# Patient Record
Sex: FEMALE | Race: WHITE | NOT HISPANIC OR LATINO | Employment: FULL TIME | URBAN - METROPOLITAN AREA
[De-identification: names, ages, dates, MRNs, and addresses within clinical notes are randomized per-mention and may not be internally consistent; named-entity substitution may affect disease eponyms.]

---

## 2018-04-18 ENCOUNTER — OFFICE VISIT CONVERTED (OUTPATIENT)
Dept: SURGERY | Facility: CLINIC | Age: 58
End: 2018-04-18
Attending: NURSE PRACTITIONER

## 2018-05-21 ENCOUNTER — CONVERSION ENCOUNTER (OUTPATIENT)
Dept: MAMMOGRAPHY | Facility: HOSPITAL | Age: 58
End: 2018-05-21

## 2019-05-23 ENCOUNTER — HOSPITAL ENCOUNTER (OUTPATIENT)
Dept: MAMMOGRAPHY | Facility: HOSPITAL | Age: 59
Discharge: HOME | End: 2019-05-23
Attending: NURSE PRACTITIONER

## 2020-05-20 ENCOUNTER — HOSPITAL ENCOUNTER (OUTPATIENT)
Dept: MRI IMAGING | Facility: HOSPITAL | Age: 60
Discharge: HOME | End: 2020-05-20
Attending: PHYSICIAN ASSISTANT

## 2021-05-16 VITALS — BODY MASS INDEX: 35.33 KG/M2 | HEIGHT: 62 IN | RESPIRATION RATE: 16 BRPM | WEIGHT: 192 LBS

## 2021-05-22 ENCOUNTER — TRANSCRIBE ORDERS (OUTPATIENT)
Dept: ADMINISTRATIVE | Facility: HOSPITAL | Age: 61
End: 2021-05-22

## 2021-05-22 DIAGNOSIS — Z12.31 VISIT FOR SCREENING MAMMOGRAM: Primary | ICD-10-CM

## 2021-06-16 ENCOUNTER — OFFICE VISIT (OUTPATIENT)
Dept: GASTROENTEROLOGY | Facility: CLINIC | Age: 61
End: 2021-06-16

## 2021-06-16 ENCOUNTER — PREP FOR SURGERY (OUTPATIENT)
Dept: OTHER | Facility: HOSPITAL | Age: 61
End: 2021-06-16

## 2021-06-16 VITALS
WEIGHT: 207 LBS | HEART RATE: 66 BPM | DIASTOLIC BLOOD PRESSURE: 104 MMHG | HEIGHT: 63 IN | SYSTOLIC BLOOD PRESSURE: 122 MMHG | BODY MASS INDEX: 36.68 KG/M2 | TEMPERATURE: 98.8 F

## 2021-06-16 DIAGNOSIS — K21.9 GASTROESOPHAGEAL REFLUX DISEASE, UNSPECIFIED WHETHER ESOPHAGITIS PRESENT: Primary | ICD-10-CM

## 2021-06-16 DIAGNOSIS — R10.32 LEFT LOWER QUADRANT ABDOMINAL PAIN: Primary | ICD-10-CM

## 2021-06-16 DIAGNOSIS — R12 HEARTBURN: ICD-10-CM

## 2021-06-16 PROBLEM — F39 MOOD DISORDER (HCC): Status: ACTIVE | Noted: 2021-06-16

## 2021-06-16 PROBLEM — M25.511 PAIN AND SWELLING OF RIGHT SHOULDER: Status: ACTIVE | Noted: 2020-05-08

## 2021-06-16 PROBLEM — R53.83 FATIGUE: Status: ACTIVE | Noted: 2020-06-15

## 2021-06-16 PROBLEM — E78.5 HYPERLIPIDEMIA: Status: ACTIVE | Noted: 2021-06-16

## 2021-06-16 PROBLEM — Z98.890 S/P ROTATOR CUFF REPAIR: Status: ACTIVE | Noted: 2020-09-11

## 2021-06-16 PROBLEM — M25.411 PAIN AND SWELLING OF RIGHT SHOULDER: Status: ACTIVE | Noted: 2020-05-08

## 2021-06-16 PROBLEM — M25.811 SHOULDER IMPINGEMENT, RIGHT: Status: ACTIVE | Noted: 2020-06-29

## 2021-06-16 PROBLEM — M75.41 SHOULDER IMPINGEMENT, RIGHT: Status: ACTIVE | Noted: 2020-06-29

## 2021-06-16 PROBLEM — I10 HIGH BLOOD PRESSURE: Status: ACTIVE | Noted: 2021-06-16

## 2021-06-16 PROBLEM — I10 HYPERTENSION: Status: ACTIVE | Noted: 2021-06-16

## 2021-06-16 PROBLEM — F32.A DEPRESSION: Status: ACTIVE | Noted: 2021-06-16

## 2021-06-16 PROBLEM — M75.111 NONTRAUMATIC INCOMPLETE TEAR OF RIGHT ROTATOR CUFF: Status: ACTIVE | Noted: 2020-06-29

## 2021-06-16 PROBLEM — M75.21 BICEPS TENDINITIS, RIGHT: Status: ACTIVE | Noted: 2020-06-29

## 2021-06-16 PROBLEM — G43.909 MIGRAINE: Status: ACTIVE | Noted: 2021-06-16

## 2021-06-16 PROBLEM — F41.9 ANXIETY: Status: ACTIVE | Noted: 2021-06-16

## 2021-06-16 PROCEDURE — 99214 OFFICE O/P EST MOD 30 MIN: CPT | Performed by: NURSE PRACTITIONER

## 2021-06-16 RX ORDER — LISINOPRIL AND HYDROCHLOROTHIAZIDE 20; 12.5 MG/1; MG/1
1 TABLET ORAL NIGHTLY
COMMUNITY
Start: 2021-04-17

## 2021-06-16 RX ORDER — ESCITALOPRAM OXALATE 20 MG/1
20 TABLET ORAL NIGHTLY
COMMUNITY
Start: 2021-04-17

## 2021-06-16 RX ORDER — METHOCARBAMOL 500 MG/1
TABLET, FILM COATED ORAL
COMMUNITY
Start: 2021-04-02 | End: 2021-08-04

## 2021-06-16 RX ORDER — DICLOFENAC SODIUM 75 MG/1
75 TABLET, DELAYED RELEASE ORAL 2 TIMES DAILY WITH MEALS
COMMUNITY
Start: 2021-04-16 | End: 2021-08-27

## 2021-06-16 RX ORDER — TAMSULOSIN HYDROCHLORIDE 0.4 MG/1
1 CAPSULE ORAL NIGHTLY
COMMUNITY
Start: 2021-04-17 | End: 2022-12-14 | Stop reason: SDUPTHER

## 2021-06-16 RX ORDER — MIRTAZAPINE 15 MG/1
15 TABLET, FILM COATED ORAL
COMMUNITY
Start: 2021-04-17

## 2021-06-16 RX ORDER — PANTOPRAZOLE SODIUM 40 MG/1
40 TABLET, DELAYED RELEASE ORAL DAILY
Qty: 90 TABLET | Refills: 3 | Status: SHIPPED | OUTPATIENT
Start: 2021-06-16 | End: 2021-06-16

## 2021-06-16 RX ORDER — ESOMEPRAZOLE MAGNESIUM 40 MG/1
CAPSULE, DELAYED RELEASE ORAL
COMMUNITY
End: 2021-06-16 | Stop reason: SDUPTHER

## 2021-06-16 RX ORDER — AMLODIPINE BESYLATE 2.5 MG/1
2.5 TABLET ORAL NIGHTLY
COMMUNITY

## 2021-06-16 RX ORDER — PANTOPRAZOLE SODIUM 40 MG/1
40 TABLET, DELAYED RELEASE ORAL DAILY
Qty: 90 TABLET | Refills: 3 | Status: SHIPPED | OUTPATIENT
Start: 2021-06-16 | End: 2021-08-31 | Stop reason: HOSPADM

## 2021-06-16 RX ORDER — OMEPRAZOLE 40 MG/1
40 CAPSULE, DELAYED RELEASE ORAL DAILY
COMMUNITY
End: 2021-06-16 | Stop reason: ALTCHOICE

## 2021-06-16 NOTE — PROGRESS NOTES
Patient Name: Margy Jimenez   Visit Date: 06/16/2021   Patient ID: [id number]  Provider: CECI Rubi    Sex: female  Location:  Location Address:  Location Phone: [address] [address] [phone]    YOB: 1960      Primary Care Provider Provider, No Known      Referring Provider: No ref. provider found        Chief Complaint  Heartburn    History of Present Illness   New pt states she's had HB x yrs, worse in the last 3 yrs. Dexilant worked great but no longer can afford. Taking Omeprazole at HS and Pepcid in afternoon, and still having HB. Has also tried Nexium and Prevacid and failed. Last EGD at least 10 yrs ago in [place].   No abd pain usually, Bm's normal, no blood in stool, or black stool. No unint wt loss, has trouble losing wt.   Pt saw PCP earlier today and noted she had LLQ tenderness, pt was not aware until pressure applied. Denies constipation. Hgb normal today 13.1, WBC 3.8     Last colonoscopy 6/20/2018 by Dr. Marsh: Adequate prep, mucosa segmental continuous melanosis in the ascending colon and cecum, otherwise negative.  No specimens were taken.    Has had COVID vaccine.       Past Medical History:   Diagnosis Date   • GERD (gastroesophageal reflux disease)    • High blood pressure    • Mood disorder (CMS/HCC)        Past Surgical History:   Procedure Laterality Date   • ABDOMINAL HYSTERECTOMY     • COLONOSCOPY           Current Outpatient Medications:   •  amLODIPine (NORVASC) 2.5 MG tablet, , Disp: , Rfl:   •  diclofenac (VOLTAREN) 75 MG EC tablet, Take 75 mg by mouth 2 (Two) Times a Day With Meals., Disp: , Rfl:   •  escitalopram (LEXAPRO) 20 MG tablet, Take 20 mg by mouth Daily., Disp: , Rfl:   •  lisinopril-hydrochlorothiazide (PRINZIDE,ZESTORETIC) 20-12.5 MG per tablet, Take 1 tablet by mouth Daily., Disp: , Rfl:   •  methocarbamol (ROBAXIN) 500 MG tablet, , Disp: , Rfl:   •  mirtazapine (REMERON) 15 MG tablet, Take 15 mg by mouth every  "night at bedtime., Disp: , Rfl:   •  tamsulosin (FLOMAX) 0.4 MG capsule 24 hr capsule, Take 1 capsule by mouth Daily., Disp: , Rfl:   •  pantoprazole (PROTONIX) 40 MG EC tablet, Take 1 tablet by mouth Daily., Disp: 90 tablet, Rfl: 3     Allergies   Allergen Reactions   • Hydromorphone Anaphylaxis   • Cephalexin Rash   • Hydromorphone Hcl Palpitations       Family History   Problem Relation Age of Onset   • Diabetes Mother    • Diabetes Other    • Colon cancer Neg Hx         Social History     Tobacco Use   • Smoking status: Never Smoker   • Smokeless tobacco: Never Used   Vaping Use   • Vaping Use: Never used   Substance Use Topics   • Alcohol use: Yes     Comment: current every day, drinks daily; wine   • Drug use: Not on file       Objective     Vital Signs:   BP (!) 122/104 (BP Location: Left arm, Patient Position: Sitting, Cuff Size: Large Adult)   Pulse 66   Temp 98.8 °F (37.1 °C) (Oral)   Ht 160 cm (63\")   Wt 93.9 kg (207 lb)   BMI 36.67 kg/m²       Physical Exam  Constitutional:       General: He is not in acute distress.     Appearance: Normal appearance.   HENT:      Head: Normocephalic and atraumatic.      Nose: Nose normal.   Pulmonary:      Effort: Pulmonary effort is normal. No respiratory distress.   Abdominal:      General: Abdomen is flat.      Palpations: Abdomen is soft. There is no mass. Pt is quite tender to LUQ/LLQ.     Tenderness: There is no guarding.   Musculoskeletal:      Cervical back: Neck supple.      Right lower leg: No edema.      Left lower leg: No edema.   Skin:     General: Skin is warm and dry.   Neurological:      General: No focal deficit present.      Mental Status: He is alert and oriented to person, place, and time.      Gait: Gait normal.   Psychiatric:         Mood and Affect: Mood normal.         Speech: Speech normal.         Behavior: Behavior normal.         Thought Content: Thought content normal.     Result Review :                 Assessment and Plan    Diagnoses " and all orders for this visit:    1. Left lower quadrant abdominal pain (Primary)  -     CT Abdomen Pelvis With Contrast; Future    2. Heartburn    Other orders  -     Discontinue: pantoprazole (PROTONIX) 40 MG EC tablet; Take 1 tablet by mouth Daily.  Dispense: 90 tablet; Refill: 3  -     pantoprazole (PROTONIX) 40 MG EC tablet; Take 1 tablet by mouth Daily.  Dispense: 90 tablet; Refill: 3          Follow Up   -Recommended small frequent meals, NPO 4 hrs before HS, elevated HOB by placing brick under bed frame by 4-6 inches.  -Changing med from Omeprazole to Protonix.   -EGD Surgical Risk and Benefits: Possible risks/complications, benefits, and alternatives to surgical or invasive procedure have been explained to patient and/or legal guardian; Patient has been evaluated and can tolerate anesthesia and/or sedation. Risks, benefits, and alternatives to anesthesia and sedation have been explained to patient and/or legal guardian.   -CT abd to ensure no diverticulitis  -Patient was given instructions and counseling regarding her condition or for health maintenance advice. Please see specific information pulled into the AVS if appropriate.

## 2021-06-29 ENCOUNTER — HOSPITAL ENCOUNTER (OUTPATIENT)
Dept: CT IMAGING | Facility: HOSPITAL | Age: 61
Discharge: HOME | End: 2021-06-29
Admitting: NURSE PRACTITIONER

## 2021-06-29 DIAGNOSIS — R10.32 LEFT LOWER QUADRANT ABDOMINAL PAIN: ICD-10-CM

## 2021-06-29 LAB — CREAT BLDA-MCNC: 1 MG/DL

## 2021-06-29 PROCEDURE — 82565 ASSAY OF CREATININE: CPT

## 2021-06-29 PROCEDURE — 74177 CT ABD & PELVIS W/CONTRAST: CPT | Performed by: RADIOLOGY

## 2021-06-29 PROCEDURE — 0 IOPAMIDOL PER 1 ML: Performed by: NURSE PRACTITIONER

## 2021-06-29 PROCEDURE — 74177 CT ABD & PELVIS W/CONTRAST: CPT

## 2021-06-29 RX ADMIN — IOPAMIDOL 100 ML: 755 INJECTION, SOLUTION INTRAVENOUS at 14:58

## 2021-07-01 ENCOUNTER — TELEPHONE (OUTPATIENT)
Dept: GASTROENTEROLOGY | Facility: CLINIC | Age: 61
End: 2021-07-01

## 2021-07-01 NOTE — TELEPHONE ENCOUNTER
----- Message from CECI Rubi sent at 6/29/2021  6:06 PM EDT -----  Please notify patient no diverticulitis seen or explanation for abdominal pain on the left side; however, patient does have fatty liver which we can further discuss at her next visit-- we do recommend a low-carb diet for this and weight loss, there was noted some thickening in the end of the colon on the right side and colonoscopy is recommended for this.  Please notify patient and arrange, patient is scheduled for an EGD, colon can be added to this.

## 2021-07-01 NOTE — TELEPHONE ENCOUNTER
I spoke with patient and she is fine with adding a Colonoscopy to her EGD 8.2.21. Please send prep to patient's pharmacy.The Colonoscopy has been added. I mailed out the information on a low carb diet.

## 2021-07-02 RX ORDER — POLYETHYLENE GLYCOL 3350, SODIUM SULFATE ANHYDROUS, SODIUM BICARBONATE, SODIUM CHLORIDE, POTASSIUM CHLORIDE 227.1; 21.5; 6.36; 5.53; .754 G/L; G/L; G/L; G/L; G/L
4 POWDER, FOR SOLUTION ORAL DAILY
Qty: 1 EACH | Refills: 0 | Status: SHIPPED | OUTPATIENT
Start: 2021-07-02 | End: 2021-07-03

## 2021-07-09 ENCOUNTER — TRANSCRIBE ORDERS (OUTPATIENT)
Dept: ADMINISTRATIVE | Facility: HOSPITAL | Age: 61
End: 2021-07-09

## 2021-07-09 DIAGNOSIS — Z12.31 SCREENING MAMMOGRAM, ENCOUNTER FOR: Primary | ICD-10-CM

## 2021-08-04 ENCOUNTER — HOSPITAL ENCOUNTER (EMERGENCY)
Facility: HOSPITAL | Age: 61
Discharge: HOME | End: 2021-08-04
Admitting: EMERGENCY MEDICINE

## 2021-08-04 ENCOUNTER — APPOINTMENT (OUTPATIENT)
Dept: GENERAL RADIOLOGY | Facility: HOSPITAL | Age: 61
End: 2021-08-04

## 2021-08-04 VITALS
DIASTOLIC BLOOD PRESSURE: 98 MMHG | BODY MASS INDEX: 37.62 KG/M2 | OXYGEN SATURATION: 97 % | TEMPERATURE: 98 F | HEART RATE: 75 BPM | WEIGHT: 212.3 LBS | SYSTOLIC BLOOD PRESSURE: 135 MMHG | RESPIRATION RATE: 20 BRPM | HEIGHT: 63 IN

## 2021-08-04 DIAGNOSIS — M54.41 ACUTE RIGHT-SIDED LOW BACK PAIN WITH RIGHT-SIDED SCIATICA: Primary | ICD-10-CM

## 2021-08-04 PROCEDURE — 25010000002 KETOROLAC TROMETHAMINE PER 15 MG: Performed by: NURSE PRACTITIONER

## 2021-08-04 PROCEDURE — 99282 EMERGENCY DEPT VISIT SF MDM: CPT

## 2021-08-04 PROCEDURE — 25010000002 DEXAMETHASONE PER 1 MG: Performed by: NURSE PRACTITIONER

## 2021-08-04 PROCEDURE — 96372 THER/PROPH/DIAG INJ SC/IM: CPT

## 2021-08-04 PROCEDURE — 72100 X-RAY EXAM L-S SPINE 2/3 VWS: CPT

## 2021-08-04 PROCEDURE — 25010000002 ORPHENADRINE CITRATE PER 60 MG: Performed by: NURSE PRACTITIONER

## 2021-08-04 RX ORDER — METHOCARBAMOL 750 MG/1
750 TABLET, FILM COATED ORAL 3 TIMES DAILY PRN
Qty: 20 TABLET | Refills: 0 | Status: SHIPPED | OUTPATIENT
Start: 2021-08-04 | End: 2021-11-18

## 2021-08-04 RX ORDER — DEXAMETHASONE SODIUM PHOSPHATE 10 MG/ML
10 INJECTION INTRAMUSCULAR; INTRAVENOUS ONCE
Status: COMPLETED | OUTPATIENT
Start: 2021-08-04 | End: 2021-08-04

## 2021-08-04 RX ORDER — METHOCARBAMOL 750 MG/1
750 TABLET, FILM COATED ORAL 3 TIMES DAILY PRN
Qty: 20 TABLET | Refills: 0 | Status: SHIPPED | OUTPATIENT
Start: 2021-08-04 | End: 2021-08-04 | Stop reason: SDUPTHER

## 2021-08-04 RX ORDER — ORPHENADRINE CITRATE 30 MG/ML
60 INJECTION INTRAMUSCULAR; INTRAVENOUS ONCE
Status: COMPLETED | OUTPATIENT
Start: 2021-08-04 | End: 2021-08-04

## 2021-08-04 RX ORDER — KETOROLAC TROMETHAMINE 30 MG/ML
60 INJECTION, SOLUTION INTRAMUSCULAR; INTRAVENOUS ONCE
Status: COMPLETED | OUTPATIENT
Start: 2021-08-04 | End: 2021-08-04

## 2021-08-04 RX ADMIN — KETOROLAC TROMETHAMINE 60 MG: 60 INJECTION, SOLUTION INTRAMUSCULAR at 11:15

## 2021-08-04 RX ADMIN — ORPHENADRINE CITRATE 60 MG: 60 INJECTION INTRAMUSCULAR; INTRAVENOUS at 11:13

## 2021-08-04 RX ADMIN — DEXAMETHASONE SODIUM PHOSPHATE 10 MG: 10 INJECTION INTRAMUSCULAR; INTRAVENOUS at 11:17

## 2021-08-06 NOTE — ED PROVIDER NOTES
Subjective     History provided by:  Patient   used: No    Back Pain  Location:  Lumbar spine  Quality:  Aching  Radiates to:  R posterior upper leg and R thigh  Pain severity:  Moderate  Pain is:  Worse during the day  Onset quality:  Sudden  Duration:  1 day  Timing:  Constant  Chronicity:  New  Context: not emotional stress, not jumping from heights, not lifting heavy objects, not MCA, not occupational injury, not pedestrian accident, not physical stress and not recent injury    Relieved by:  Nothing  Worsened by:  Nothing  Ineffective treatments:  OTC medications  Associated symptoms: no abdominal pain, no abdominal swelling, no chest pain, no fever, no headaches, no paresthesias, no tingling and no weight loss    Risk factors: not obese and not pregnant        Review of Systems   Constitutional: Negative for chills, fever and weight loss.   HENT: Negative for congestion, ear pain and sore throat.    Eyes: Negative for pain.   Respiratory: Negative for cough, chest tightness and shortness of breath.    Cardiovascular: Negative for chest pain.   Gastrointestinal: Negative for abdominal pain, diarrhea, nausea and vomiting.   Genitourinary: Negative for flank pain and hematuria.   Musculoskeletal: Positive for back pain. Negative for joint swelling.   Skin: Negative for pallor.   Neurological: Negative for tingling, seizures, headaches and paresthesias.   All other systems reviewed and are negative.      Past Medical History:   Diagnosis Date   • GERD (gastroesophageal reflux disease)    • High blood pressure    • Mood disorder (CMS/HCC)        Allergies   Allergen Reactions   • Hydromorphone Anaphylaxis   • Cephalexin Rash   • Hydromorphone Hcl Palpitations       Past Surgical History:   Procedure Laterality Date   • ABDOMINAL HYSTERECTOMY     • COLONOSCOPY         Family History   Problem Relation Age of Onset   • Diabetes Mother    • Diabetes Other    • Colon cancer Neg Hx        Social  History     Socioeconomic History   • Marital status:      Spouse name: Not on file   • Number of children: Not on file   • Years of education: Not on file   • Highest education level: Not on file   Tobacco Use   • Smoking status: Never Smoker   • Smokeless tobacco: Never Used   Vaping Use   • Vaping Use: Never used   Substance and Sexual Activity   • Alcohol use: Yes     Comment: current every day, drinks daily; wine           Objective   Physical Exam  Vitals and nursing note reviewed.   Constitutional:       General: She is not in acute distress.     Appearance: Normal appearance. She is not toxic-appearing.   HENT:      Head: Normocephalic and atraumatic.      Mouth/Throat:      Mouth: Mucous membranes are moist.   Eyes:      Extraocular Movements: Extraocular movements intact.      Pupils: Pupils are equal, round, and reactive to light.   Cardiovascular:      Rate and Rhythm: Normal rate and regular rhythm.      Pulses: Normal pulses.      Heart sounds: Normal heart sounds.   Pulmonary:      Effort: Pulmonary effort is normal. No respiratory distress.      Breath sounds: Normal breath sounds.   Abdominal:      General: Abdomen is flat.      Palpations: Abdomen is soft.      Tenderness: There is no abdominal tenderness.   Musculoskeletal:         General: Tenderness present. No swelling or signs of injury. Normal range of motion.      Cervical back: Normal range of motion and neck supple.      Comments: Lower back pain on right   Skin:     General: Skin is warm and dry.   Neurological:      Mental Status: She is alert and oriented to person, place, and time. Mental status is at baseline.         Procedures           ED Course                                           MDM  Number of Diagnoses or Management Options  Acute right-sided low back pain with right-sided sciatica: minor  Risk of Complications, Morbidity, and/or Mortality  Presenting problems: low  Diagnostic procedures: low  Management options:  low        Final diagnoses:   Acute right-sided low back pain with right-sided sciatica       ED Disposition  ED Disposition     ED Disposition Condition Comment    Discharge Stable           Mariangel Anne, APRN  [address]  [address]  [facility] [address]  [phone]               Medication List      Changed    methocarbamol 750 MG tablet  Commonly known as: ROBAXIN  Take 1 tablet by mouth 3 (Three) Times a Day As Needed for Muscle Spasms.  What changed:   · medication strength  · how much to take  · how to take this  · when to take this  · reasons to take this           Where to Get Your Medications      These medications were sent to [facility]/pharmacy #[id number] - [place], [place] - [address] - [phone]  - [phone] FX  [address] [place] [address]    Hours: 24-hours Phone: [phone]   · methocarbamol 750 MG tablet          Rhys Paredes APRN  08/06/21 [id number]

## 2021-08-19 ENCOUNTER — HOSPITAL ENCOUNTER (OUTPATIENT)
Dept: MAMMOGRAPHY | Facility: HOSPITAL | Age: 61
Discharge: HOME | End: 2021-08-19
Admitting: NURSE PRACTITIONER

## 2021-08-19 DIAGNOSIS — Z12.31 VISIT FOR SCREENING MAMMOGRAM: ICD-10-CM

## 2021-08-19 PROCEDURE — 77067 SCR MAMMO BI INCL CAD: CPT

## 2021-08-19 PROCEDURE — 77063 BREAST TOMOSYNTHESIS BI: CPT

## 2021-08-27 ENCOUNTER — TRANSCRIBE ORDERS (OUTPATIENT)
Dept: ADMINISTRATIVE | Facility: HOSPITAL | Age: 61
End: 2021-08-27

## 2021-08-27 DIAGNOSIS — R92.8 ABNORMAL MAMMOGRAM: Primary | ICD-10-CM

## 2021-08-31 ENCOUNTER — ANESTHESIA (OUTPATIENT)
Dept: GASTROENTEROLOGY | Facility: HOSPITAL | Age: 61
End: 2021-08-31

## 2021-08-31 ENCOUNTER — HOSPITAL ENCOUNTER (OUTPATIENT)
Facility: HOSPITAL | Age: 61
Setting detail: HOSPITAL OUTPATIENT SURGERY
Discharge: HOME | End: 2021-08-31
Attending: INTERNAL MEDICINE | Admitting: INTERNAL MEDICINE

## 2021-08-31 ENCOUNTER — ANESTHESIA EVENT (OUTPATIENT)
Dept: GASTROENTEROLOGY | Facility: HOSPITAL | Age: 61
End: 2021-08-31

## 2021-08-31 VITALS
SYSTOLIC BLOOD PRESSURE: 116 MMHG | OXYGEN SATURATION: 99 % | TEMPERATURE: 97.3 F | HEART RATE: 62 BPM | RESPIRATION RATE: 17 BRPM | WEIGHT: 210.32 LBS | BODY MASS INDEX: 37.26 KG/M2 | DIASTOLIC BLOOD PRESSURE: 58 MMHG

## 2021-08-31 DIAGNOSIS — K21.9 GASTROESOPHAGEAL REFLUX DISEASE, UNSPECIFIED WHETHER ESOPHAGITIS PRESENT: ICD-10-CM

## 2021-08-31 DIAGNOSIS — R12 HEARTBURN: ICD-10-CM

## 2021-08-31 PROCEDURE — 45390 COLONOSCOPY W/RESECTION: CPT | Performed by: INTERNAL MEDICINE

## 2021-08-31 PROCEDURE — C1889 IMPLANT/INSERT DEVICE, NOC: HCPCS | Performed by: INTERNAL MEDICINE

## 2021-08-31 PROCEDURE — 43239 EGD BIOPSY SINGLE/MULTIPLE: CPT | Performed by: INTERNAL MEDICINE

## 2021-08-31 PROCEDURE — 25010000002 PROPOFOL 10 MG/ML EMULSION: Performed by: NURSE ANESTHETIST, CERTIFIED REGISTERED

## 2021-08-31 PROCEDURE — 88305 TISSUE EXAM BY PATHOLOGIST: CPT | Performed by: INTERNAL MEDICINE

## 2021-08-31 DEVICE — CLIPPING DEVICE
Type: IMPLANTABLE DEVICE | Site: COLON | Status: FUNCTIONAL
Brand: RESOLUTION CLIP

## 2021-08-31 RX ORDER — ESOMEPRAZOLE MAGNESIUM 40 MG/1
40 CAPSULE, DELAYED RELEASE ORAL DAILY
COMMUNITY
End: 2021-10-07 | Stop reason: SDUPTHER

## 2021-08-31 RX ORDER — PROPOFOL 10 MG/ML
VIAL (ML) INTRAVENOUS AS NEEDED
Status: DISCONTINUED | OUTPATIENT
Start: 2021-08-31 | End: 2021-08-31 | Stop reason: SURG

## 2021-08-31 RX ORDER — LIDOCAINE HYDROCHLORIDE 20 MG/ML
INJECTION, SOLUTION INFILTRATION; PERINEURAL AS NEEDED
Status: DISCONTINUED | OUTPATIENT
Start: 2021-08-31 | End: 2021-08-31 | Stop reason: SURG

## 2021-08-31 RX ORDER — PANTOPRAZOLE SODIUM 40 MG/1
40 TABLET, DELAYED RELEASE ORAL DAILY
Qty: 30 TABLET | Refills: 5 | Status: SHIPPED | OUTPATIENT
Start: 2021-08-31 | End: 2022-04-20

## 2021-08-31 RX ORDER — SODIUM CHLORIDE, SODIUM LACTATE, POTASSIUM CHLORIDE, CALCIUM CHLORIDE 600; 310; 30; 20 MG/100ML; MG/100ML; MG/100ML; MG/100ML
30 INJECTION, SOLUTION INTRAVENOUS CONTINUOUS
Status: DISCONTINUED | OUTPATIENT
Start: 2021-08-31 | End: 2021-08-31 | Stop reason: HOSPADM

## 2021-08-31 RX ADMIN — LIDOCAINE HYDROCHLORIDE 100 MG: 20 INJECTION, SOLUTION INFILTRATION; PERINEURAL at 12:48

## 2021-08-31 RX ADMIN — SODIUM CHLORIDE, POTASSIUM CHLORIDE, SODIUM LACTATE AND CALCIUM CHLORIDE: 600; 310; 30; 20 INJECTION, SOLUTION INTRAVENOUS at 12:23

## 2021-08-31 RX ADMIN — PROPOFOL 200 MCG/KG/MIN: 10 INJECTION, EMULSION INTRAVENOUS at 12:26

## 2021-08-31 RX ADMIN — PROPOFOL 100 MG: 10 INJECTION, EMULSION INTRAVENOUS at 12:25

## 2021-08-31 RX ADMIN — PROPOFOL 50 MG: 10 INJECTION, EMULSION INTRAVENOUS at 12:27

## 2021-08-31 NOTE — ANESTHESIA POSTPROCEDURE EVALUATION
Patient: Margy Jimenez    Procedure Summary     Date: 08/31/21 Room / Location: [facility] ENDOSCOPY 4 / [facility] END[facility]COPY    Anesthesia Start: 1223 Anesthesia Stop: 1302    Procedures:       ESOPHAGOGASTRODUODENOSCOPY WITH BIOPSY (N/A )      COLONOSCOPY WITH INJECTION ORISE/POLYPECTOMY HOT SNARE/ENDO CLIP X3 (N/A ) Diagnosis:       Gastroesophageal reflux disease, unspecified whether esophagitis present      Heartburn      (Gastroesophageal reflux disease, unspecified whether esophagitis present [K21.9])      (Heartburn [R12])    Surgeons: David Hernandez MD Provider: China Raygoza DO    Anesthesia Type: general, MAC ASA Status: 2          Anesthesia Type: general, MAC    Vitals  Vitals Value Taken Time   /76 08/31/21 1312   Temp     Pulse 71 08/31/21 1315   Resp     SpO2 95 % 08/31/21 1315   Vitals shown include unvalidated device data.        Post Anesthesia Care and Evaluation    Patient location during evaluation: bedside  Patient participation: complete - patient participated  Level of consciousness: awake  Pain score: 0  Pain management: adequate  Airway patency: patent  Anesthetic complications: No anesthetic complications  PONV Status: none  Cardiovascular status: acceptable and stable  Respiratory status: acceptable and room air  Hydration status: acceptable    Comments: An Anesthesiologist personally participated in the most demanding procedures (including induction and emergence if applicable) in the anesthesia plan, monitored the course of anesthesia administration at frequent intervals and remained physically present and available for immediate diagnosis and treatment of emergencies.

## 2021-08-31 NOTE — ANESTHESIA PREPROCEDURE EVALUATION
Anesthesia Evaluation     Patient summary reviewed and Nursing notes reviewed   no history of anesthetic complications:  NPO Solid Status: > 8 hours  NPO Liquid Status: > 2 hours           Airway   Mallampati: III  TM distance: >3 FB  Neck ROM: full  Possible difficult intubation  Dental - normal exam     Pulmonary - normal exam   (-) COPD, asthma, sleep apnea, not a smoker  Cardiovascular - normal exam  Exercise tolerance: good (4-7 METS)    (+) hypertension, hyperlipidemia,       Neuro/Psych  (+) headaches, psychiatric history (mood disorder) Depression and Anxiety,     GI/Hepatic/Renal/Endo    (+)  GERD,      Musculoskeletal     (+) joint swelling,       ROS comment: Right rotator cuff injury  Abdominal   (+) obese,    Substance History - negative use     OB/GYN negative ob/gyn ROS         Other - negative ROS                     Anesthesia Plan    ASA 2     general and MAC   (Total IV Anesthesia    Patient understands anesthesia not responsible for dental damage.  )  intravenous induction     Anesthetic plan, all risks, benefits, and alternatives have been provided, discussed and informed consent has been obtained with: patient.

## 2021-09-01 LAB
CYTO UR: NORMAL
LAB AP CASE REPORT: NORMAL
LAB AP CLINICAL INFORMATION: NORMAL
PATH REPORT.FINAL DX SPEC: NORMAL
PATH REPORT.GROSS SPEC: NORMAL

## 2021-09-08 ENCOUNTER — APPOINTMENT (OUTPATIENT)
Dept: MAMMOGRAPHY | Facility: HOSPITAL | Age: 61
End: 2021-09-08

## 2021-09-22 ENCOUNTER — TRANSCRIBE ORDERS (OUTPATIENT)
Dept: ADMINISTRATIVE | Facility: HOSPITAL | Age: 61
End: 2021-09-22

## 2021-09-22 ENCOUNTER — HOSPITAL ENCOUNTER (OUTPATIENT)
Dept: ULTRASOUND IMAGING | Facility: HOSPITAL | Age: 61
Discharge: HOME | End: 2021-09-22

## 2021-09-22 ENCOUNTER — HOSPITAL ENCOUNTER (OUTPATIENT)
Dept: MAMMOGRAPHY | Facility: HOSPITAL | Age: 61
Discharge: HOME | End: 2021-09-22

## 2021-09-22 DIAGNOSIS — R92.8 ABNORMAL MAMMOGRAM: ICD-10-CM

## 2021-09-22 DIAGNOSIS — N63.20 LEFT BREAST MASS: Primary | ICD-10-CM

## 2021-09-22 PROCEDURE — 77066 DX MAMMO INCL CAD BI: CPT

## 2021-09-22 PROCEDURE — G0279 TOMOSYNTHESIS, MAMMO: HCPCS

## 2021-09-22 PROCEDURE — 76642 ULTRASOUND BREAST LIMITED: CPT

## 2021-09-29 ENCOUNTER — HOSPITAL ENCOUNTER (OUTPATIENT)
Dept: MAMMOGRAPHY | Facility: HOSPITAL | Age: 61
Discharge: HOME | End: 2021-09-29

## 2021-09-29 ENCOUNTER — DOCUMENTATION (OUTPATIENT)
Dept: MAMMOGRAPHY | Facility: HOSPITAL | Age: 61
End: 2021-09-29

## 2021-09-29 DIAGNOSIS — N63.20 LEFT BREAST MASS: ICD-10-CM

## 2021-09-29 PROCEDURE — 88360 TUMOR IMMUNOHISTOCHEM/MANUAL: CPT | Performed by: NURSE PRACTITIONER

## 2021-09-29 PROCEDURE — A4648 IMPLANTABLE TISSUE MARKER: HCPCS

## 2021-09-29 PROCEDURE — 88342 IMHCHEM/IMCYTCHM 1ST ANTB: CPT | Performed by: NURSE PRACTITIONER

## 2021-09-29 PROCEDURE — 88305 TISSUE EXAM BY PATHOLOGIST: CPT | Performed by: NURSE PRACTITIONER

## 2021-09-29 PROCEDURE — 25010000003 LIDOCAINE 1 % SOLUTION: Performed by: NURSE PRACTITIONER

## 2021-09-29 RX ORDER — LIDOCAINE HYDROCHLORIDE 10 MG/ML
10 INJECTION, SOLUTION INFILTRATION; PERINEURAL ONCE
Status: COMPLETED | OUTPATIENT
Start: 2021-09-29 | End: 2021-09-29

## 2021-09-29 RX ORDER — LIDOCAINE HYDROCHLORIDE AND EPINEPHRINE 10; 10 MG/ML; UG/ML
20 INJECTION, SOLUTION INFILTRATION; PERINEURAL ONCE
Status: COMPLETED | OUTPATIENT
Start: 2021-09-29 | End: 2021-09-29

## 2021-09-29 RX ADMIN — LIDOCAINE HYDROCHLORIDE AND EPINEPHRINE 20 ML: 10; 10 INJECTION, SOLUTION INFILTRATION; PERINEURAL at 10:22

## 2021-09-29 RX ADMIN — LIDOCAINE HYDROCHLORIDE 10 ML: 10 INJECTION, SOLUTION INFILTRATION; PERINEURAL at 10:22

## 2021-09-30 NOTE — PROGRESS NOTES
S/W PATIENT AFTER HER BIOPSY AND DISCUSSED DISCHARGE INSTRUCTIONS WITH [employer] FOR PAIN CONTROL AND PT V/U. PT REPORTED THAT SHE DOES HAVE HER MAMMOGRAM YEARLY, BUT MISSED LAST YEAR. THIS WAS CAUGHT ON SCREENING AND SHE DENIES HAVING ANY OTHER COMPLAINTS RELATED TO HER BREAST. PT REPORTED THAT SHE WAS 17 WITH HER FIRST CHILD AND 11 WITH FIRST PERIOD AND SHE HAD A PARTIAL HYSTERECTOMY AT AGE 30 WITH NO HORMONE REPLACEMENT THERAPY. PT DENIES ANY FAMILY HISTORY OF ANY CANCERS. I GAVE PT A CARD WITH MY CONTACT INFORMATION AND ENCOURAGED HER TO CALL WITH ANY QUESTIONS OR CONCERNS AND SHE V/U

## 2021-10-01 LAB
CYTO UR: NORMAL
LAB AP CASE REPORT: NORMAL
LAB AP CLINICAL INFORMATION: NORMAL
LAB AP SPECIAL STAINS: NORMAL
PATH REPORT.FINAL DX SPEC: NORMAL
PATH REPORT.GROSS SPEC: NORMAL

## 2021-10-04 ENCOUNTER — DOCUMENTATION (OUTPATIENT)
Dept: MAMMOGRAPHY | Facility: HOSPITAL | Age: 61
End: 2021-10-04

## 2021-10-04 NOTE — PROGRESS NOTES
S/W MENDEZ AT [facility]'S REFERRAL DEPT AND HE STATED THAT PT WILL BE STAYING LOCAL HERE AT [facility] [place]. HE STATED THAT HE WILL FAX THE REFERRAL TO [facility]. I CALLED AND GOT AN APPT WITH DR. ALVARADO ON 10/11/21 AT 11:00 AND DR. RUIZ ON 10/7/21 AT 3:00. I CALLED PATIENT AND AT FIRST PT SAID SHE DECIDED TO STAY AT [facility] IN [place] AND I WAS HAPPY TO FACILITATE THAT IN ANY WAY I COULD, BUT WE GOT TO TALKING AND I EXPLAINED THAT WE ARE A CERTIFIED CANCER CENTER AND I TOLD HER ABOUT THE [facility] GUIDELINES AND SHE DECIDED TO STAY HERE WITH DANYEL AZUL. I CALLED MENDEZ BACK AND HE STILL WAS SENDING REFERRAL TO [facility] [place] AND NOT [facility]. I GAVE PATIENT HER DATES, TIMES AND PLACES OF HER APPTS. AND SHE V/U

## 2021-10-05 PROBLEM — C50.912 MALIGNANT NEOPLASM OF LEFT BREAST IN FEMALE, ESTROGEN RECEPTOR POSITIVE: Status: ACTIVE | Noted: 2021-10-05

## 2021-10-05 PROBLEM — Z17.0 MALIGNANT NEOPLASM OF LEFT BREAST IN FEMALE, ESTROGEN RECEPTOR POSITIVE: Status: ACTIVE | Noted: 2021-10-05

## 2021-10-05 NOTE — PROGRESS NOTES
Chief Complaint: Breast Cancer    Subjective         History of Present Illness  Margy Jimenez is a 61 y.o. female presents to [facility] GENERAL SURGERY to be seen for a 1 cm left breast mass at 1200 about 1 cm FTN that was biopsied and returned as:      Clinical Information    Left breast mass   Comment:         Final Diagnosis   Left breast,  12 o'clock position, 1 cm from nipple, ultrasound-guided core biopsy:  -  Invasive ductal carcinoma with tubular features  - Histologic grade (Myles Histologic Score):    - Glandular (Acinar)/Tubular Differentiation:  Score 1  - Nuclear Pleomorphism:  Score 1  - Mitotic Rate:  Score 1  - Overall Grade:  Grade 1               - Size of largest focus of invasion: 7 mm               - Breast biomarker testing:                            - Estrogen Receptor (ER): Positive (100%, strong)                            - Progesterone Receptor (PgR): Positive (90%, strong)                            - HER2 (by immunohistochemistry):  Negative (Score 1+)  - Ki-67: 5%               - Other findings:                            - Atypical ductal hyperplasia (ADH)       PROCEDURE:  MAMMO DIAGNOSTIC DIGITAL TOMOSYNTHESIS BILATERAL W CAD, 9/22/2021, 13:58  US BREAST LEFT LIMITED, 9/22/2021, 14:22     COMPARISON:  [facility], , DIG SCREENING BILAT HEATHER W 3D MANOHAR, 5/23/2019, 7:27.    [facility], MAMMO SCREENING DIGITAL TOMOSYNTHESIS BILATERAL W CAD,   8/19/2021, 8:33.  INDICATIONS:  BILATERAL BREAST ASYMMETRIES     FINDINGS:          The asymmetry noted within the right breast on screening mammogram does not persist on diagnostic   imaging.  No suspicious masses, areas of architectural distortion, or suspicious calcifications are   seen within the right breast.     There is a persistent ovoid density at the anterior aspect of the left breast.  This finding is   seen in the retroareolar region slightly towards the 12-2  o'clock position and is seen   approximately 2 cm from the nipple.  There is suggestion of slightly angulated or spiculated   margins.  This finding measures approximately 0.7 cm.  No suspicious calcifications are observed.     Diagnostic ultrasound of the left breast demonstrates evidence for an ill-defined hypoechoic focus   at approximately the 12 o'clock position in the subareolar region of the left breast.  This finding   measures 0.8 x 1.0 x 0.6 cm and correlates to the density on the mammogram.  The finding   demonstrates heterogeneous echogenicity.  There are also ill-defined irregular margins.  The   finding is taller than it is wide as well.  This is a suspicious abnormality.      IMPRESSION:  1. Negative diagnostic ultrasound of the right breast.  2. Heterogeneous ill-defined hypoechoic focus noted at the 12 o'clock position of the subareolar   region of the left breast on diagnostic ultrasound.  This finding correlates to the density on the   mammogram in this region.  This is a suspicious abnormality.  Ultrasound-directed core biopsy is   recommended for further pathologic characterization.     RECOMMENDATION(S):               ULTRASOUND-GUIDED CORE BIOPSY: LEFT BREAST        Objective     Past Medical History:   Diagnosis Date   • GERD (gastroesophageal reflux disease)    • High blood pressure    • Mood disorder (HCC)        Past Surgical History:   Procedure Laterality Date   • ABDOMINAL HYSTERECTOMY     • BUNIONECTOMY     • COLONOSCOPY      aprrox 2018    • COLONOSCOPY N/A 8/31/2021    Procedure: COLONOSCOPY WITH INJECTION ORISE/POLYPECTOMY HOT SNARE/ENDO CLIP X3;  Surgeon: David Hernandez MD;  Location: [facility] ENDOSCOPY;  Service: Gastroenterology;  Laterality: N/A;  COLON POLYP   • CYSTOSCOPY     • ENDOSCOPY     • ENDOSCOPY N/A 8/31/2021    Procedure: ESOPHAGOGASTRODUODENOSCOPY WITH BIOPSY;  Surgeon: David Hernandez MD;  Location: [facility] ENDOSCOPY;  Service: Gastroenterology;  Laterality:  "N/A;  GASTRIC POLYPS/REFLUX ESOPHAGITIS   • LAPAROSCOPIC CHOLECYSTECTOMY     • ROTATOR CUFF REPAIR Right          Current Outpatient Medications:   •  amLODIPine (NORVASC) 2.5 MG tablet, Take 2.5 mg by mouth Every Night., Disp: , Rfl:   •  atorvastatin (LIPITOR) 10 MG tablet, Take 10 mg by mouth every night at bedtime., Disp: , Rfl:   •  escitalopram (LEXAPRO) 20 MG tablet, Take 20 mg by mouth Every Night., Disp: , Rfl:   •  lisinopril-hydrochlorothiazide (PRINZIDE,ZESTORETIC) 20-12.5 MG per tablet, Take 1 tablet by mouth Every Night., Disp: , Rfl:   •  methocarbamol (ROBAXIN) 750 MG tablet, Take 1 tablet by mouth 3 (Three) Times a Day As Needed for Muscle Spasms. (Patient taking differently: Take 750 mg by mouth Daily As Needed for Muscle Spasms.), Disp: 20 tablet, Rfl: 0  •  mirtazapine (REMERON) 15 MG tablet, Take 15 mg by mouth every night at bedtime., Disp: , Rfl:   •  pantoprazole (PROTONIX) 40 MG EC tablet, Take 1 tablet by mouth Daily., Disp: 30 tablet, Rfl: 5  •  tamsulosin (FLOMAX) 0.4 MG capsule 24 hr capsule, Take 1 capsule by mouth Every Night., Disp: , Rfl:     Allergies   Allergen Reactions   • Hydromorphone Anaphylaxis   • Cephalexin Rash   • Hydromorphone Hcl Palpitations        Family History   Problem Relation Age of Onset   • Diabetes Mother    • Diabetes Other    • Colon cancer Neg Hx        Social History     Socioeconomic History   • Marital status:    Tobacco Use   • Smoking status: Never Smoker   • Smokeless tobacco: Never Used   Vaping Use   • Vaping Use: Never used   Substance and Sexual Activity   • Alcohol use: Yes     Alcohol/week: 1.0 standard drink     Types: 1 Glasses of wine per week   • Drug use: Never   • Sexual activity: Defer       Vital Signs:   Resp 14   Ht 160 cm (63\")   Wt 95.8 kg (211 lb 3.2 oz)   BMI 37.41 kg/m²    Review of Systems   Constitutional: Negative for fatigue and fever.   HENT: Negative for ear pain and sore throat.    Eyes: Negative for blurred " vision.   Respiratory: Negative for cough and shortness of breath.    Cardiovascular: Negative for chest pain and leg swelling.   Gastrointestinal: Negative for abdominal pain, constipation, diarrhea, nausea and vomiting.   Musculoskeletal: Negative for arthralgias and myalgias.   Skin: Negative for rash and skin lesions.   Neurological: Negative for weakness and headache.   Hematological: Negative for adenopathy. Does not bruise/bleed easily.   Psychiatric/Behavioral: Negative for sleep disturbance and depressed mood.       Physical Exam  Vitals and nursing note reviewed.   Constitutional:       Appearance: Normal appearance.   HENT:      Head: Normocephalic and atraumatic.   Eyes:      Extraocular Movements: Extraocular movements intact.      Pupils: Pupils are equal, round, and reactive to light.   Cardiovascular:      Pulses: Normal pulses.   Pulmonary:      Effort: Pulmonary effort is normal. No accessory muscle usage or respiratory distress.   Chest:   Breasts:      Right: Normal. No inverted nipple, nipple discharge, skin change, axillary adenopathy or supraclavicular adenopathy.      Left: Normal. No inverted nipple, nipple discharge, skin change, axillary adenopathy or supraclavicular adenopathy.        Comments: Well healed breast biopsy site in left breast  Abdominal:      General: Abdomen is flat.      Palpations: Abdomen is soft.      Tenderness: There is no abdominal tenderness. There is no guarding.   Musculoskeletal:         General: No swelling, tenderness or deformity.      Cervical back: Neck supple.   Lymphadenopathy:      Upper Body:      Right upper body: No supraclavicular or axillary adenopathy.      Left upper body: No supraclavicular or axillary adenopathy.   Skin:     General: Skin is warm and dry.   Neurological:      General: No focal deficit present.      Mental Status: She is alert and oriented to person, place, and time.   Psychiatric:         Mood and Affect: Mood normal.          Thought Content: Thought content normal.          Result Review :          Assessment and Plan    Diagnoses and all orders for this visit:    1. Malignant neoplasm of left breast in female, estrogen receptor positive, unspecified site of breast (HCC) (Primary)  -     NM [place] Node Injection Only; Future  -     Case Request; Standing  -     Mammo Breast Placement Device Initial Without Biopsy Left; Future  -     Case Request      Left needle localized lumpectomy with SLN bx  Follow Up   Return for Next scheduled follow up after surgery.  Patient was given instructions and counseling regarding her condition or for health maintenance advice. Please see specific information pulled into the AVS if appropriate.         This document has been electronically signed by Ayah Chaparro MD  October 11, 2021 11:27 EDT

## 2021-10-07 ENCOUNTER — CONSULT (OUTPATIENT)
Dept: ONCOLOGY | Facility: HOSPITAL | Age: 61
End: 2021-10-07

## 2021-10-07 VITALS
RESPIRATION RATE: 16 BRPM | BODY MASS INDEX: 39.75 KG/M2 | HEIGHT: 61 IN | DIASTOLIC BLOOD PRESSURE: 96 MMHG | OXYGEN SATURATION: 100 % | TEMPERATURE: 97.8 F | HEART RATE: 77 BPM | SYSTOLIC BLOOD PRESSURE: 144 MMHG | WEIGHT: 210.54 LBS

## 2021-10-07 DIAGNOSIS — Z91.89 AT RISK FOR LYMPHEDEMA: ICD-10-CM

## 2021-10-07 DIAGNOSIS — C50.912 MALIGNANT NEOPLASM OF LEFT BREAST IN FEMALE, ESTROGEN RECEPTOR POSITIVE, UNSPECIFIED SITE OF BREAST (HCC): Primary | ICD-10-CM

## 2021-10-07 DIAGNOSIS — Z01.818 ENCOUNTER FOR PREOPERATIVE ASSESSMENT: ICD-10-CM

## 2021-10-07 DIAGNOSIS — Z17.0 MALIGNANT NEOPLASM OF LEFT BREAST IN FEMALE, ESTROGEN RECEPTOR POSITIVE, UNSPECIFIED SITE OF BREAST (HCC): Primary | ICD-10-CM

## 2021-10-07 PROCEDURE — 99205 OFFICE O/P NEW HI 60 MIN: CPT | Performed by: INTERNAL MEDICINE

## 2021-10-07 PROCEDURE — G0463 HOSPITAL OUTPT CLINIC VISIT: HCPCS | Performed by: INTERNAL MEDICINE

## 2021-10-07 RX ORDER — ATORVASTATIN CALCIUM 10 MG/1
TABLET, FILM COATED ORAL
COMMUNITY
Start: 2020-09-07 | End: 2021-10-07 | Stop reason: SDUPTHER

## 2021-10-07 RX ORDER — ATORVASTATIN CALCIUM 10 MG/1
10 TABLET, FILM COATED ORAL
COMMUNITY
Start: 2021-09-11

## 2021-10-07 NOTE — PROGRESS NOTES
Patient   Margy OTTO [facility] HEMATOLOGY & ONCOLOGY    Chief Complaint  Breast Cancer (-NEW)    Referring Provider: Provider, No Known  PCP: Mariangel Anne APRN    Subjective          Oncology/Hematology History Overview Note     ER+ Left Breast Cancer:    Diagnostic mammogram/US 9/22/21: Heterogeneous ill-defined hypoechoic focus at the 12 o'clock position of the subareolar region of the left breast    US guided biopsy: Invasive ductal carcinoma with tubular features, grade 1, largest focus 7 mm, ER positive (100%, strong), AK positive (90%, strong), HER-2 negative (score 1+), Ki-67 5%, associated with ADH     Malignant neoplasm of left breast in female, estrogen receptor positive (HCC)   10/5/2021 Initial Diagnosis    Malignant neoplasm of left breast in female, estrogen receptor positive (HCC)         History of Present Illness  Patient comes in today to discuss the plan of care for her newly diagnosed left breast cancer.  She has not yet seen the surgeon but has an appointment with her this coming Monday.  We discussed the projected treatment plan.  The patient will have surgery first.  She does not have any significant family history of cancers so she will not need genetic testing.  We did discuss briefly the risks and benefits to adjuvant endocrine therapy with an aromatase inhibitor.  I explained the rationale behind not getting further staging scans and the patient was understanding.      Review of Systems   Constitutional: Positive for fatigue (5/10). Negative for appetite change, diaphoresis, fever, unexpected weight gain and unexpected weight loss.   HENT: Negative for hearing loss, mouth sores, sore throat, swollen glands, trouble swallowing and voice change.    Eyes: Negative for blurred vision.   Respiratory: Negative for cough, shortness of breath and wheezing.    Cardiovascular: Negative for chest pain and palpitations.   Gastrointestinal: Negative for  abdominal pain, blood in stool, constipation, diarrhea, nausea and vomiting.   Endocrine: Negative for cold intolerance and heat intolerance.   Genitourinary: Negative for difficulty urinating, dysuria, frequency, hematuria and urinary incontinence.   Musculoskeletal: Negative for arthralgias, back pain and myalgias.   Skin: Negative for rash, skin lesions and bruise.   Neurological: Negative for dizziness, seizures, weakness, numbness and headache.   Hematological: Does not bruise/bleed easily.   Psychiatric/Behavioral: Negative for depressed mood. The patient is not nervous/anxious.    All other systems reviewed and are negative.      Past Medical History:   Diagnosis Date   • GERD (gastroesophageal reflux disease)    • High blood pressure    • Mood disorder (HCC)      Past Surgical History:   Procedure Laterality Date   • ABDOMINAL HYSTERECTOMY     • BUNIONECTOMY     • COLONOSCOPY      aprrox 2018    • COLONOSCOPY N/A 8/31/2021    Procedure: COLONOSCOPY WITH INJECTION ORISE/POLYPECTOMY HOT SNARE/ENDO CLIP X3;  Surgeon: David Hernandez MD;  Location: [facility] ENDOSCOPY;  Service: Gastroenterology;  Laterality: N/A;  COLON POLYP   • CYSTOSCOPY     • ENDOSCOPY     • ENDOSCOPY N/A 8/31/2021    Procedure: ESOPHAGOGASTRODUODENOSCOPY WITH BIOPSY;  Surgeon: David Hernandez MD;  Location: [facility] ENDOSCOPY;  Service: Gastroenterology;  Laterality: N/A;  GASTRIC POLYPS/REFLUX ESOPHAGITIS   • LAPAROSCOPIC CHOLECYSTECTOMY     • ROTATOR CUFF REPAIR Right      Social History     Socioeconomic History   • Marital status:      Spouse name: Not on file   • Number of children: Not on file   • Years of education: Not on file   • Highest education level: Not on file   Tobacco Use   • Smoking status: Never Smoker   • Smokeless tobacco: Never Used   Vaping Use   • Vaping Use: Never used   Substance and Sexual Activity   • Alcohol use: Yes     Alcohol/week: 1.0 standard drinks     Types: 1 Glasses of wine per week   •  "Drug use: Never   • Sexual activity: Defer     Family History   Problem Relation Age of Onset   • Diabetes Mother    • Diabetes Other    • Colon cancer Neg Hx        Objective   Physical Exam  General: Alert, cooperative, no acute distress  Eyes: Anicteric sclera, PERRLA  Breast: Left breast with significant bruising, no axillary adenopathy  Respiratory: normal respiratory effort  Cardiovascular: no lower extremity edema  Skin: Normal tone, no rash, no lesions  Psychiatric: Appropriate affect, intact judgment  Neurologic: No focal sensory or motor deficits, normal cognition   Musculoskeletal: Normal muscle strength and tone  Extremities: No clubbing, cyanosis, or deformities    Vitals:    10/07/21 1457   BP: 144/96   Pulse: 77   Resp: 16   Temp: 97.8 °F (36.6 °C)   SpO2: 100%   Weight: 95.5 kg (210 lb 8.6 oz)   Height: 155 cm (61.02\")   PainSc: 0-No pain     ECOG score: 0         PHQ-9 Total Score: 0         Result Review :   The following data was reviewed by: Mariela Watson MD PhD on 10/07/2021:  Lab Results   Component Value Date    HGB 13.1 06/16/2021    HCT 40.2 06/16/2021    MCV 91.0 06/16/2021     06/16/2021    WBC 3.80 (L) 06/16/2021    NEUTROABS 2.00 06/16/2021    LYMPHSABS 1.28 06/16/2021    MONOSABS 0.31 06/16/2021    EOSABS 0.15 06/16/2021    BASOSABS 0.02 06/16/2021     Lab Results   Component Value Date    BUN 15 06/17/2020    CREATININE 1.00 06/29/2021     06/17/2020    K 4.0 06/17/2020     06/17/2020    CO2 26 06/17/2020    CALCIUM 9.5 06/17/2020    PROTEINTOT 7.8 06/17/2020    ALBUMIN 4.4 06/17/2020    BILITOT 0.3 06/17/2020    ALKPHOS 103 06/17/2020    AST 38 06/17/2020    ALT 30 06/17/2020          Assessment and Plan    Diagnoses and all orders for this visit:    1. Malignant neoplasm of left breast in female, estrogen receptor positive, unspecified site of breast (HCC) (Primary)  -     Ambulatory Referral to Lymphedema Clinic    2. At risk for lymphedema  -     Ambulatory " Referral to Lymphedema Clinic    3. Encounter for preoperative assessment  -     Ambulatory Referral to Lymphedema Clinic    I reviewed the patient's mammogram images and discussed her case in our breast cancer tumor board with our surgeon and other providers.  She will see Dr. Chaparro on Monday and plan for surgery first.  The patient is interested in lumpectomy if possible.  She will follow up with me in 4 weeks to discuss the results of Oncotype testing.  It is likely she will not need chemotherapy.  However, we can discuss this again at the appointment.  We also briefly discussed adjuvant endocrine therapy and radiation which I will address again at the next appointment.    Patient was given instructions and counseling regarding her condition or for health maintenance advice. Please see specific information pulled into the AVS if appropriate.     Mariela Watson MD PhD    10/7/2021

## 2021-10-11 ENCOUNTER — OFFICE VISIT (OUTPATIENT)
Dept: SURGERY | Facility: CLINIC | Age: 61
End: 2021-10-11

## 2021-10-11 VITALS — HEIGHT: 63 IN | BODY MASS INDEX: 37.42 KG/M2 | WEIGHT: 211.2 LBS | RESPIRATION RATE: 14 BRPM

## 2021-10-11 DIAGNOSIS — Z17.0 MALIGNANT NEOPLASM OF LEFT BREAST IN FEMALE, ESTROGEN RECEPTOR POSITIVE, UNSPECIFIED SITE OF BREAST (HCC): Primary | ICD-10-CM

## 2021-10-11 DIAGNOSIS — C50.912 MALIGNANT NEOPLASM OF LEFT BREAST IN FEMALE, ESTROGEN RECEPTOR POSITIVE, UNSPECIFIED SITE OF BREAST (HCC): Primary | ICD-10-CM

## 2021-10-11 PROCEDURE — 99204 OFFICE O/P NEW MOD 45 MIN: CPT | Performed by: SURGERY

## 2021-10-18 DIAGNOSIS — Z17.0 MALIGNANT NEOPLASM OF LEFT BREAST IN FEMALE, ESTROGEN RECEPTOR POSITIVE, UNSPECIFIED SITE OF BREAST (HCC): Primary | ICD-10-CM

## 2021-10-18 DIAGNOSIS — C50.912 MALIGNANT NEOPLASM OF LEFT BREAST IN FEMALE, ESTROGEN RECEPTOR POSITIVE, UNSPECIFIED SITE OF BREAST (HCC): Primary | ICD-10-CM

## 2021-10-18 DIAGNOSIS — Z01.818 ENCOUNTER FOR PREOPERATIVE ASSESSMENT: ICD-10-CM

## 2021-10-21 ENCOUNTER — HOSPITAL ENCOUNTER (OUTPATIENT)
Dept: OCCUPATIONAL THERAPY | Facility: HOSPITAL | Age: 61
Setting detail: THERAPIES SERIES
Discharge: HOME | End: 2021-10-21

## 2021-10-21 DIAGNOSIS — Z17.0 MALIGNANT NEOPLASM OF LEFT BREAST IN FEMALE, ESTROGEN RECEPTOR POSITIVE, UNSPECIFIED SITE OF BREAST (HCC): Primary | ICD-10-CM

## 2021-10-21 DIAGNOSIS — Z91.89 AT RISK FOR LYMPHEDEMA: ICD-10-CM

## 2021-10-21 DIAGNOSIS — C50.912 MALIGNANT NEOPLASM OF LEFT BREAST IN FEMALE, ESTROGEN RECEPTOR POSITIVE, UNSPECIFIED SITE OF BREAST (HCC): Primary | ICD-10-CM

## 2021-10-21 DIAGNOSIS — Z01.818 ENCOUNTER FOR PREOPERATIVE ASSESSMENT: ICD-10-CM

## 2021-10-21 PROCEDURE — 97535 SELF CARE MNGMENT TRAINING: CPT

## 2021-10-21 PROCEDURE — 97165 OT EVAL LOW COMPLEX 30 MIN: CPT

## 2021-10-21 NOTE — THERAPY EVALUATION
Outpatient Occupational Therapy Lymphedema Initial Evaluation  [facility]     Patient Name: Margy Jimenez  : 1960  MRN: [id number]  Today's Date: 10/21/2021      Visit Date: 10/21/2021    Patient Active Problem List   Diagnosis   • High blood pressure   • Mood disorder (HCC)   • Anxiety   • Biceps tendinitis, right   • Fatigue   • GERD (gastroesophageal reflux disease)   • Hyperlipidemia   • Migraine   • Nontraumatic incomplete tear of right rotator cuff   • Pain and swelling of right shoulder   • S/P rotator cuff repair   • Shoulder impingement, right   • Hypertension   • Depression   • Gastroesophageal reflux disease   • Heartburn   • Malignant neoplasm of left breast in female, estrogen receptor positive (HCC)        Past Medical History:   Diagnosis Date   • GERD (gastroesophageal reflux disease)    • High blood pressure    • Hyperlipidemia    • Mood disorder (HCC)         Past Surgical History:   Procedure Laterality Date   • ABDOMINAL HYSTERECTOMY     • BUNIONECTOMY     • COLONOSCOPY      2018    • COLONOSCOPY N/A 2021    Procedure: COLONOSCOPY WITH INJECTION ORISE/POLYPECTOMY HOT SNARE/ENDO CLIP X3;  Surgeon: David Hernandez MD;  Location: [facility] ENDOSCOPY;  Service: Gastroenterology;  Laterality: N/A;  COLON POLYP   • CYSTOSCOPY     • ENDOSCOPY     • ENDOSCOPY N/A 2021    Procedure: ESOPHAGOGASTRODUODENOSCOPY WITH BIOPSY;  Surgeon: David Hernandez MD;  Location: [facility] ENDOSCOPY;  Service: Gastroenterology;  Laterality: N/A;  GASTRIC POLYPS/REFLUX ESOPHAGITIS   • LAPAROSCOPIC CHOLECYSTECTOMY     • ROTATOR CUFF REPAIR Right          Visit Dx:     ICD-10-CM ICD-9-CM   1. Malignant neoplasm of left breast in female, estrogen receptor positive, unspecified site of breast (HCC)  C50.912 174.9    Z17.0 V86.0   2. Encounter for preoperative assessment  Z01.818 V72.84   3. At risk for lymphedema  Z91.89 V49.89        Patient History     Row Name 10/21/21 0900              History    Chief Complaint Other 1 (comment)  At risk for lymphedema, presurgical education  -CH      Brief Description of Current Complaint Pt. is a pleasant 62 y/o female with a recent dx of IDC er/pr+ HER 2- who is scheduled for a lumpectomy w/SNLB  on 10/26/2021  -CH      Hand Dominance right-handed  -CH              Fall Risk Assessment    Any falls in the past year: No  -CH      Does patient have a fear of falling No  -CH              Services    Prior Rehab/Home Health Experiences No  -CH      Are you currently receiving Home Health services No  -CH              Daily Activities    Primary Language English  -CH      Are you able to read Yes  -CH      Are you able to write Yes  -CH      How does patient learn best? Demonstration  -CH      Barriers to learning None  -CH              Safety    Are you being hurt, hit, or frightened by anyone at home or in your life? No  -CH      Are you being neglected by a caregiver No  -CH      Have you had any of the following issues with Depression  under control  -CH            User Key  (r) = Recorded By, (t) = Taken By, (c) = Cosigned By    Initials Name Provider Type    Lenore Mendieta OT Occupational Therapist                 Lymphedema     Row Name 10/21/21 1300             Subjective Pain    Able to rate subjective pain? yes  -CH      Pre-Treatment Pain Level 0  -CH      Post-Treatment Pain Level 0  -CH              Subjective Comments    Subjective Comments Pt. states she did have some soreness in her biopsy site that has gotten much better.  -CH              Lymphedema Assessment    Lymphedema Classification LUE:; at risk/stage 0  -CH              Physical Concerns    The amount of pain associated with my lymphedema is: 0  -CH      The amount of limb heaviness associated with my lymphedema is: 0  -CH      The amount of skin tightness associated with my lymphedema is: 0  -CH      The size of my swollen limb(s) seems: 0  -CH      Lymphedema affects the movement of  "my swollen limb(s): 0  -CH      The strength in my swollen limb(s) is: 0  -CH              Psychosocial Concerns    Lymphedema affects my body image (i.e., \"how I think I look\"). 0  -CH      Lymphedema affects my socializing with others. 0  -CH      Lymphedema affects my intimate relations with spouse or partner (rate 0 if not applicable 0  -CH      Lymphedema \"gets me down\" (i.e., depression, frustration, or anger) 0  -CH      I must rely on others for help due to my lymphedema. 0  -CH      I know what to do to manage my lymphedema 4  -CH              Functional Concerns    Lymphedema affects my ability to perform self-care activities (i.e. eating, dressing, hygiene) 0  -CH      Lymphedema affects my ability to perform routine home or work-related activities. 0  -CH      Lymphedema affects my performance of preferred leisure activities. 0  -CH      Lymphedema affects proper fit of clothing/shoes 0  -CH      Lymphedema affects my sleep 0  -CH              Posture/Observations    Posture- WNL Posture is WNL  -CH              General ROM    GENERAL ROM COMMENTS B UE WFL  -CH              Skin Changes/Observations    Location/Assessment Upper Quadrant  -CH      Upper Quadrant Conditions left:; normal  -CH      Upper Quadrant Color/Pigment --  echimosis present with palpable hematoma  -CH      Skin Observations Comment Per patient report, her hematoma has improved significantly since her biopsy but she does still have a small palpable mass in this area that OT believes is residual hematoma.  -CH              Lymphedema Measurements    Measurement Type(s) Circumferential  -CH              Lymphedema Life Impact Scale Totals    A.  Total Q1 - Q17 (Do not include Q18) 4  -CH      B.  Total number of questions answered (Q1-Q17) 17  -CH      C. Divide A by B 0.24  -CH      D. Multiple C by 25 6  -CH            User Key  (r) = Recorded By, (t) = Taken By, (c) = Cosigned By    Initials Name Provider Type    Lenore Mendieta, " OT Occupational Therapist                         OT Ortho     Row Name 10/21/21 1300             Orthotics & Prosthetics Management    Orthosis Location --  trunk compression garment  -      Additional Documentation Orthosis Location (Row)  -            User Key  (r) = Recorded By, (t) = Taken By, (c) = Cosigned By    Initials Name Provider Type    Lenore Mendieta OT Occupational Therapist              Prefitting for post surgical garment: size 40/42 [facility] Education  Education Details: Education on lymphatic physiology and development of lymphedema, education for post surgical care, stretching protocol/HEP and wear schedule of post surgical compression garment.  Given: Symptoms/condition management  Program: New  How Provided: Verbal  Provided to: Patient  Level of Understanding: Verbalized, Demonstrated  31184 - OT Self Care/Mgmt Minutes: 50     Pt. provided education on orthopedic and lymph fluid dynamic changes from lumpectomy and sentinel node removal surgery, post-surgical compression education and guidance, activity guidelines and weight bearing restrictions and education on a stretching HEP.  Pt. educated on the benefits of the use of post-surgical compression following her surgery.  Patient was educated to wear compression 24/7 until released by her surgeon or OT.  Size: 40/42 Patient educated on the benefit to a minimum of 2 post- surgical garments to have one to wash and one to wear to support hygiene and prevent infection.      OT Goals     Row Name 10/21/21 1340          Time Calculation    OT Goal Re-Cert Due Date 11/20/21  -           User Key  (r) = Recorded By, (t) = Taken By, (c) = Cosigned By    Initials Name Provider Type    Lenore Mendieta OT Occupational Therapist              GOALS:  1. Post Breast Surgery Care/at risk for Lymphedema  LTG 1: 90 days:  As an indicator of no exacerbation of lymphedema staging, the patient will present with a total  lymphatic volume less than 5% from  baseline.   STATUS: New  STG 1a:   30 days: To prevent exacerbation of mixed edema to lymphedema, patient will utilize the 2 postsurgical compression garments daily.      STATUS: New  STG 1b: 30 days: Patient will be independent with self-manual lymphatic massage.    STATUS: New  STG 1c: 30 days:  Patient will be independent with identification of signs and symptoms of lymphedema exasperation per stoplight to recovery education handout.   STATUS: New  STG 1 d: 30 days: Patient will be independent with HEP to prevent advancement in lymphedema staging.   STATUS: New  TREATMENT:  Self Care/ADL retraining, Therapeutic Activity, Neuromuscular Re-education, Therapeutic Exercise, Bioimpedence Fluid Analysis, Post-Surgical compression garement [id number] [facility], Orthotic Management and training,  and Manual Therapy.     OT Assessment/Plan     Row Name 10/21/21 6667          OT Assessment    Functional Limitations Performance in self-care ADL; Other (comment)  at risk for lymphedema  -     Assessment Comments Pt. will benefit from continued skilled OT intervention to evaluate  ROM, scar tissue and lymphatic dynamics to prevent ADL decline and advancement of lymphedema staging.  -     OT Rehab Potential Excellent  -     Patient/caregiver participated in establishment of treatment plan and goals Yes  -            OT Plan    OT Frequency Other (comment)  0-1x/wk  -     Predicted Duration of Therapy Intervention (OT) Pt. to re-evaluated 3 weeks post-surgery, 3 weeks post XRT, every 3 months from baseline for years 1-3 and every 6 months years 4 and 5 .  -           User Key  (r) = Recorded By, (t) = Taken By, (c) = Cosigned By    Initials Name Provider Type    Lenore Mendieta OT Occupational Therapist               Examination:   Performance Deficits include: Bathing and showering, dressing, community mobility, sleep, home management, health management   # deficits  affecting performance: 5 or more  Decision Making:   Treatment Options Considered : Health promotion, remediation/restoration, adaptation, prevention  # Treatment options considered : Multiple (4+)  Modification Made for: None  Level of Task Modification: None  Comorbidity Affect Performance: None  How is performance affected: N/A  Evaluation Level Determined: Low            Time Calculation:   Timed Charges  23328 - OT Self Care/Mgmt Minutes: 50  Untimed Charges  OT Eval/Re-eval Minutes: 10  Total Minutes  Timed Charges Total Minutes: 50  Untimed Charges Total Minutes: 10   Total Minutes: 60     Therapy Charges for Today     Code Description Service Date Service Provider Modifiers Qty    [id number]  OT SELF CARE/MGMT/TRAIN EA 15 MIN 10/21/2021 Lenore Rider OT GO 3    [id number]  OT EVAL LOW COMPLEXITY 1 10/21/2021 Lenore Rider OT GO 1                    Lenore Rider OT  10/21/2021

## 2021-10-26 ENCOUNTER — HOSPITAL ENCOUNTER (OUTPATIENT)
Facility: HOSPITAL | Age: 61
Setting detail: HOSPITAL OUTPATIENT SURGERY
Discharge: HOME | End: 2021-10-26
Attending: SURGERY | Admitting: SURGERY

## 2021-10-26 ENCOUNTER — ANESTHESIA (OUTPATIENT)
Dept: PERIOP | Facility: HOSPITAL | Age: 61
End: 2021-10-26

## 2021-10-26 ENCOUNTER — HOSPITAL ENCOUNTER (OUTPATIENT)
Dept: NUCLEAR MEDICINE | Facility: HOSPITAL | Age: 61
Discharge: HOME | End: 2021-10-26

## 2021-10-26 ENCOUNTER — HOSPITAL ENCOUNTER (OUTPATIENT)
Dept: MAMMOGRAPHY | Facility: HOSPITAL | Age: 61
Setting detail: HOSPITAL OUTPATIENT SURGERY
Discharge: HOME | End: 2021-10-26

## 2021-10-26 ENCOUNTER — ANESTHESIA EVENT (OUTPATIENT)
Dept: PERIOP | Facility: HOSPITAL | Age: 61
End: 2021-10-26

## 2021-10-26 ENCOUNTER — HOSPITAL ENCOUNTER (OUTPATIENT)
Dept: MAMMOGRAPHY | Facility: HOSPITAL | Age: 61
Discharge: HOME | End: 2021-10-26

## 2021-10-26 VITALS
WEIGHT: 210.32 LBS | DIASTOLIC BLOOD PRESSURE: 72 MMHG | SYSTOLIC BLOOD PRESSURE: 116 MMHG | RESPIRATION RATE: 15 BRPM | HEART RATE: 77 BPM | OXYGEN SATURATION: 93 % | HEIGHT: 63 IN | TEMPERATURE: 98.2 F | BODY MASS INDEX: 37.27 KG/M2

## 2021-10-26 DIAGNOSIS — C50.912 MALIGNANT NEOPLASM OF LEFT BREAST IN FEMALE, ESTROGEN RECEPTOR POSITIVE, UNSPECIFIED SITE OF BREAST (HCC): ICD-10-CM

## 2021-10-26 DIAGNOSIS — Z17.0 MALIGNANT NEOPLASM OF LEFT BREAST IN FEMALE, ESTROGEN RECEPTOR POSITIVE, UNSPECIFIED SITE OF BREAST (HCC): ICD-10-CM

## 2021-10-26 PROCEDURE — 25010000002 FENTANYL CITRATE (PF) 50 MCG/ML SOLUTION: Performed by: NURSE ANESTHETIST, CERTIFIED REGISTERED

## 2021-10-26 PROCEDURE — 0 TECHETIUM TC99M TILMANOCEPT: Performed by: SURGERY

## 2021-10-26 PROCEDURE — C1889 IMPLANT/INSERT DEVICE, NOC: HCPCS | Performed by: SURGERY

## 2021-10-26 PROCEDURE — C1819 TISSUE LOCALIZATION-EXCISION: HCPCS

## 2021-10-26 PROCEDURE — 25010000002 DEXAMETHASONE PER 1 MG: Performed by: NURSE ANESTHETIST, CERTIFIED REGISTERED

## 2021-10-26 PROCEDURE — 76098 X-RAY EXAM SURGICAL SPECIMEN: CPT

## 2021-10-26 PROCEDURE — 38900 IO MAP OF SENT LYMPH NODE: CPT | Performed by: SURGERY

## 2021-10-26 PROCEDURE — 25010000002 MORPHINE SULFATE (PF) 2 MG/ML SOLUTION: Performed by: NURSE ANESTHETIST, CERTIFIED REGISTERED

## 2021-10-26 PROCEDURE — 88307 TISSUE EXAM BY PATHOLOGIST: CPT | Performed by: SURGERY

## 2021-10-26 PROCEDURE — 88360 TUMOR IMMUNOHISTOCHEM/MANUAL: CPT | Performed by: SURGERY

## 2021-10-26 PROCEDURE — 88341 IMHCHEM/IMCYTCHM EA ADD ANTB: CPT | Performed by: SURGERY

## 2021-10-26 PROCEDURE — 88342 IMHCHEM/IMCYTCHM 1ST ANTB: CPT | Performed by: SURGERY

## 2021-10-26 PROCEDURE — 88305 TISSUE EXAM BY PATHOLOGIST: CPT | Performed by: SURGERY

## 2021-10-26 PROCEDURE — 25010000002 MIDAZOLAM PER 1MG: Performed by: ANESTHESIOLOGY

## 2021-10-26 PROCEDURE — 19301 PARTIAL MASTECTOMY: CPT | Performed by: SURGERY

## 2021-10-26 PROCEDURE — 0 MEPERIDINE PER 100 MG: Performed by: NURSE ANESTHETIST, CERTIFIED REGISTERED

## 2021-10-26 PROCEDURE — 25010000002 PROPOFOL 10 MG/ML EMULSION: Performed by: NURSE ANESTHETIST, CERTIFIED REGISTERED

## 2021-10-26 PROCEDURE — 38792 RA TRACER ID OF SENTINL NODE: CPT

## 2021-10-26 PROCEDURE — 25010000002 ONDANSETRON PER 1 MG: Performed by: NURSE ANESTHETIST, CERTIFIED REGISTERED

## 2021-10-26 PROCEDURE — 25010000002 GENTAMICIN PER 80 MG: Performed by: SURGERY

## 2021-10-26 PROCEDURE — 0 LIDOCAINE 1 % SOLUTION: Performed by: SURGERY

## 2021-10-26 PROCEDURE — A9520 TC99 TILMANOCEPT DIAG 0.5MCI: HCPCS | Performed by: SURGERY

## 2021-10-26 PROCEDURE — 38525 BIOPSY/REMOVAL LYMPH NODES: CPT | Performed by: SURGERY

## 2021-10-26 DEVICE — LIGACLIP MCA MULTIPLE CLIP APPLIERS, 20 MEDIUM CLIPS
Type: IMPLANTABLE DEVICE | Site: BREAST | Status: FUNCTIONAL
Brand: LIGACLIP

## 2021-10-26 RX ORDER — BUPIVACAINE HYDROCHLORIDE 2.5 MG/ML
INJECTION, SOLUTION EPIDURAL; INFILTRATION; INTRACAUDAL AS NEEDED
Status: DISCONTINUED | OUTPATIENT
Start: 2021-10-26 | End: 2021-10-26 | Stop reason: HOSPADM

## 2021-10-26 RX ORDER — OXYCODONE HYDROCHLORIDE 5 MG/1
5 TABLET ORAL
Status: DISCONTINUED | OUTPATIENT
Start: 2021-10-26 | End: 2021-10-26 | Stop reason: HOSPADM

## 2021-10-26 RX ORDER — PROMETHAZINE HYDROCHLORIDE 12.5 MG/1
25 TABLET ORAL ONCE AS NEEDED
Status: DISCONTINUED | OUTPATIENT
Start: 2021-10-26 | End: 2021-10-26 | Stop reason: HOSPADM

## 2021-10-26 RX ORDER — LIDOCAINE HYDROCHLORIDE 20 MG/ML
INJECTION, SOLUTION INFILTRATION; PERINEURAL AS NEEDED
Status: DISCONTINUED | OUTPATIENT
Start: 2021-10-26 | End: 2021-10-26 | Stop reason: SURG

## 2021-10-26 RX ORDER — GLYCOPYRROLATE 0.2 MG/ML
0.2 INJECTION INTRAMUSCULAR; INTRAVENOUS
Status: COMPLETED | OUTPATIENT
Start: 2021-10-26 | End: 2021-10-26

## 2021-10-26 RX ORDER — ONDANSETRON 4 MG/1
4 TABLET, FILM COATED ORAL ONCE AS NEEDED
Status: DISCONTINUED | OUTPATIENT
Start: 2021-10-26 | End: 2021-10-26 | Stop reason: HOSPADM

## 2021-10-26 RX ORDER — HYDROCODONE BITARTRATE AND ACETAMINOPHEN 5; 325 MG/1; MG/1
1-2 TABLET ORAL EVERY 4 HOURS PRN
Qty: 25 TABLET | Refills: 0 | Status: SHIPPED | OUTPATIENT
Start: 2021-10-26 | End: 2021-11-18

## 2021-10-26 RX ORDER — ONDANSETRON 2 MG/ML
4 INJECTION INTRAMUSCULAR; INTRAVENOUS ONCE AS NEEDED
Status: DISCONTINUED | OUTPATIENT
Start: 2021-10-26 | End: 2021-10-26 | Stop reason: HOSPADM

## 2021-10-26 RX ORDER — MEPERIDINE HYDROCHLORIDE 25 MG/ML
12.5 INJECTION INTRAMUSCULAR; INTRAVENOUS; SUBCUTANEOUS
Status: COMPLETED | OUTPATIENT
Start: 2021-10-26 | End: 2021-10-26

## 2021-10-26 RX ORDER — DEXAMETHASONE SODIUM PHOSPHATE 4 MG/ML
INJECTION, SOLUTION INTRA-ARTICULAR; INTRALESIONAL; INTRAMUSCULAR; INTRAVENOUS; SOFT TISSUE AS NEEDED
Status: DISCONTINUED | OUTPATIENT
Start: 2021-10-26 | End: 2021-10-26 | Stop reason: SURG

## 2021-10-26 RX ORDER — ACETAMINOPHEN 500 MG
1000 TABLET ORAL ONCE
Status: COMPLETED | OUTPATIENT
Start: 2021-10-26 | End: 2021-10-26

## 2021-10-26 RX ORDER — PROPOFOL 10 MG/ML
VIAL (ML) INTRAVENOUS AS NEEDED
Status: DISCONTINUED | OUTPATIENT
Start: 2021-10-26 | End: 2021-10-26 | Stop reason: SURG

## 2021-10-26 RX ORDER — MIDAZOLAM HYDROCHLORIDE 2 MG/2ML
2 INJECTION, SOLUTION INTRAMUSCULAR; INTRAVENOUS ONCE
Status: COMPLETED | OUTPATIENT
Start: 2021-10-26 | End: 2021-10-26

## 2021-10-26 RX ORDER — CLINDAMYCIN PHOSPHATE 900 MG/50ML
900 INJECTION INTRAVENOUS ONCE
Status: COMPLETED | OUTPATIENT
Start: 2021-10-26 | End: 2021-10-26

## 2021-10-26 RX ORDER — SODIUM CHLORIDE, SODIUM LACTATE, POTASSIUM CHLORIDE, CALCIUM CHLORIDE 600; 310; 30; 20 MG/100ML; MG/100ML; MG/100ML; MG/100ML
9 INJECTION, SOLUTION INTRAVENOUS CONTINUOUS PRN
Status: DISCONTINUED | OUTPATIENT
Start: 2021-10-26 | End: 2021-10-26 | Stop reason: HOSPADM

## 2021-10-26 RX ORDER — MORPHINE SULFATE 2 MG/ML
4 INJECTION, SOLUTION INTRAMUSCULAR; INTRAVENOUS ONCE
Status: COMPLETED | OUTPATIENT
Start: 2021-10-26 | End: 2021-10-26

## 2021-10-26 RX ORDER — ONDANSETRON 2 MG/ML
INJECTION INTRAMUSCULAR; INTRAVENOUS AS NEEDED
Status: DISCONTINUED | OUTPATIENT
Start: 2021-10-26 | End: 2021-10-26 | Stop reason: SURG

## 2021-10-26 RX ORDER — PROMETHAZINE HYDROCHLORIDE 25 MG/1
25 SUPPOSITORY RECTAL ONCE AS NEEDED
Status: DISCONTINUED | OUTPATIENT
Start: 2021-10-26 | End: 2021-10-26 | Stop reason: HOSPADM

## 2021-10-26 RX ORDER — FENTANYL CITRATE 50 UG/ML
INJECTION, SOLUTION INTRAMUSCULAR; INTRAVENOUS AS NEEDED
Status: DISCONTINUED | OUTPATIENT
Start: 2021-10-26 | End: 2021-10-26 | Stop reason: SURG

## 2021-10-26 RX ORDER — BACLOFEN 10 MG/1
10 TABLET ORAL NIGHTLY
COMMUNITY

## 2021-10-26 RX ORDER — LIDOCAINE HYDROCHLORIDE 10 MG/ML
20 INJECTION, SOLUTION INFILTRATION; PERINEURAL ONCE
Status: COMPLETED | OUTPATIENT
Start: 2021-10-26 | End: 2021-10-26

## 2021-10-26 RX ADMIN — ONDANSETRON 4 MG: 2 INJECTION INTRAMUSCULAR; INTRAVENOUS at 13:07

## 2021-10-26 RX ADMIN — PROPOFOL 150 MG: 10 INJECTION, EMULSION INTRAVENOUS at 13:07

## 2021-10-26 RX ADMIN — FENTANYL CITRATE 100 MCG: 50 INJECTION, SOLUTION INTRAMUSCULAR; INTRAVENOUS at 13:07

## 2021-10-26 RX ADMIN — LIDOCAINE HYDROCHLORIDE 50 MG: 20 INJECTION, SOLUTION INFILTRATION; PERINEURAL at 13:07

## 2021-10-26 RX ADMIN — ACETAMINOPHEN 1000 MG: 500 TABLET ORAL at 12:02

## 2021-10-26 RX ADMIN — MIDAZOLAM HYDROCHLORIDE 2 MG: 1 INJECTION, SOLUTION INTRAMUSCULAR; INTRAVENOUS at 12:51

## 2021-10-26 RX ADMIN — MEPERIDINE HYDROCHLORIDE 12.5 MG: 25 INJECTION INTRAMUSCULAR; INTRAVENOUS; SUBCUTANEOUS at 14:31

## 2021-10-26 RX ADMIN — CLINDAMYCIN IN 5 PERCENT DEXTROSE 900 MG: 18 INJECTION, SOLUTION INTRAVENOUS at 13:05

## 2021-10-26 RX ADMIN — MEPERIDINE HYDROCHLORIDE 12.5 MG: 25 INJECTION INTRAMUSCULAR; INTRAVENOUS; SUBCUTANEOUS at 14:48

## 2021-10-26 RX ADMIN — TILMANOCEPT 1 DOSE: KIT at 11:17

## 2021-10-26 RX ADMIN — SODIUM CHLORIDE, POTASSIUM CHLORIDE, SODIUM LACTATE AND CALCIUM CHLORIDE 9 ML/HR: 600; 310; 30; 20 INJECTION, SOLUTION INTRAVENOUS at 12:02

## 2021-10-26 RX ADMIN — LIDOCAINE HYDROCHLORIDE 20 ML: 10 INJECTION, SOLUTION INFILTRATION; PERINEURAL at 10:39

## 2021-10-26 RX ADMIN — GLYCOPYRROLATE 0.2 MG: 0.2 INJECTION INTRAMUSCULAR; INTRAVENOUS at 12:52

## 2021-10-26 RX ADMIN — DEXAMETHASONE SODIUM PHOSPHATE 4 MG: 4 INJECTION INTRA-ARTICULAR; INTRALESIONAL; INTRAMUSCULAR; INTRAVENOUS; SOFT TISSUE at 13:07

## 2021-10-26 RX ADMIN — MORPHINE SULFATE 4 MG: 2 INJECTION, SOLUTION INTRAMUSCULAR; INTRAVENOUS at 14:50

## 2021-10-26 NOTE — ANESTHESIA PREPROCEDURE EVALUATION
Anesthesia Evaluation     Patient summary reviewed and Nursing notes reviewed                Airway   Mallampati: I  TM distance: >3 FB  Neck ROM: full  No difficulty expected  Dental      Pulmonary - negative pulmonary ROS and normal exam    breath sounds clear to auscultation  Cardiovascular - normal exam    Rhythm: regular    (+) hypertension,       Neuro/Psych- negative ROS  GI/Hepatic/Renal/Endo    (+) obesity,  GERD,      Musculoskeletal (-) negative ROS    Abdominal    Substance History - negative use     OB/GYN negative ob/gyn ROS         Other      history of cancer                  Anesthesia Plan    ASA 3     general     intravenous induction     Anesthetic plan, all risks, benefits, and alternatives have been provided, discussed and informed consent has been obtained with: patient.      
Neuro

## 2021-10-26 NOTE — ANESTHESIA POSTPROCEDURE EVALUATION
Patient: Margy Jimenez    Procedure Summary     Date: 10/26/21 Room / Location: [facility] OR 05 / [facility] MAIN OR    Anesthesia Start: 1305 Anesthesia Stop: 1421    Procedure: LEFT BREAST LUMPECTOMY WITH SENTINEL NODE BIOPSY AND NEEDLE LOCALIZATION (Left Breast) Diagnosis:       Malignant neoplasm of left breast in female, estrogen receptor positive, unspecified site of breast (HCC)      (Malignant neoplasm of left breast in female, estrogen receptor positive, unspecified site of breast (HCC) [C50.912, Z17.0])    Surgeons: Ayah Chaparro MD Provider: Norma Lay MD    Anesthesia Type: general ASA Status: 3          Anesthesia Type: general    Vitals  Vitals Value Taken Time   /82 10/26/21 1530   Temp 36.4 °C (97.6 °F) 10/26/21 1419   Pulse 69 10/26/21 1534   Resp 14 10/26/21 1530   SpO2 96 % 10/26/21 1534   Vitals shown include unvalidated device data.        Post Anesthesia Care and Evaluation    Patient location during evaluation: bedside  Patient participation: complete - patient participated  Level of consciousness: awake  Pain management: adequate  Airway patency: patent  Anesthetic complications: No anesthetic complications  PONV Status: none  Cardiovascular status: acceptable  Respiratory status: acceptable  Hydration status: acceptable    Comments: An Anesthesiologist personally participated in the most demanding procedures (including induction and emergence if applicable) in the anesthesia plan, monitored the course of anesthesia administration at frequent intervals and remained physically present and available for immediate diagnosis and treatment of emergencies.

## 2021-10-27 ENCOUNTER — TELEPHONE (OUTPATIENT)
Dept: MAMMOGRAPHY | Facility: HOSPITAL | Age: 61
End: 2021-10-27

## 2021-10-27 ENCOUNTER — TELEPHONE (OUTPATIENT)
Dept: UROLOGY | Facility: CLINIC | Age: 61
End: 2021-10-27

## 2021-10-27 ENCOUNTER — TELEPHONE (OUTPATIENT)
Dept: SURGERY | Facility: CLINIC | Age: 61
End: 2021-10-27

## 2021-10-27 DIAGNOSIS — Z17.0 MALIGNANT NEOPLASM OF LEFT BREAST IN FEMALE, ESTROGEN RECEPTOR POSITIVE, UNSPECIFIED SITE OF BREAST (HCC): Primary | ICD-10-CM

## 2021-10-27 DIAGNOSIS — C50.912 MALIGNANT NEOPLASM OF LEFT BREAST IN FEMALE, ESTROGEN RECEPTOR POSITIVE, UNSPECIFIED SITE OF BREAST (HCC): Primary | ICD-10-CM

## 2021-10-27 DIAGNOSIS — R52 MODERATE PAIN: Primary | ICD-10-CM

## 2021-10-27 RX ORDER — OXYCODONE HYDROCHLORIDE AND ACETAMINOPHEN 5; 325 MG/1; MG/1
1 TABLET ORAL EVERY 4 HOURS PRN
Qty: 18 TABLET | Refills: 0 | Status: SHIPPED | OUTPATIENT
Start: 2021-10-27 | End: 2021-11-18

## 2021-10-27 RX ORDER — OXYCODONE HYDROCHLORIDE AND ACETAMINOPHEN 5; 325 MG/1; MG/1
1 TABLET ORAL EVERY 6 HOURS PRN
Qty: 20 TABLET | Refills: 0 | Status: SHIPPED | OUTPATIENT
Start: 2021-10-27 | End: 2021-11-18

## 2021-10-27 NOTE — TELEPHONE ENCOUNTER
Pt is aware that medication was sent into the [facility] in [place]. I called anna and alex the other pain medication and spoke to Des NEUMANN Educated Pt on allergies and she stated it was given to her at the hospital yesterday and she was okay. Pt have benadryl on hand if needed

## 2021-10-27 NOTE — TELEPHONE ENCOUNTER
PT CALLED AND STATED THAT SHE WAS FEELING JITTERY AND ITCHY WITH NO RASH. SHE HAS PAIN CONTROL, BUT NO SLEEP. SHE WANTED TO CONTACT DR. ALVARADO'S OFFICE TO ASK FOR ANOTHER SUGGESTION ON PAIN CONTROL AND SHE STATED THAT SHE COULD NOT GET THROUGH. WHILE ON THE PHONE WITH THE PATIENT I CALLED THE OFFICE AND DID GET THROUGH TO [facility] AND TOLD HER WHAT THE PATIENT WAS TELLING ME AND I ALSO SENT JUAQUIN A SECURE CHAT WHILE THE PATIENT WAS STILL ON THE PHONE WITH ME AND THE PATIENT WAS VERY THANKFUL FOR MY HELP.

## 2021-10-27 NOTE — TELEPHONE ENCOUNTER
Pt had surgery yesterday and was prescribed Hydrocodone 5/35. It is controlling her pain but she is itching and feeling jittery and can't sleep. She has taken Benadryl but still feels that way. Can she have something else. Please call patient and let her know.

## 2021-11-01 LAB
CYTO UR: NORMAL
LAB AP CASE REPORT: NORMAL
LAB AP CLINICAL INFORMATION: NORMAL
LAB AP SPECIAL STAINS: NORMAL
LAB AP SYNOPTIC CHECKLIST: NORMAL
PATH REPORT.ADDENDUM SPEC: NORMAL
PATH REPORT.FINAL DX SPEC: NORMAL
PATH REPORT.GROSS SPEC: NORMAL

## 2021-11-02 ENCOUNTER — TREATMENT (OUTPATIENT)
Dept: OCCUPATIONAL THERAPY | Facility: HOSPITAL | Age: 61
End: 2021-11-02

## 2021-11-02 PROCEDURE — L8015 EXT BREASTPROSTHESIS GARMENT: HCPCS

## 2021-11-05 RX ORDER — DICLOFENAC SODIUM 75 MG/1
75 TABLET, DELAYED RELEASE ORAL DAILY
COMMUNITY
Start: 2021-10-07 | End: 2022-04-12

## 2021-11-08 ENCOUNTER — OFFICE VISIT (OUTPATIENT)
Dept: SURGERY | Facility: CLINIC | Age: 61
End: 2021-11-08

## 2021-11-08 VITALS — HEIGHT: 63 IN | BODY MASS INDEX: 37.21 KG/M2 | WEIGHT: 210 LBS | RESPIRATION RATE: 15 BRPM

## 2021-11-08 DIAGNOSIS — C50.912 MALIGNANT NEOPLASM OF LEFT BREAST IN FEMALE, ESTROGEN RECEPTOR POSITIVE, UNSPECIFIED SITE OF BREAST (HCC): Primary | ICD-10-CM

## 2021-11-08 DIAGNOSIS — Z17.0 MALIGNANT NEOPLASM OF LEFT BREAST IN FEMALE, ESTROGEN RECEPTOR POSITIVE, UNSPECIFIED SITE OF BREAST (HCC): Primary | ICD-10-CM

## 2021-11-08 PROCEDURE — 99024 POSTOP FOLLOW-UP VISIT: CPT | Performed by: SURGERY

## 2021-11-08 RX ORDER — BUSPIRONE HYDROCHLORIDE 10 MG/1
10 TABLET ORAL NIGHTLY
COMMUNITY
Start: 2021-10-26 | End: 2022-04-12 | Stop reason: SDUPTHER

## 2021-11-09 ENCOUNTER — PREP FOR SURGERY (OUTPATIENT)
Dept: OTHER | Facility: HOSPITAL | Age: 61
End: 2021-11-09

## 2021-11-09 ENCOUNTER — OFFICE VISIT (OUTPATIENT)
Dept: PLASTIC SURGERY | Facility: CLINIC | Age: 61
End: 2021-11-09

## 2021-11-09 VITALS
BODY MASS INDEX: 37.21 KG/M2 | DIASTOLIC BLOOD PRESSURE: 86 MMHG | HEIGHT: 63 IN | WEIGHT: 210 LBS | OXYGEN SATURATION: 92 % | TEMPERATURE: 97.1 F | SYSTOLIC BLOOD PRESSURE: 128 MMHG | HEART RATE: 74 BPM

## 2021-11-09 DIAGNOSIS — Z01.818 PRE-OPERATIVE EXAMINATION: Primary | ICD-10-CM

## 2021-11-09 DIAGNOSIS — Z17.0 MALIGNANT NEOPLASM OF LEFT BREAST IN FEMALE, ESTROGEN RECEPTOR POSITIVE, UNSPECIFIED SITE OF BREAST (HCC): ICD-10-CM

## 2021-11-09 DIAGNOSIS — C50.912 MALIGNANT NEOPLASM OF LEFT BREAST IN FEMALE, ESTROGEN RECEPTOR POSITIVE, UNSPECIFIED SITE OF BREAST (HCC): ICD-10-CM

## 2021-11-09 PROCEDURE — 99215 OFFICE O/P EST HI 40 MIN: CPT | Performed by: NURSE PRACTITIONER

## 2021-11-09 RX ORDER — CELECOXIB 200 MG/1
200 CAPSULE ORAL 2 TIMES DAILY
Qty: 24 CAPSULE | Refills: 0 | Status: SHIPPED | OUTPATIENT
Start: 2021-11-09 | End: 2022-04-12

## 2021-11-09 RX ORDER — OXYCODONE HYDROCHLORIDE AND ACETAMINOPHEN 5; 325 MG/1; MG/1
1 TABLET ORAL EVERY 6 HOURS PRN
Qty: 28 TABLET | Refills: 0 | Status: SHIPPED | OUTPATIENT
Start: 2021-11-09 | End: 2021-11-18

## 2021-11-09 RX ORDER — ACETAMINOPHEN 325 MG/1
650 TABLET ORAL EVERY 4 HOURS PRN
Qty: 30 TABLET | Refills: 0 | Status: SHIPPED | OUTPATIENT
Start: 2021-11-09 | End: 2021-11-18

## 2021-11-09 RX ORDER — PROMETHAZINE HYDROCHLORIDE 25 MG/1
25 TABLET ORAL EVERY 6 HOURS PRN
Qty: 10 TABLET | Refills: 0 | Status: SHIPPED | OUTPATIENT
Start: 2021-11-09 | End: 2022-04-20

## 2021-11-09 RX ORDER — CLINDAMYCIN HYDROCHLORIDE 150 MG/1
150 CAPSULE ORAL 4 TIMES DAILY
Qty: 40 CAPSULE | Refills: 0 | Status: SHIPPED | OUTPATIENT
Start: 2021-11-09 | End: 2021-11-18

## 2021-11-09 NOTE — PROGRESS NOTES
Consult (Breast cancer recon)            History of Present Illness  Margy Jimenez is a 61 y.o. female who presents to [facility] as a consult from Dr. Chaparro to discuss breast reconstructive options. Current bras size DD but would be happy with full C or D. Biopsy:  Invasive ductal carcinoma with tubular features  - Histologic grade (Myles Histologic Score):    - Glandular (Acinar)/Tubular Differentiation:  Score 1  - Nuclear Pleomorphism:  Score 1  - Mitotic Rate:  Score 1  - Overall Grade:  Grade 1               - Size of largest focus of invasion: 7 mm               - Breast biomarker testing:                            - Estrogen Receptor (ER): Positive (100%, strong)                            - Progesterone Receptor (PgR): Positive (90%, strong)                            - HER2 (by immunohistochemistry):  Negative (Score 1+)  - Ki-67: 5%               - Other findings:                            - Atypical ductal hyperplasia (ADH)   Pathology from lumpectomy: Left sentinel node #1, count of 25, biopsy:               -  One lymph node, negative for metastatic carcinoma (0/1)               -   See synoptic report     2. Left sentinel node #2 count of 500, biopsy:               -   One lymph node, negative for metastatic carcinoma (0/1)               -   See synoptic report     3.  Left sentinel node #3 count of 6000, biopsy:               -   Two lymph nodes, negative for metastatic carcinoma (0/ 2)               -   Focal intracapsular nevus               -   See synoptic report     4.  Left breast mass, excision:               -   Tubular carcinoma, negative margins (closest margin: 3 mm, inferior)               -   Ductal carcinoma in situ (DCIS)               -   Atypical lobular hyperplasia               -   See synoptic report     5.  Left breast, extra medial margin, excision:               -   Radial scar               -   Usual duct  "hyperplasia               -   Negative for malignancy     6.  Left breast, lateral nodule, excision:               -   Invasive ductal carcinoma of no special type (ductal) with apocrine features, present at unoriented cauterized margin               -   Ductal carcinoma in situ (DCIS)               -   Atypical lobular hyperplasia               -   See synoptic report     Subjective      Hydromorphone, Hydromorphone hcl, and Cephalexin  Allergies Reconciled.    Review of Systems     Objective     /86 (BP Location: Right arm, Patient Position: Sitting)   Pulse 74   Temp 97.1 °F (36.2 °C) (Temporal)   Ht 160 cm (63\")   Wt 95.3 kg (210 lb)   SpO2 92%   BMI 37.20 kg/m²     Body mass index is 37.2 kg/m².    Physical Exam  Breast:  grade 2 nipple ptosis, left breast resolving eccymosis and mild edema, well healed lumpectomy incision over lateral left breast, no drng   Axillary Lymphadenopathy: No axillary lymphadenopathy  SN-N (Right Breast): 28.5  SN-N (Left Breast): 28  SN-IMF (Right Breast): 11.5  SN-IMF (Left Breast): 11  N-IMF (Right Breast): 18  N-IMF (Left Breast): 18  Base Width (Right Breast): 18  Base Width (Left Breast): 18      Result Review :              Assessment and Plan      Diagnoses and all orders for this visit:    1. Pre-operative examination (Primary)    2. Malignant neoplasm of left breast in female, estrogen receptor positive, unspecified site of breast (HCC)    Other orders  -     promethazine (PHENERGAN) 25 MG tablet; Take 1 tablet by mouth Every 6 (Six) Hours As Needed for Nausea or Vomiting.  Dispense: 10 tablet; Refill: 0  -     oxyCODONE-acetaminophen (Percocet) 5-325 MG per tablet; Take 1 tablet by mouth Every 6 (Six) Hours As Needed for Moderate Pain .  Dispense: 28 tablet; Refill: 0  -     acetaminophen (Tylenol) 325 MG tablet; Take 2 tablets by mouth Every 4 (Four) Hours As Needed for Moderate Pain .  Dispense: 30 tablet; Refill: 0  -     celecoxib (CeleBREX) 200 MG capsule; " Take 1 capsule by mouth 2 (Two) Times a Day.  Dispense: 24 capsule; Refill: 0  -     clindamycin (Cleocin) 150 MG capsule; Take 1 capsule by mouth 4 (Four) Times a Day for 10 days.  Dispense: 40 capsule; Refill: 0        Additional Order(s):       Plan:  • Dr. Pires present for exam and case was discussed  • Breast reconstruction options (Tissue Expander/Implant versus Autologous) were all discussed with the patient at length.  In addition, we discussed options for symmetry procedures and nipple reconstruction.  The patient understands that her reconstructed breast will not have the same sensation as a natural breast and that visible incision lines will always be present.  In addition, patient understand that breast reconstruction is a multi-stage procedure that can take months to years to complete.   • We will send information for prior authorization.  Photos were obtained.   • Patient was given the breast reconstruction pamphlet as well as directed to the ASPS website for additional information.   • The patient was made aware that the FDA has discovered rare occurrences between an uncommon form of lymphoma (anaplastic large cell) and women with breast implants.  While the incidence is quite low, the risk of development is real; therefore, any unusual symptoms or signs (most commonly a late fluid collection years later) should be brought to the attention of your physician.  Patient also counseled on risks and benefits of saline versus silicone implants, lifting restrictions, scar placement, risk of capsular contracture, animation deformity, need for likely revision of breast implants at some point in her life, FDA recommendation of MRI every three years to monitor for rupture, and continued mammography for breast cancer surveillance.   • We had a long discussion with the patient and her family today regarding her reconstructive options.  These include no reconstruction, reconstruction at the time of mastectomy  or lumpectomy, and finally delayed reconstruction.  We discussed specific types of reconstruction, including: tissue expander and/or implants, and autologous reconstruction with either pedicled or free flap.  We also covered the later stages of reconstruction, including nipple reconstruction and areola tattooing, fat grafting, and symmetry procedures if indicated or desired.   • I described the typical pamela-operative course of each procedure, including the nature of the procedure, hospital stay, necessity for drains, post-operative activity restriction, and postoperative visits (including those for tissue expansion).  We also discussed the time frame for reconstruction.  I shared information about saline vs. silicone implants, including their differences, risk of capsular contracture, rippling, or leak/rupture, and safety/monitoring issues.  I described Alloderm or mesh and its use for implant reconstruction. We discussed that radiation therapy, if she ultimately requires it, may alter the reconstructive options.     • We reviewed surgical risks including, but not limited to, infection, bleeding, hematoma, seroma, pain, scarring, numbness, skin or nipple loss, wound healing problems, flap failures including partial or complete flap loss, asymmetry, need for revisions or further surgeries,  and risks associated with anesthesia.   • Additional risks with autologous reconstruction include donor wound morbidities, such as hernias or bulges, wound healing problems, muscle weakness, partial or complete flap loss, and typical surgical risks as listed above.   • The use of biological mesh is not for soft tissue reinforcement. The use of mesh is necessary because the mastectomy dissection pocket is larger than the implant itself. The intention of the mesh is to restrain the displacement of the implant to the axilla, which is a very common complication requiring revision. In my experience, the use of mesh avoids that specific  complication and very often avoids a second surgery. The use of mesh also allows me to perform a safer procedure since it holds the implant in place and alleviates the pressure over the skin that depends only on the subdermal blood supply. Without the mesh, I believe I wouldn't be able to perform direct implant reconstruction and give the optimal result that patient desires and deserves.   •  Specifically on her case, she is a candidate for mastectomies. She would like to be smaller than what she is now  but proportioinate. She was shown implants and expanders. Discussed if skin is comprised then expanders may be placed.  • Consent:  Bilateral breast reconstruction with implant and biological mesh, use of spy, possible expanders, bilateral chest liposuction   • Target implant size: 650 cc  •   • I spent 90 minutes explaining to patient and her daughter about her procedure, after care and planning.   •   • ICD codes:   • C50.412 left upper outer female breast cancer.   • CPT codes:   • 03235-99 - immediate insertion of breast prosthesis following reconstruction   • 48890-15  implantation of biologic implant  • 71477 intravenous injection of agent (SPY)    • Implants:  • Sientra Smooth round gel implants: [id number], [id number], [id number], 85563-186 HP, 90129-474 HP, 92107-661 HP, ((x 3)),   • [id number] ((x1)),   • Expander: [id number] ((x3))   • MESH: GALOFORM 3-D, [id number] (x1)        Follow Up     No follow-ups on file.    Patient was given instructions and counseling regarding her condition. Please see specific information pulled into the AVS if appropriate.     Hoda Espinoza, APRN  11/09/2021

## 2021-11-15 ENCOUNTER — DOCUMENTATION (OUTPATIENT)
Dept: MAMMOGRAPHY | Facility: HOSPITAL | Age: 61
End: 2021-11-15

## 2021-11-15 ENCOUNTER — TELEPHONE (OUTPATIENT)
Dept: ONCOLOGY | Facility: HOSPITAL | Age: 61
End: 2021-11-15

## 2021-11-15 NOTE — PROGRESS NOTES
I RECEIVED A CALL FROM [email or web address] FROM [place] AND SHE ASKED WHICH BLOCK DO WE WANT TESTED. WE LOOKED AT THE PATH REPORT AND WE COULD NOT DETERMINE WHICH BLOCK HAD WHICH TUMOR. I SENT OFF THE THE 9MM DUE TO THE OTHER BEING TO SMALL PER [facility] GUIDELINES AS A 4MM. SO I WANTED THE 9 MM TESTED BUT WE COULD NOT DETERMINE WHICH BLOCK THE 9 MM WAS IN SO I CALLED DR. HUTSON AND HE CLARIFIED THAT, BUT THE MORE AGGRESSIVE TUMOR WOULD BE THE 4MM IN BLOCK 6A. THE 9MM IS GRADE 1 IN BLOCK 4B. I CALLED DR. RUIZ TO ASK WHICH ONE SHE WANTED TESTED AND SHE WANTED BOTH. I CALLED [email or web address] BACK AND GAVE THEM DIRECTIONS TO TEST BOTH AND THE 4MM IN BLOCK 6 A IS MORE AGGRESSIVE WITH A GRADE 2 AND IT WILL BE TESTED FIRST.

## 2021-11-16 ENCOUNTER — HOSPITAL ENCOUNTER (OUTPATIENT)
Dept: OCCUPATIONAL THERAPY | Facility: HOSPITAL | Age: 61
Setting detail: THERAPIES SERIES
Discharge: HOME | End: 2021-11-16

## 2021-11-16 ENCOUNTER — TELEPHONE (OUTPATIENT)
Dept: UROLOGY | Facility: CLINIC | Age: 61
End: 2021-11-16

## 2021-11-16 DIAGNOSIS — Z91.89 AT RISK FOR LYMPHEDEMA: Primary | ICD-10-CM

## 2021-11-16 DIAGNOSIS — Z17.0 MALIGNANT NEOPLASM OF LEFT BREAST IN FEMALE, ESTROGEN RECEPTOR POSITIVE, UNSPECIFIED SITE OF BREAST (HCC): ICD-10-CM

## 2021-11-16 DIAGNOSIS — C50.912 MALIGNANT NEOPLASM OF LEFT BREAST IN FEMALE, ESTROGEN RECEPTOR POSITIVE, UNSPECIFIED SITE OF BREAST (HCC): Primary | ICD-10-CM

## 2021-11-16 DIAGNOSIS — C50.912 MALIGNANT NEOPLASM OF LEFT BREAST IN FEMALE, ESTROGEN RECEPTOR POSITIVE, UNSPECIFIED SITE OF BREAST (HCC): ICD-10-CM

## 2021-11-16 DIAGNOSIS — Z17.0 MALIGNANT NEOPLASM OF LEFT BREAST IN FEMALE, ESTROGEN RECEPTOR POSITIVE, UNSPECIFIED SITE OF BREAST (HCC): Primary | ICD-10-CM

## 2021-11-16 PROCEDURE — 97535 SELF CARE MNGMENT TRAINING: CPT

## 2021-11-16 PROCEDURE — 97110 THERAPEUTIC EXERCISES: CPT

## 2021-11-16 PROCEDURE — 97140 MANUAL THERAPY 1/> REGIONS: CPT

## 2021-11-16 NOTE — THERAPY RE-EVALUATION
Outpatient Occupational Therapy Lymphedema Re-Evaluation   [place]     Patient Name: Margy Jimenez  : 1960  MRN: [id number]  Today's Date: 2021      Visit Date: 2021    Patient Active Problem List   Diagnosis   • High blood pressure   • Mood disorder (HCC)   • Anxiety   • Biceps tendinitis, right   • Fatigue   • GERD (gastroesophageal reflux disease)   • Hyperlipidemia   • Migraine   • Nontraumatic incomplete tear of right rotator cuff   • Pain and swelling of right shoulder   • S/P rotator cuff repair   • Shoulder impingement, right   • Hypertension   • Depression   • Gastroesophageal reflux disease   • Heartburn   • Malignant neoplasm of left breast in female, estrogen receptor positive (HCC)        Past Medical History:   Diagnosis Date   • GERD (gastroesophageal reflux disease)    • High blood pressure    • Hyperlipidemia    • Mood disorder (HCC)    • Neoplasm of left breast         Past Surgical History:   Procedure Laterality Date   • ABDOMINAL HYSTERECTOMY     • BREAST BIOPSY Left 10/26/2021    Procedure: LEFT BREAST LUMPECTOMY WITH SENTINEL NODE BIOPSY AND NEEDLE LOCALIZATION;  Surgeon: Ayah Chaparro MD;  Location: [facility] MAIN OR;  Service: General;  Laterality: Left;   • BUNIONECTOMY     • COLONOSCOPY      2018    • COLONOSCOPY N/A 2021    Procedure: COLONOSCOPY WITH INJECTION ORISE/POLYPECTOMY HOT SNARE/ENDO CLIP X3;  Surgeon: David Hernandez MD;  Location: [facility] ENDOSCOPY;  Service: Gastroenterology;  Laterality: N/A;  COLON POLYP   • CYSTOSCOPY     • ENDOSCOPY     • ENDOSCOPY N/A 2021    Procedure: ESOPHAGOGASTRODUODENOSCOPY WITH BIOPSY;  Surgeon: David Hernandez MD;  Location: [facility] ENDOSCOPY;  Service: Gastroenterology;  Laterality: N/A;  GASTRIC POLYPS/REFLUX ESOPHAGITIS   • LAPAROSCOPIC CHOLECYSTECTOMY     • ROTATOR CUFF REPAIR Right          Visit Dx:     ICD-10-CM ICD-9-CM   1. At risk for lymphedema  Z91.89 V49.89   2. Malignant  neoplasm of left breast in female, estrogen receptor positive, unspecified site of breast ([facility])  C50.912 174.9    Z17.0 V86.0        Patient History     Row Name 11/16/21 0800             History    Chief Complaint Other 1 (comment)  -MP      Brief Description of Current Complaint Pt is a 61 year old female who had a lumpectomy w/ sentinel node biopsy  -MP      Hand Dominance right-handed  -MP              Fall Risk Assessment    Any falls in the past year: No  -MP      Does patient have a fear of falling No  -MP              Services    Prior Rehab/Home Health Experiences No  -MP      Are you currently receiving Home Health services No  -MP              Daily Activities    Primary Language English  -MP      Are you able to read Yes  -MP      Are you able to write Yes  -MP      How does patient learn best? Demonstration  -MP      Barriers to learning None  -MP              Safety    Are you being hurt, hit, or frightened by anyone at home or in your life? No  -MP      Are you being neglected by a caregiver No  -MP      Have you had any of the following issues with Depression; Anxiety  -MP            User Key  (r) = Recorded By, (t) = Taken By, (c) = Cosigned By    Initials Name Provider Type    MP Dara Valdez OT Occupational Therapist                 Lymphedema     Row Name 11/16/21 0800             Subjective Pain    Able to rate subjective pain? yes  -MP      Pre-Treatment Pain Level 0  -MP      Post-Treatment Pain Level 0  -MP              Subjective Comments    Subjective Comments No pain in surgical site.  -MP              Lymphedema Assessment    Lymphedema Classification LUE:; at risk/stage 0; stage 1 (Spontaneously Reversible)  -MP      Lymphedema Surgery Comments Lumpectomy  -MP      Lymph Nodes Removed # 3  -MP      Positive Lymph Nodes # 0  -MP      Lymphedema Assessment Comments Next surgery is scheduled for 12/7/21  -MP              Physical Concerns    The amount of pain associated with my  "lymphedema is: 0  -MP      The amount of limb heaviness associated with my lymphedema is: 0  -MP      The amount of skin tightness associated with my lymphedema is: 0  -MP      The size of my swollen limb(s) seems: 0  -MP      Lymphedema affects the movement of my swollen limb(s): 0  -MP      The strength in my swollen limb(s) is: 0  -MP              Psychosocial Concerns    Lymphedema affects my body image (i.e., \"how I think I look\"). 0  -MP      Lymphedema affects my socializing with others. 0  -MP      Lymphedema affects my intimate relations with spouse or partner (rate 0 if not applicable 0  -MP      Lymphedema \"gets me down\" (i.e., depression, frustration, or anger) 0  -MP      I must rely on others for help due to my lymphedema. 0  -MP      I know what to do to manage my lymphedema 2  -MP              Functional Concerns    Lymphedema affects my ability to perform self-care activities (i.e. eating, dressing, hygiene) 0  -MP      Lymphedema affects my ability to perform routine home or work-related activities. 0  -MP      Lymphedema affects my performance of preferred leisure activities. 0  -MP      Lymphedema affects proper fit of clothing/shoes 0  -MP      Lymphedema affects my sleep 0  -MP              Posture/Observations    Posture- WNL Posture is WNL  -MP              General ROM    GENERAL ROM COMMENTS B UE WFL  -MP              Skin Changes/Observations    Location/Assessment Upper Quadrant  -MP      Upper Quadrant Conditions left:; normal  -MP      Skin Observations Comment Scar  is well-healed w/ no drainage and normal color  -MP              Lymphedema Measurements    Measurement Type(s) Circumferential  -MP              Lymphedema Life Impact Scale Totals    A.  Total Q1 - Q17 (Do not include Q18) 2  -MP      B.  Total number of questions answered (Q1-Q17) 17  -MP      C. Divide A by B 0.12  -MP      D. Multiple C by 25 3  -MP            User Key  (r) = Recorded By, (t) = Taken By, (c) = Cosigned " By    Initials Name Provider Type    Dara Connors, OT Occupational Therapist              Circumferential Measurements:               Baseline: R: 1669.99ml  L: 1711.86 ml  % difference in volume: L 8.08 greater than R    Therapy Education  Education Details: Patient provided with education on a simple self-manual lymphatic drainage technique.  Handouts provided.   Patient exhibited good return demonstration of skill.  Patient will benefit from continued education to ensure carryover skill.Patient provided education on risk factors for lymphedema to include greater than 6 lymph node removal, BMI greater than 30, mastectomy versus lumpectomy, radiation therapy, and Taxol use and advanced age.  Patients risk factors include lymph volume increase as noted at this session.  Patient provided with the LeAP lymphedema action plan stoplight to recovery handout (Rehabilitation Oncology: July 2019 - Volume 37 - Issue 3 - p 122-127 doi: 10.1097/01.[id number]) to improve patient's ability to identify lymph exacerbation and provide guidance on appropriate action to be taken to control symptoms. Pt demonstrated knowledge of current HEP.   She was educated that HEP activities will change after Mastectomy/reconstruction.  Given: HEP, Symptoms/condition management  Program: New, Reinforced  How Provided: Verbal, Demonstration, Written  Provided to: Patient  Level of Understanding: Teach back education performed, Verbalized, Demonstrated  10567 - OT Self Care/Mgmt Minutes: 15     GOALS:  1. Post Breast Surgery Care/at risk for Lymphedema  LTG 1: 90 days:  As an indicator of no exacerbation of lymphedema staging, the patient will present with a total lymphatic volume less than 5% from  baseline.              STATUS: Not met. 8.08 increase at R vs L.  Ongoing.     STG 1a:   30 days: To prevent exacerbation of mixed edema to lymphedema, patient will utilize the 2 postsurgical compression garments daily.                  STATUS: Not met. Ongoing.   STG 1b: 30 days: Patient will be independent with self-manual lymphatic massage.               STATUS: Met. Ongoing.   STG 1c: 30 days:  Patient will be independent with identification of signs and symptoms of lymphedema exasperation per stoplight to recovery education handout.              STATUS: Met. Ongoing.   STG 1 d: 30 days: Patient will be independent with HEP to prevent advancement in lymphedema staging.              STATUS:  Met. Ongoing.   TREATMENT:  Self Care/ADL retraining, Therapeutic Activity, Neuromuscular Re-education, Therapeutic Exercise, Bioimpedence Fluid Analysis, Post-Surgical compression garement [facility], Orthotic Management and training,  and Manual Therapy.    Manual Rx (last 36 hours)     Manual Treatments     Row Name 11/16/21 1139             Total Minutes    44697 - OT Manual Therapy Minutes 15  -MP            User Key  (r) = Recorded By, (t) = Taken By, (c) = Cosigned By    Initials Name Provider Type    Dara Connors OT Occupational Therapist               OT Goals     Row Name 11/16/21 1139          Time Calculation    OT Goal Re-Cert Due Date 12/16/21  -MP           User Key  (r) = Recorded By, (t) = Taken By, (c) = Cosigned By    Initials Name Provider Type    Dara Connors, OT Occupational Therapist                      Time Calculation:   Timed Charges  64528 - OT Therapeutic Exercise Minutes: 15  96084 - OT Manual Therapy Minutes: 15  98146 - OT Self Care/Mgmt Minutes: 15  Total Minutes  Timed Charges Total Minutes: 45   Total Minutes: 45     Therapy Charges for Today     Code Description Service Date Service Provider Modifiers Qty    64389912268 HC OT THER PROC EA 15 MIN 11/16/2021 Dara Valdez, OT GO 1    44149897135 HC OT MANUAL THERAPY EA 15 MIN 11/16/2021 Dara Valdez, OT GO 1    08395165745 HC OT SELF CARE/MGMT/TRAIN EA 15 MIN 11/16/2021 Dara Valdez, OT GO 1                    Dara Valdez  OT  11/16/2021

## 2021-11-16 NOTE — TELEPHONE ENCOUNTER
Caller: RAMONA EARLY    Best call back number: [phone]    What orders are you requesting (i.e. lab or imaging): ORDERS FOR LYMPHEDEMA TREATMENT       PLEASE FAX TO: [phone]

## 2021-11-19 ENCOUNTER — DOCUMENTATION (OUTPATIENT)
Dept: OCCUPATIONAL THERAPY | Facility: HOSPITAL | Age: 61
End: 2021-11-19

## 2021-11-19 ENCOUNTER — HOSPITAL ENCOUNTER (OUTPATIENT)
Dept: OCCUPATIONAL THERAPY | Facility: HOSPITAL | Age: 61
Setting detail: THERAPIES SERIES
End: 2021-11-19

## 2021-11-19 ENCOUNTER — APPOINTMENT (OUTPATIENT)
Dept: ONCOLOGY | Facility: HOSPITAL | Age: 61
End: 2021-11-19

## 2021-11-19 DIAGNOSIS — Z91.89 AT RISK FOR LYMPHEDEMA: Primary | ICD-10-CM

## 2021-11-19 DIAGNOSIS — Z01.818 ENCOUNTER FOR PREOPERATIVE ASSESSMENT: ICD-10-CM

## 2021-11-19 DIAGNOSIS — Z17.0 MALIGNANT NEOPLASM OF LEFT BREAST IN FEMALE, ESTROGEN RECEPTOR POSITIVE, UNSPECIFIED SITE OF BREAST (HCC): ICD-10-CM

## 2021-11-19 DIAGNOSIS — C50.912 MALIGNANT NEOPLASM OF LEFT BREAST IN FEMALE, ESTROGEN RECEPTOR POSITIVE, UNSPECIFIED SITE OF BREAST (HCC): ICD-10-CM

## 2021-11-19 NOTE — THERAPY TREATMENT NOTE
Outpatient Occupational Therapy Lymphedema Treatment Note       Patient Name: Margy Jimenez  : 1960  MRN: [id number]  Today's Date: 2021      Visit Date: 2021    Patient Active Problem List   Diagnosis   • High blood pressure   • Mood disorder (HCC)   • Anxiety   • Biceps tendinitis, right   • Fatigue   • GERD (gastroesophageal reflux disease)   • Hyperlipidemia   • Migraine   • Nontraumatic incomplete tear of right rotator cuff   • Pain and swelling of right shoulder   • S/P rotator cuff repair   • Shoulder impingement, right   • Hypertension   • Depression   • Gastroesophageal reflux disease   • Heartburn   • Malignant neoplasm of left breast in female, estrogen receptor positive (HCC)        Past Medical History:   Diagnosis Date   • GERD (gastroesophageal reflux disease)    • High blood pressure    • Hyperlipidemia    • Mood disorder (HCC)    • Neoplasm of left breast         Past Surgical History:   Procedure Laterality Date   • ABDOMINAL HYSTERECTOMY     • BREAST BIOPSY Left 10/26/2021    Procedure: LEFT BREAST LUMPECTOMY WITH SENTINEL NODE BIOPSY AND NEEDLE LOCALIZATION;  Surgeon: Ayah Chaparro MD;  Location: [facility] MAIN OR;  Service: General;  Laterality: Left;   • BUNIONECTOMY     • COLONOSCOPY      2018    • COLONOSCOPY N/A 2021    Procedure: COLONOSCOPY WITH INJECTION ORISE/POLYPECTOMY HOT SNARE/ENDO CLIP X3;  Surgeon: David Hernandez MD;  Location: [facility] ENDOSCOPY;  Service: Gastroenterology;  Laterality: N/A;  COLON POLYP   • CYSTOSCOPY     • ENDOSCOPY     • ENDOSCOPY N/A 2021    Procedure: ESOPHAGOGASTRODUODENOSCOPY WITH BIOPSY;  Surgeon: David Hernandez MD;  Location: [facility] ENDOSCOPY;  Service: Gastroenterology;  Laterality: N/A;  GASTRIC POLYPS/REFLUX ESOPHAGITIS   • LAPAROSCOPIC CHOLECYSTECTOMY     • ROTATOR CUFF REPAIR Right          Visit Dx:      ICD-10-CM ICD-9-CM   1. At risk for lymphedema  Z91.89 V49.89   2. Malignant neoplasm  of left breast in female, estrogen receptor positive, unspecified site of breast (HCC)  C50.912 174.9    Z17.0 V86.0   3. Encounter for preoperative assessment  Z01.818 V72.84         Pt coming into appointment this date requesting therapist remeasure her UE secondary to pt feeling the measurements were incorrect at last treatment. Therapist remeasuring pt finding pt is at 1% increase in L UE when compared to R UE. Pt does not need treatment this date. Pt does state she dons post surgical compression bra daily and has had no issues. Therapist assisted pt in making appointment for 3 weeks post surgery.    Circumferential Measurements:                                              Time Calculation:                         INESSA Navarro  11/19/2021

## 2021-11-22 NOTE — TELEPHONE ENCOUNTER
Caller: Margy Jimenez    Relationship: Self    Best call back number: [phone]    What is the best time to reach you: ANYTIME    Who are you requesting to speak with (clinical staff, provider,  specific staff member): CLINICAL    Do you know the name of the person who called:     What was the call regarding: PT WILL BE COMPLETING A DOUBLE MASECTOMY. DOES SHE NEED TO KEEP APPT ON 11/19    Do you require a callback: YES  
Oncotype is still pending, but results expected by 11/29. We will keep the appointment for 12/3 to review results.  
PATIENT CALLED TODAY STATING THAT SHE HAD CHECKED HER MYCHART THIS MORNING TO DISCOVER THAT HER APPT HAD BEEN MOVED TO 12/3/21.  SHE HAD NOT BEEN IN FORMED OF THIS.      HOWEVER, PATIENT STATES THAT SHE WILL NOT BE HAVING BREAST SURGERY UNTIL December 7TH AND WOULD LIKE TO KNOW IF HER APPT SHOULD BE MOVED OUT UNTIL AFTER HER SURGERY.     IN ADDITION, SHE WOULD LIKE TO KNOW HOW LONG IT TAKES TO GET ONCO TYPE RESULTS BACK AND IF IT WOULD HAVE TO BE REDONE AFTER HER MASTECTOMY.      PLEASE CALL PATIENT TO ADVISE.  
Patient/Caregiver provided printed discharge information.

## 2021-12-01 ENCOUNTER — DOCUMENTATION (OUTPATIENT)
Dept: MAMMOGRAPHY | Facility: HOSPITAL | Age: 61
End: 2021-12-01

## 2021-12-01 NOTE — PROGRESS NOTES
CALLED [employer] TO CHECK ON PT'S ONCOTYPE RESULTS AND THEY REPORTED THAT THE INSURANCE TEAM IS LOOKING AT IT AT THIS TIME AND I COULD CALL BACK Friday TO SEE IF THEY APPROVED THE TEST.

## 2021-12-03 ENCOUNTER — TELEPHONE (OUTPATIENT)
Dept: ONCOLOGY | Facility: HOSPITAL | Age: 61
End: 2021-12-03

## 2021-12-03 ENCOUNTER — APPOINTMENT (OUTPATIENT)
Dept: ONCOLOGY | Facility: HOSPITAL | Age: 61
End: 2021-12-03

## 2021-12-07 ENCOUNTER — ANESTHESIA EVENT (OUTPATIENT)
Dept: PERIOP | Facility: HOSPITAL | Age: 61
End: 2021-12-07

## 2021-12-07 ENCOUNTER — ANESTHESIA (OUTPATIENT)
Dept: PERIOP | Facility: HOSPITAL | Age: 61
End: 2021-12-07

## 2021-12-07 ENCOUNTER — HOSPITAL ENCOUNTER (OUTPATIENT)
Facility: HOSPITAL | Age: 61
Discharge: HOME | End: 2021-12-08
Attending: SURGERY | Admitting: SURGERY

## 2021-12-07 DIAGNOSIS — C50.912 MALIGNANT NEOPLASM OF LEFT BREAST IN FEMALE, ESTROGEN RECEPTOR POSITIVE, UNSPECIFIED SITE OF BREAST (HCC): Primary | ICD-10-CM

## 2021-12-07 DIAGNOSIS — Z91.89 AT RISK FOR LYMPHEDEMA: ICD-10-CM

## 2021-12-07 DIAGNOSIS — Z17.0 MALIGNANT NEOPLASM OF LEFT BREAST IN FEMALE, ESTROGEN RECEPTOR POSITIVE, UNSPECIFIED SITE OF BREAST (HCC): Primary | ICD-10-CM

## 2021-12-07 DIAGNOSIS — Z91.89 AT RISK FOR SELF CARE DEFICIT: ICD-10-CM

## 2021-12-07 LAB — QT INTERVAL: 435 MS

## 2021-12-07 PROCEDURE — 15777 ACELLULAR DERM MATRIX IMPLT: CPT | Performed by: SURGERY

## 2021-12-07 PROCEDURE — 25010000002 PROPOFOL 10 MG/ML EMULSION: Performed by: NURSE ANESTHETIST, CERTIFIED REGISTERED

## 2021-12-07 PROCEDURE — 15860 IV NJX TST VASC FLO FLAP/GRF: CPT | Performed by: SURGERY

## 2021-12-07 PROCEDURE — 0 MEPERIDINE PER 100 MG: Performed by: NURSE ANESTHETIST, CERTIFIED REGISTERED

## 2021-12-07 PROCEDURE — C1781 MESH (IMPLANTABLE): HCPCS | Performed by: SURGERY

## 2021-12-07 PROCEDURE — 19340 INSJ BREAST IMPLT SM D MAST: CPT | Performed by: SURGERY

## 2021-12-07 PROCEDURE — 93005 ELECTROCARDIOGRAM TRACING: CPT | Performed by: STUDENT IN AN ORGANIZED HEALTH CARE EDUCATION/TRAINING PROGRAM

## 2021-12-07 PROCEDURE — 19303 MAST SIMPLE COMPLETE: CPT | Performed by: SURGERY

## 2021-12-07 PROCEDURE — 19303 MAST SIMPLE COMPLETE: CPT | Performed by: SPECIALIST/TECHNOLOGIST, OTHER

## 2021-12-07 PROCEDURE — 25010000002 FENTANYL CITRATE (PF) 50 MCG/ML SOLUTION: Performed by: NURSE ANESTHETIST, CERTIFIED REGISTERED

## 2021-12-07 PROCEDURE — 88307 TISSUE EXAM BY PATHOLOGIST: CPT | Performed by: SURGERY

## 2021-12-07 PROCEDURE — G0378 HOSPITAL OBSERVATION PER HR: HCPCS

## 2021-12-07 PROCEDURE — 25010000002 ONDANSETRON PER 1 MG: Performed by: NURSE ANESTHETIST, CERTIFIED REGISTERED

## 2021-12-07 PROCEDURE — C1789 PROSTHESIS, BREAST, IMP: HCPCS | Performed by: SURGERY

## 2021-12-07 PROCEDURE — 25010000002 MIDAZOLAM PER 1MG: Performed by: ANESTHESIOLOGY

## 2021-12-07 PROCEDURE — 25010000002 DEXAMETHASONE PER 1 MG: Performed by: NURSE ANESTHETIST, CERTIFIED REGISTERED

## 2021-12-07 PROCEDURE — 93010 ELECTROCARDIOGRAM REPORT: CPT | Performed by: INTERNAL MEDICINE

## 2021-12-07 DEVICE — IMPLANTABLE DEVICE: Type: IMPLANTABLE DEVICE | Site: BREAST | Status: FUNCTIONAL

## 2021-12-07 DEVICE — BRST SIL HSCPLS OPUSLUXE SMOTH RND HI/PROJ 700CC: Type: IMPLANTABLE DEVICE | Site: BREAST | Status: FUNCTIONAL

## 2021-12-07 DEVICE — LIGACLIP MCA MULTIPLE CLIP APPLIERS, 20 MEDIUM CLIPS
Type: IMPLANTABLE DEVICE | Site: BREAST | Status: FUNCTIONAL
Brand: LIGACLIP

## 2021-12-07 RX ORDER — CHOLECALCIFEROL (VITAMIN D3) 125 MCG
5 CAPSULE ORAL NIGHTLY PRN
Status: DISCONTINUED | OUTPATIENT
Start: 2021-12-07 | End: 2021-12-08 | Stop reason: HOSPADM

## 2021-12-07 RX ORDER — CLINDAMYCIN PHOSPHATE 900 MG/50ML
900 INJECTION INTRAVENOUS ONCE
Status: COMPLETED | OUTPATIENT
Start: 2021-12-07 | End: 2021-12-07

## 2021-12-07 RX ORDER — ACETAMINOPHEN 500 MG
1000 TABLET ORAL ONCE
Status: COMPLETED | OUTPATIENT
Start: 2021-12-07 | End: 2021-12-07

## 2021-12-07 RX ORDER — ONDANSETRON 2 MG/ML
INJECTION INTRAMUSCULAR; INTRAVENOUS AS NEEDED
Status: DISCONTINUED | OUTPATIENT
Start: 2021-12-07 | End: 2021-12-07 | Stop reason: SURG

## 2021-12-07 RX ORDER — CLINDAMYCIN HYDROCHLORIDE 150 MG/1
150 CAPSULE ORAL
COMMUNITY
End: 2022-04-12

## 2021-12-07 RX ORDER — BACLOFEN 10 MG/1
10 TABLET ORAL NIGHTLY
Status: DISCONTINUED | OUTPATIENT
Start: 2021-12-07 | End: 2021-12-08 | Stop reason: HOSPADM

## 2021-12-07 RX ORDER — CLINDAMYCIN HYDROCHLORIDE 150 MG/1
150 CAPSULE ORAL
Status: DISCONTINUED | OUTPATIENT
Start: 2021-12-07 | End: 2021-12-07 | Stop reason: SDUPTHER

## 2021-12-07 RX ORDER — SODIUM CHLORIDE, SODIUM LACTATE, POTASSIUM CHLORIDE, CALCIUM CHLORIDE 600; 310; 30; 20 MG/100ML; MG/100ML; MG/100ML; MG/100ML
9 INJECTION, SOLUTION INTRAVENOUS CONTINUOUS PRN
Status: DISCONTINUED | OUTPATIENT
Start: 2021-12-07 | End: 2021-12-08 | Stop reason: HOSPADM

## 2021-12-07 RX ORDER — MORPHINE SULFATE 2 MG/ML
2 INJECTION, SOLUTION INTRAMUSCULAR; INTRAVENOUS
Status: DISCONTINUED | OUTPATIENT
Start: 2021-12-07 | End: 2021-12-08 | Stop reason: HOSPADM

## 2021-12-07 RX ORDER — ONDANSETRON 2 MG/ML
4 INJECTION INTRAMUSCULAR; INTRAVENOUS ONCE AS NEEDED
Status: DISCONTINUED | OUTPATIENT
Start: 2021-12-07 | End: 2021-12-07 | Stop reason: HOSPADM

## 2021-12-07 RX ORDER — PANTOPRAZOLE SODIUM 40 MG/1
40 TABLET, DELAYED RELEASE ORAL 2 TIMES DAILY
Status: DISCONTINUED | OUTPATIENT
Start: 2021-12-07 | End: 2021-12-08 | Stop reason: HOSPADM

## 2021-12-07 RX ORDER — LIDOCAINE HYDROCHLORIDE 20 MG/ML
INJECTION, SOLUTION INFILTRATION; PERINEURAL AS NEEDED
Status: DISCONTINUED | OUTPATIENT
Start: 2021-12-07 | End: 2021-12-07 | Stop reason: SURG

## 2021-12-07 RX ORDER — PROMETHAZINE HYDROCHLORIDE 12.5 MG/1
12.5 TABLET ORAL EVERY 6 HOURS PRN
Status: DISCONTINUED | OUTPATIENT
Start: 2021-12-07 | End: 2021-12-08 | Stop reason: HOSPADM

## 2021-12-07 RX ORDER — PROMETHAZINE HYDROCHLORIDE 25 MG/1
25 SUPPOSITORY RECTAL ONCE AS NEEDED
Status: DISCONTINUED | OUTPATIENT
Start: 2021-12-07 | End: 2021-12-07 | Stop reason: HOSPADM

## 2021-12-07 RX ORDER — CLINDAMYCIN PHOSPHATE 600 MG/50ML
600 INJECTION INTRAVENOUS EVERY 8 HOURS
Status: COMPLETED | OUTPATIENT
Start: 2021-12-07 | End: 2021-12-08

## 2021-12-07 RX ORDER — MIRTAZAPINE 15 MG/1
15 TABLET, FILM COATED ORAL NIGHTLY
Status: DISCONTINUED | OUTPATIENT
Start: 2021-12-07 | End: 2021-12-08 | Stop reason: HOSPADM

## 2021-12-07 RX ORDER — PROPOFOL 10 MG/ML
VIAL (ML) INTRAVENOUS AS NEEDED
Status: DISCONTINUED | OUTPATIENT
Start: 2021-12-07 | End: 2021-12-07 | Stop reason: SURG

## 2021-12-07 RX ORDER — EPHEDRINE SULFATE 50 MG/ML
INJECTION, SOLUTION INTRAVENOUS AS NEEDED
Status: DISCONTINUED | OUTPATIENT
Start: 2021-12-07 | End: 2021-12-07 | Stop reason: SURG

## 2021-12-07 RX ORDER — FENTANYL CITRATE 50 UG/ML
INJECTION, SOLUTION INTRAMUSCULAR; INTRAVENOUS AS NEEDED
Status: DISCONTINUED | OUTPATIENT
Start: 2021-12-07 | End: 2021-12-07 | Stop reason: SURG

## 2021-12-07 RX ORDER — CELECOXIB 100 MG/1
200 CAPSULE ORAL 2 TIMES DAILY
Status: DISCONTINUED | OUTPATIENT
Start: 2021-12-07 | End: 2021-12-07

## 2021-12-07 RX ORDER — DEXAMETHASONE SODIUM PHOSPHATE 4 MG/ML
INJECTION, SOLUTION INTRA-ARTICULAR; INTRALESIONAL; INTRAMUSCULAR; INTRAVENOUS; SOFT TISSUE AS NEEDED
Status: DISCONTINUED | OUTPATIENT
Start: 2021-12-07 | End: 2021-12-07 | Stop reason: SURG

## 2021-12-07 RX ORDER — BUSPIRONE HYDROCHLORIDE 10 MG/1
10 TABLET ORAL NIGHTLY
Status: DISCONTINUED | OUTPATIENT
Start: 2021-12-07 | End: 2021-12-08 | Stop reason: HOSPADM

## 2021-12-07 RX ORDER — PROMETHAZINE HYDROCHLORIDE 12.5 MG/1
25 TABLET ORAL EVERY 6 HOURS PRN
Status: DISCONTINUED | OUTPATIENT
Start: 2021-12-07 | End: 2021-12-07

## 2021-12-07 RX ORDER — AMLODIPINE BESYLATE 2.5 MG/1
2.5 TABLET ORAL NIGHTLY
Status: DISCONTINUED | OUTPATIENT
Start: 2021-12-07 | End: 2021-12-08 | Stop reason: HOSPADM

## 2021-12-07 RX ORDER — DEXMEDETOMIDINE HYDROCHLORIDE 100 UG/ML
INJECTION, SOLUTION INTRAVENOUS AS NEEDED
Status: DISCONTINUED | OUTPATIENT
Start: 2021-12-07 | End: 2021-12-07 | Stop reason: SURG

## 2021-12-07 RX ORDER — PROMETHAZINE HYDROCHLORIDE 12.5 MG/1
25 TABLET ORAL ONCE AS NEEDED
Status: DISCONTINUED | OUTPATIENT
Start: 2021-12-07 | End: 2021-12-07 | Stop reason: HOSPADM

## 2021-12-07 RX ORDER — ESCITALOPRAM OXALATE 10 MG/1
20 TABLET ORAL NIGHTLY
Status: DISCONTINUED | OUTPATIENT
Start: 2021-12-07 | End: 2021-12-08 | Stop reason: HOSPADM

## 2021-12-07 RX ORDER — CELECOXIB 100 MG/1
200 CAPSULE ORAL 2 TIMES DAILY
Status: DISCONTINUED | OUTPATIENT
Start: 2021-12-07 | End: 2021-12-08 | Stop reason: HOSPADM

## 2021-12-07 RX ORDER — ROCURONIUM BROMIDE 10 MG/ML
INJECTION, SOLUTION INTRAVENOUS AS NEEDED
Status: DISCONTINUED | OUTPATIENT
Start: 2021-12-07 | End: 2021-12-07 | Stop reason: SURG

## 2021-12-07 RX ORDER — MEPERIDINE HYDROCHLORIDE 25 MG/ML
12.5 INJECTION INTRAMUSCULAR; INTRAVENOUS; SUBCUTANEOUS
Status: DISCONTINUED | OUTPATIENT
Start: 2021-12-07 | End: 2021-12-07 | Stop reason: HOSPADM

## 2021-12-07 RX ORDER — ACETAMINOPHEN 650 MG/1
650 SUPPOSITORY RECTAL EVERY 4 HOURS PRN
Status: DISCONTINUED | OUTPATIENT
Start: 2021-12-07 | End: 2021-12-08 | Stop reason: HOSPADM

## 2021-12-07 RX ORDER — ATORVASTATIN CALCIUM 10 MG/1
10 TABLET, FILM COATED ORAL DAILY
Status: DISCONTINUED | OUTPATIENT
Start: 2021-12-07 | End: 2021-12-07

## 2021-12-07 RX ORDER — HYDROCHLOROTHIAZIDE 12.5 MG/1
12.5 CAPSULE, GELATIN COATED ORAL DAILY
Status: DISCONTINUED | OUTPATIENT
Start: 2021-12-07 | End: 2021-12-08 | Stop reason: HOSPADM

## 2021-12-07 RX ORDER — LISINOPRIL 20 MG/1
20 TABLET ORAL DAILY
Status: DISCONTINUED | OUTPATIENT
Start: 2021-12-07 | End: 2021-12-08 | Stop reason: HOSPADM

## 2021-12-07 RX ORDER — NALOXONE HCL 0.4 MG/ML
0.1 VIAL (ML) INJECTION
Status: CANCELLED | OUTPATIENT
Start: 2021-12-07

## 2021-12-07 RX ORDER — GLYCOPYRROLATE 0.2 MG/ML
0.2 INJECTION INTRAMUSCULAR; INTRAVENOUS
Status: DISCONTINUED | OUTPATIENT
Start: 2021-12-07 | End: 2021-12-07

## 2021-12-07 RX ORDER — PHENYLEPHRINE HCL IN 0.9% NACL 1 MG/10 ML
SYRINGE (ML) INTRAVENOUS AS NEEDED
Status: DISCONTINUED | OUTPATIENT
Start: 2021-12-07 | End: 2021-12-07 | Stop reason: SURG

## 2021-12-07 RX ORDER — LISINOPRIL 10 MG/1
10 TABLET ORAL DAILY
Status: DISCONTINUED | OUTPATIENT
Start: 2021-12-07 | End: 2021-12-07

## 2021-12-07 RX ORDER — OXYCODONE HYDROCHLORIDE AND ACETAMINOPHEN 5; 325 MG/1; MG/1
1 TABLET ORAL EVERY 4 HOURS PRN
Status: DISCONTINUED | OUTPATIENT
Start: 2021-12-07 | End: 2021-12-08 | Stop reason: HOSPADM

## 2021-12-07 RX ORDER — ATORVASTATIN CALCIUM 10 MG/1
10 TABLET, FILM COATED ORAL NIGHTLY
Status: DISCONTINUED | OUTPATIENT
Start: 2021-12-07 | End: 2021-12-08 | Stop reason: HOSPADM

## 2021-12-07 RX ORDER — AMOXICILLIN 250 MG
2 CAPSULE ORAL 2 TIMES DAILY PRN
Status: DISCONTINUED | OUTPATIENT
Start: 2021-12-07 | End: 2021-12-08 | Stop reason: HOSPADM

## 2021-12-07 RX ORDER — HYDROCHLOROTHIAZIDE 12.5 MG/1
12.5 CAPSULE, GELATIN COATED ORAL DAILY
Status: DISCONTINUED | OUTPATIENT
Start: 2021-12-07 | End: 2021-12-07

## 2021-12-07 RX ORDER — MORPHINE SULFATE 2 MG/ML
2 INJECTION, SOLUTION INTRAMUSCULAR; INTRAVENOUS
Status: DISCONTINUED | OUTPATIENT
Start: 2021-12-07 | End: 2021-12-07

## 2021-12-07 RX ORDER — HYDROMORPHONE HCL 110MG/55ML
0.5 PATIENT CONTROLLED ANALGESIA SYRINGE INTRAVENOUS
Status: CANCELLED | OUTPATIENT
Start: 2021-12-07 | End: 2021-12-14

## 2021-12-07 RX ORDER — TAMSULOSIN HYDROCHLORIDE 0.4 MG/1
0.4 CAPSULE ORAL NIGHTLY
Status: DISCONTINUED | OUTPATIENT
Start: 2021-12-07 | End: 2021-12-08 | Stop reason: HOSPADM

## 2021-12-07 RX ORDER — OXYCODONE HYDROCHLORIDE 5 MG/1
5 TABLET ORAL
Status: DISCONTINUED | OUTPATIENT
Start: 2021-12-07 | End: 2021-12-07 | Stop reason: HOSPADM

## 2021-12-07 RX ORDER — MIDAZOLAM HYDROCHLORIDE 2 MG/2ML
2 INJECTION, SOLUTION INTRAMUSCULAR; INTRAVENOUS ONCE
Status: COMPLETED | OUTPATIENT
Start: 2021-12-07 | End: 2021-12-07

## 2021-12-07 RX ORDER — NAPROXEN 250 MG/1
250 TABLET ORAL 2 TIMES DAILY WITH MEALS
Status: DISCONTINUED | OUTPATIENT
Start: 2021-12-07 | End: 2021-12-07

## 2021-12-07 RX ORDER — KETAMINE HYDROCHLORIDE 50 MG/ML
INJECTION, SOLUTION, CONCENTRATE INTRAMUSCULAR; INTRAVENOUS AS NEEDED
Status: DISCONTINUED | OUTPATIENT
Start: 2021-12-07 | End: 2021-12-07 | Stop reason: SURG

## 2021-12-07 RX ORDER — HYDROCODONE BITARTRATE AND ACETAMINOPHEN 5; 325 MG/1; MG/1
2 TABLET ORAL EVERY 6 HOURS PRN
Status: DISCONTINUED | OUTPATIENT
Start: 2021-12-07 | End: 2021-12-07

## 2021-12-07 RX ORDER — ACETAMINOPHEN 325 MG/1
650 TABLET ORAL EVERY 4 HOURS PRN
Status: DISCONTINUED | OUTPATIENT
Start: 2021-12-07 | End: 2021-12-08 | Stop reason: HOSPADM

## 2021-12-07 RX ADMIN — Medication 100 MCG: at 09:41

## 2021-12-07 RX ADMIN — CLINDAMYCIN PHOSPHATE 900 MG: 900 INJECTION, SOLUTION INTRAVENOUS at 08:30

## 2021-12-07 RX ADMIN — PROPOFOL 150 MG: 10 INJECTION, EMULSION INTRAVENOUS at 08:39

## 2021-12-07 RX ADMIN — ROCURONIUM BROMIDE 10 MG: 10 INJECTION INTRAVENOUS at 09:08

## 2021-12-07 RX ADMIN — MEPERIDINE HYDROCHLORIDE 12.5 MG: 25 INJECTION INTRAMUSCULAR; INTRAVENOUS; SUBCUTANEOUS at 12:41

## 2021-12-07 RX ADMIN — ROCURONIUM BROMIDE 10 MG: 10 INJECTION INTRAVENOUS at 10:49

## 2021-12-07 RX ADMIN — ATORVASTATIN CALCIUM 10 MG: 10 TABLET, FILM COATED ORAL at 19:42

## 2021-12-07 RX ADMIN — LISINOPRIL 20 MG: 20 TABLET ORAL at 19:42

## 2021-12-07 RX ADMIN — FENTANYL CITRATE 100 MCG: 50 INJECTION, SOLUTION INTRAMUSCULAR; INTRAVENOUS at 11:13

## 2021-12-07 RX ADMIN — OXYCODONE HYDROCHLORIDE 5 MG: 5 TABLET ORAL at 12:46

## 2021-12-07 RX ADMIN — DEXMEDETOMIDINE HYDROCHLORIDE 50 MCG: 100 INJECTION, SOLUTION, CONCENTRATE INTRAVENOUS at 08:39

## 2021-12-07 RX ADMIN — PROPOFOL 150 MCG/KG/MIN: 10 INJECTION, EMULSION INTRAVENOUS at 08:39

## 2021-12-07 RX ADMIN — DEXAMETHASONE SODIUM PHOSPHATE 4 MG: 4 INJECTION INTRA-ARTICULAR; INTRALESIONAL; INTRAMUSCULAR; INTRAVENOUS; SOFT TISSUE at 09:08

## 2021-12-07 RX ADMIN — SUGAMMADEX 200 MG: 100 INJECTION, SOLUTION INTRAVENOUS at 12:03

## 2021-12-07 RX ADMIN — KETAMINE HYDROCHLORIDE 25 MG: 50 INJECTION, SOLUTION INTRAMUSCULAR; INTRAVENOUS at 08:39

## 2021-12-07 RX ADMIN — FENTANYL CITRATE 100 MCG: 50 INJECTION, SOLUTION INTRAMUSCULAR; INTRAVENOUS at 10:17

## 2021-12-07 RX ADMIN — ACETAMINOPHEN 1000 MG: 500 TABLET ORAL at 07:06

## 2021-12-07 RX ADMIN — CLINDAMYCIN IN 5 PERCENT DEXTROSE 600 MG: 12 INJECTION, SOLUTION INTRAVENOUS at 17:08

## 2021-12-07 RX ADMIN — HYDROCHLOROTHIAZIDE 12.5 MG: 12.5 CAPSULE ORAL at 19:40

## 2021-12-07 RX ADMIN — BUSPIRONE HYDROCHLORIDE 10 MG: 10 TABLET ORAL at 19:41

## 2021-12-07 RX ADMIN — BACLOFEN 10 MG: 10 TABLET ORAL at 20:17

## 2021-12-07 RX ADMIN — SODIUM CHLORIDE, POTASSIUM CHLORIDE, SODIUM LACTATE AND CALCIUM CHLORIDE 9 ML/HR: 600; 310; 30; 20 INJECTION, SOLUTION INTRAVENOUS at 07:07

## 2021-12-07 RX ADMIN — ROCURONIUM BROMIDE 20 MG: 10 INJECTION INTRAVENOUS at 09:05

## 2021-12-07 RX ADMIN — TAMSULOSIN HYDROCHLORIDE 0.4 MG: 0.4 CAPSULE ORAL at 20:16

## 2021-12-07 RX ADMIN — ROCURONIUM BROMIDE 30 MG: 10 INJECTION INTRAVENOUS at 10:09

## 2021-12-07 RX ADMIN — EPHEDRINE SULFATE 10 MG: 50 INJECTION INTRAVENOUS at 09:48

## 2021-12-07 RX ADMIN — FENTANYL CITRATE 50 MCG: 50 INJECTION, SOLUTION INTRAMUSCULAR; INTRAVENOUS at 09:08

## 2021-12-07 RX ADMIN — ROCURONIUM BROMIDE 10 MG: 10 INJECTION INTRAVENOUS at 11:18

## 2021-12-07 RX ADMIN — ONDANSETRON 4 MG: 2 INJECTION INTRAMUSCULAR; INTRAVENOUS at 09:08

## 2021-12-07 RX ADMIN — ROCURONIUM BROMIDE 50 MG: 10 INJECTION INTRAVENOUS at 08:39

## 2021-12-07 RX ADMIN — KETAMINE HYDROCHLORIDE 25 MG: 50 INJECTION, SOLUTION INTRAMUSCULAR; INTRAVENOUS at 09:08

## 2021-12-07 RX ADMIN — ROCURONIUM BROMIDE 20 MG: 10 INJECTION INTRAVENOUS at 08:56

## 2021-12-07 RX ADMIN — PANTOPRAZOLE SODIUM 40 MG: 40 TABLET, DELAYED RELEASE ORAL at 19:41

## 2021-12-07 RX ADMIN — MIRTAZAPINE 15 MG: 15 TABLET, FILM COATED ORAL at 19:41

## 2021-12-07 RX ADMIN — ONDANSETRON 4 MG: 2 INJECTION INTRAMUSCULAR; INTRAVENOUS at 12:43

## 2021-12-07 RX ADMIN — OXYCODONE HYDROCHLORIDE AND ACETAMINOPHEN 1 TABLET: 5; 325 TABLET ORAL at 15:29

## 2021-12-07 RX ADMIN — AMLODIPINE BESYLATE 2.5 MG: 2.5 TABLET ORAL at 20:17

## 2021-12-07 RX ADMIN — LIDOCAINE HYDROCHLORIDE 50 MG: 20 INJECTION, SOLUTION INFILTRATION; PERINEURAL at 08:39

## 2021-12-07 RX ADMIN — FENTANYL CITRATE 50 MCG: 50 INJECTION, SOLUTION INTRAMUSCULAR; INTRAVENOUS at 08:39

## 2021-12-07 RX ADMIN — OXYCODONE HYDROCHLORIDE AND ACETAMINOPHEN 1 TABLET: 5; 325 TABLET ORAL at 19:40

## 2021-12-07 RX ADMIN — ESCITALOPRAM OXALATE 20 MG: 10 TABLET ORAL at 19:41

## 2021-12-07 RX ADMIN — CELECOXIB 200 MG: 100 CAPSULE ORAL at 19:42

## 2021-12-07 RX ADMIN — MIDAZOLAM HYDROCHLORIDE 2 MG: 1 INJECTION, SOLUTION INTRAMUSCULAR; INTRAVENOUS at 08:28

## 2021-12-07 NOTE — OP NOTE
BREAST MASTECTOMY BILATERAL  Procedure Report    Patient Name:  Margy Jimenez  YOB: 1960    Date of Surgery:  12/7/2021     Indications:  Breast cancer    Pre-op Diagnosis:   Malignant neoplasm of left breast in female, estrogen receptor positive, unspecified site of breast (HCC) [C50.912, Z17.0]       Post-Op Diagnosis Codes:     * Malignant neoplasm of left breast in female, estrogen receptor positive, unspecified site of breast (HCC) [C50.912, Z17.0]    Procedure/CPT® Codes:      Procedure(s):  Bilateral mastectomy skin sparing    Staff:  Surgeon(s):  Ayah Chaparro MD Fensterer, Tathyana M, MD    Assistant: Diane Delacruz; Holley Abdullahi CSA    Anesthesia: General    Estimated Blood Loss: 200ml    Implants:    Implant Name Type Inv. Item Serial No.  Lot No. LRB No. Used Action   CLIPAPPLR M/ ENDO LIGACLIP 20CLP 11IN MD - [id number] Implant CLIPAPPLR M/ ENDO LIGACLIP 20CLP 11IN MD  [facility]  DIV OF J [employer] J [id number]  3 Implanted       Specimen:          Specimens     ID Source Type Tests Collected By Collected At Frozen?    A Breast, Right Tissue · TISSUE PATHOLOGY EXAM   Ayah Chaparro MD 12/7/21 0918     Description: SHORT STITCH SUPERIOR - LONG STITCH LATERAL     This specimen was not marked as sent.    B Breast, Left Tissue · TISSUE PATHOLOGY EXAM   Ayah Chaparro MD 12/7/21 0939     Description: SHORT STITCH SUPERIOR - LONG STITCH LATERAL     This specimen was not marked as sent.              Findings: none    Complications: none    Description of Procedure:    She was then brought back to the OR and placed supine on the table and   We moved to the breast portion of the procedure. An elliptical incision was made around the nipple-areolar complex and a skin sparing mastectomy was completed by removing the breast by raising skin flaps superiorly to the clavicle, medially to the sternum, out laterally to the muscle border, and inferiorly to  the inframammary crease. The breast was taken off the chest wall, including the prepectoral fascia, and it was marked short stitch superior and long stitch lateral and sent down to Pathology. The wound was copiously irrigated with sterile water and bacitracin. Pathology did call back and state the nodes were negative by touch prep. So Dr Pires began her portion of the procedure. We moved to the left breast and an elliptical incision was made around the nipple-areolar complex and a skin sparing mastectomy was completed by removing the breast by raising skin flaps superiorly to the clavicle, medially to the sternum, out laterally to the muscle border, and inferiorly to the inframammary crease. The breast was taken off the chest wall, including the prepectoral fascia, and it was marked short stitch superior and long stitch lateral and sent down to Pathology. The wound was copiously irrigated with sterile water. The closure on this side was done by Dr Pires as well and is dictated separately.  Assistant: Diane Delacruz Dianna, CSA  was responsible for performing the following activities: Retraction and Suction and their skilled assistance was necessary for the success of this case.    Ayah Chaparro MD     Date: 12/7/2021  Time: 09:53 EST

## 2021-12-07 NOTE — OP NOTE
Plastic Surgery Op Note    IMMEDIATE BILATERAL BREAST RECONSTRUCTION WITH PRE PEC PLACEMENT OF IMPLANT AND MESH, USE OF SPY, CHEST WALL TAILORING WITH LIPOSUCTION AND SKIN RESECTION.     Margy OTTO Barbara  12/7/2021    Pre-op Diagnosis:   Malignant neoplasm of left breast in female, estrogen receptor positive, unspecified site of breast (HCC) [C50.912, Z17.0]    Post-Op Diagnosis Codes:     * Malignant neoplasm of left breast in female, estrogen receptor positive, unspecified site of breast (HCC) [C50.912, Z17.0]    Anesthesia: General    Staff:   Circulator: Mariangel Benavidez RN  Scrub Person: Dreick Cordoba; Adelia Doran RN  Assistant: Diane Delacruz; Holley Abdullahi CSA    Surgeon: Dr Pires    Estimated Blood Loss: 200 mL    Specimens:   Order Name Source Comment Collection Info Order Time   TISSUE PATHOLOGY EXAM Breast, Right  Collected By: Ayah Chaparro MD 12/7/2021  9:18 AM     Release to patient   Immediate              Drains: * No LDAs found *    Findings:     Implants:      Right:   Large galaflex  Implant: sientra high projection 700 cc  Left:  Large galaflex  Implant: sientra high projection 700 cc    Complications: none      Date: 12/7/2021  Time: 12:48 EST  After risks and benefits were discussed with patient and inform consent was signed, patient was taken to the OR and positioned supine. The surgical site was then prepped in a sterile fashion way and a time out was performed confirming patient's name and procedure to be performed. All surgical team was introduced by name.   The planned footprint of the reconstructed breast was marked on the chest wall. The location of the inframammary suture line was marked approximately 0.5 cm below that of the planned inframammary fold location on the reconstructed breast. A galaflex was placed in the breast pocket with 3-0 vicryl and a 700 cc implant was placed. The skin was placed over the implant and the skin was draped over it. Spy was  used to evaluate the viability of the flaps that were very good. Then I infused tumescent on the lateral aspect of the chest and liposuction was performed. Then, the skin was tailored , a drain was placed and the skin was closed with 2 layers of  3-0 vicryl. A mirror procedure was performed in the contralateral side.     Patient tolerated procedure well, there were no complications, all instruments were checked and patient was awaken and taken to PACU in stable condition.  I was present for the entire procedure.   Neena Pires MD  12/07/21  12:48 EST

## 2021-12-07 NOTE — ANESTHESIA POSTPROCEDURE EVALUATION
Patient: Margy Jimenez    Procedure Summary     Date: 12/07/21 Room / Location: [facility] OR [address] [place] [address] OR [facility]SC    Anesthesia Start: 0830 Anesthesia Stop: 1222    Procedures:       MASTECTOMY (Bilateral Breast)      BILATERAL BREAST RECONSTRUCTION WITH IMPLANTS, IMPLANT OF BIOLOGICAL MESH,  USE OF SPY, AND BILATERAL CHEST LIPOSUCTION (Bilateral Breast) Diagnosis:       Malignant neoplasm of left breast in female, estrogen receptor positive, unspecified site of breast (HCC)      (Malignant neoplasm of left breast in female, estrogen receptor positive, unspecified site of breast (HCC) [C50.912, Z17.0])    Surgeons: Ayah Chaparro MD; Neena Pires MD Provider: Herman Aguero MD    Anesthesia Type: general ASA Status: 3          Anesthesia Type: general    Vitals  Vitals Value Taken Time   /70 12/07/21 1250   Temp 36.7 °C (98 °F) 12/07/21 1220   Pulse 75 12/07/21 1257   Resp 12 12/07/21 1220   SpO2 93 % 12/07/21 1257   Vitals shown include unvalidated device data.        Post Anesthesia Care and Evaluation    Patient location during evaluation: bedside  Patient participation: complete - patient participated  Level of consciousness: awake  Pain management: adequate  Airway patency: patent  Anesthetic complications: No anesthetic complications  PONV Status: none  Cardiovascular status: acceptable and stable  Respiratory status: acceptable  Hydration status: acceptable    Comments: An Anesthesiologist personally participated in the most demanding procedures (including induction and emergence if applicable) in the anesthesia plan, monitored the course of anesthesia administration at frequent intervals and remained physically present and available for immediate diagnosis and treatment of emergencies.

## 2021-12-07 NOTE — H&P
Plastic  Surgery H&P    Patient Identification:  Name: Margy Jimenez  Age: 61 y.o.  Sex: female  :  1960  MRN: [id number]                       Chief Complaint:  Left breast cancer    History of Present Illness:   62 yo lady with left breast cancer scheduled for atif skin sparing mastectomies and reconstruction    Past Medical History:   Diagnosis Date   • Cancer (HCC)     BREAST   • GERD (gastroesophageal reflux disease)    • High blood pressure    • Hyperlipidemia    • Mood disorder (HCC)    • Neoplasm of left breast        Past Surgical History:   Procedure Laterality Date   • ABDOMINAL HYSTERECTOMY     • BREAST BIOPSY Left 10/26/2021    Procedure: LEFT BREAST LUMPECTOMY WITH SENTINEL NODE BIOPSY AND NEEDLE LOCALIZATION;  Surgeon: Ayah Chaparro MD;  Location: [facility] MAIN OR;  Service: General;  Laterality: Left;   • BUNIONECTOMY     • COLONOSCOPY      2018    • COLONOSCOPY N/A 2021    Procedure: COLONOSCOPY WITH INJECTION ORISE/POLYPECTOMY HOT SNARE/ENDO CLIP X3;  Surgeon: David Hernandez MD;  Location: [facility] ENDOSCOPY;  Service: Gastroenterology;  Laterality: N/A;  COLON POLYP   • CYSTOSCOPY     • ENDOSCOPY     • ENDOSCOPY N/A 2021    Procedure: ESOPHAGOGASTRODUODENOSCOPY WITH BIOPSY;  Surgeon: David Hernandez MD;  Location: [facility] ENDOSCOPY;  Service: Gastroenterology;  Laterality: N/A;  GASTRIC POLYPS/REFLUX ESOPHAGITIS   • LAPAROSCOPIC CHOLECYSTECTOMY     • ROTATOR CUFF REPAIR Right         Medications Prior to Admission   Medication Sig Dispense Refill Last Dose   • amLODIPine (NORVASC) 2.5 MG tablet Take 2.5 mg by mouth Every Night.   2021 at Unknown time   • atorvastatin (LIPITOR) 10 MG tablet Take 10 mg by mouth every night at bedtime.   2021 at Unknown time   • baclofen (LIORESAL) 10 MG tablet Take 10 mg by mouth Every Night.   2021 at Unknown time   • busPIRone (BUSPAR) 10 MG tablet Take 10 mg by mouth Every Night.   2021 at  Unknown time   • celecoxib (CeleBREX) 200 MG capsule Take 1 capsule by mouth 2 (Two) Times a Day. (Patient taking differently: Take 200 mg by mouth 2 (Two) Times a Day. PER PT PRESCRIBED POSTOP AFTER SURGERY PER SURGEON) 24 capsule 0 12/7/2021 at Unknown time   • clindamycin (CLEOCIN) 150 MG capsule Take 150 mg by mouth Daily Before Lunch. INSTRUCTED TO TAKE MEDICINE 2 HOURS PRIOR TO SURGERY   12/7/2021 at Unknown time   • diclofenac (VOLTAREN) 75 MG EC tablet Take 75 mg by mouth Daily.   11/18/2021 at Unknown time   • escitalopram (LEXAPRO) 20 MG tablet Take 20 mg by mouth Every Night.   11/17/2021 at Unknown time   • lisinopril-hydrochlorothiazide (PRINZIDE,ZESTORETIC) 20-12.5 MG per tablet Take 1 tablet by mouth Every Night.   11/18/2021 at Unknown time   • mirtazapine (REMERON) 15 MG tablet Take 15 mg by mouth every night at bedtime.   11/17/2021 at Unknown time   • pantoprazole (PROTONIX) 40 MG EC tablet Take 1 tablet by mouth Daily. (Patient taking differently: Take 40 mg by mouth 2 (Two) Times a Day.) 30 tablet 5 12/7/2021 at Unknown time   • tamsulosin (FLOMAX) 0.4 MG capsule 24 hr capsule Take 1 capsule by mouth Every Night.   11/17/2021 at Unknown time   • promethazine (PHENERGAN) 25 MG tablet Take 1 tablet by mouth Every 6 (Six) Hours As Needed for Nausea or Vomiting. 10 tablet 0        Allergies   Allergen Reactions   • Hydromorphone Anaphylaxis   • Hydromorphone Hcl Anaphylaxis and Palpitations   • Cephalexin Rash       Social History     Tobacco Use   • Smoking status: Never Smoker   • Smokeless tobacco: Never Used   Substance Use Topics   • Alcohol use: Yes     Alcohol/week: 1.0 standard drink     Types: 1 Glasses of wine per week     Comment: OCC        Family History   Problem Relation Age of Onset   • Diabetes Mother    • Diabetes Other    • Colon cancer Neg Hx    • Malig Hyperthermia Neg Hx         Review of Systems  All system were reviewed and were negative, except the ones noted above.  "    Objective:  Blood pressure 120/93, pulse 88, temperature 97.3 °F (36.3 °C), temperature source Temporal, resp. rate 20, height 160 cm (63\"), weight 95.8 kg (211 lb 3.2 oz), SpO2 95 %, not currently breastfeeding.    Exam:  General: alert, cooperative and no distress  Head: normocephalic, without obvious abnormality, atraumatic  Eyes: EOMI, no icterus  Neck: no JVD and supple, symmetrical, trachea midline  Lungs: clear to auscultation bilaterally  Heart: normal rate, regular rhythm   Abdomen: soft, non-distended, non-tender, no peritoneal signs, no masses  Extremities: normal, atraumatic, no cyanosis or edema  Skin: color, texture, turgor normal, no rashes or lesions    All labs (24hrs):   Recent Results (from the past 24 hour(s))   ECG 12 Lead    Collection Time: 12/07/21  6:51 AM   Result Value Ref Range    QT Interval 435 ms            Assessment:  60 yo lady with left breast cancer    Plan:  OR today for atif skin sparing mastectomies and immediate reconstruction    Neena Pires MD    12/7/2021      "

## 2021-12-07 NOTE — ANESTHESIA PREPROCEDURE EVALUATION
Anesthesia Evaluation                  Airway   Mallampati: II  TM distance: >3 FB  Neck ROM: full  No difficulty expected  Dental      Pulmonary - normal exam    breath sounds clear to auscultation  Cardiovascular - normal exam    Rhythm: regular    (+) hypertension, hyperlipidemia,       Neuro/Psych  (+) headaches, psychiatric history Anxiety and Depression,     GI/Hepatic/Renal/Endo    (+)  GERD,      Musculoskeletal     (+) joint swelling,   Abdominal    Substance History      OB/GYN          Other      history of cancer                    Anesthesia Plan    ASA 3     general       Anesthetic plan, all risks, benefits, and alternatives have been provided, discussed and informed consent has been obtained with: patient and child.

## 2021-12-08 VITALS
BODY MASS INDEX: 37.42 KG/M2 | WEIGHT: 211.2 LBS | HEIGHT: 63 IN | DIASTOLIC BLOOD PRESSURE: 58 MMHG | HEART RATE: 62 BPM | OXYGEN SATURATION: 98 % | TEMPERATURE: 97.34 F | SYSTOLIC BLOOD PRESSURE: 137 MMHG | RESPIRATION RATE: 16 BRPM

## 2021-12-08 PROBLEM — Z90.13 S/P BILATERAL MASTECTOMY: Status: ACTIVE | Noted: 2021-12-08

## 2021-12-08 PROBLEM — Z98.890 STATUS POST BILATERAL BREAST RECONSTRUCTION: Status: ACTIVE | Noted: 2021-12-08

## 2021-12-08 PROCEDURE — 97530 THERAPEUTIC ACTIVITIES: CPT

## 2021-12-08 PROCEDURE — 99024 POSTOP FOLLOW-UP VISIT: CPT | Performed by: SURGERY

## 2021-12-08 PROCEDURE — L8015 EXT BREASTPROSTHESIS GARMENT: HCPCS

## 2021-12-08 PROCEDURE — 25010000002 MORPHINE PER 10 MG: Performed by: SURGERY

## 2021-12-08 PROCEDURE — 97165 OT EVAL LOW COMPLEX 30 MIN: CPT

## 2021-12-08 PROCEDURE — G0378 HOSPITAL OBSERVATION PER HR: HCPCS

## 2021-12-08 PROCEDURE — 97535 SELF CARE MNGMENT TRAINING: CPT

## 2021-12-08 RX ADMIN — OXYCODONE HYDROCHLORIDE AND ACETAMINOPHEN 1 TABLET: 5; 325 TABLET ORAL at 11:46

## 2021-12-08 RX ADMIN — CELECOXIB 200 MG: 100 CAPSULE ORAL at 09:38

## 2021-12-08 RX ADMIN — PANTOPRAZOLE SODIUM 40 MG: 40 TABLET, DELAYED RELEASE ORAL at 09:38

## 2021-12-08 RX ADMIN — MORPHINE SULFATE 2 MG: 2 INJECTION, SOLUTION INTRAMUSCULAR; INTRAVENOUS at 10:47

## 2021-12-08 RX ADMIN — Medication 5 MG: at 01:19

## 2021-12-08 RX ADMIN — OXYCODONE HYDROCHLORIDE AND ACETAMINOPHEN 1 TABLET: 5; 325 TABLET ORAL at 07:02

## 2021-12-08 RX ADMIN — OXYCODONE HYDROCHLORIDE AND ACETAMINOPHEN 1 TABLET: 5; 325 TABLET ORAL at 01:19

## 2021-12-08 RX ADMIN — LISINOPRIL 20 MG: 20 TABLET ORAL at 09:38

## 2021-12-08 RX ADMIN — CLINDAMYCIN IN 5 PERCENT DEXTROSE 600 MG: 12 INJECTION, SOLUTION INTRAVENOUS at 01:21

## 2021-12-08 NOTE — DISCHARGE INSTRUCTIONS
Ok for regular diet  Do not drive for next 2 weeks  Ok to shower but be aware you are a fall risk so have someone with you or place a chair in the shower. It is ok to wet the breast. Wash the drain site with water and liquid soap everyday. No sponge or cloths. Wash the drain site before other parts of the body  Strip drains q 8 hours and record drain output daily. Wash hands or wear gloves when manipulating drains.  Do not exercise for the next 2 weeks.  Sleep in an upright position  No bra for 1 week. After that, wear a comfortable soft bra  No exercise  Ok to move arms slowly to the shoulder level (brushing hair movement)

## 2021-12-08 NOTE — DISCHARGE SUMMARY
Date of Discharge:  12/8/2021    Discharge Diagnosis:   Malignant neoplasm of left breast in female, estrogen receptor positive, unspecified site of breast (HCC) [C50.912, Z17.0]  Post-Op Diagnosis Codes:     * Malignant neoplasm of left breast in female, estrogen receptor positive, unspecified site of breast (HCC) [C50.912, Z17.0]     Problem List:  Active Hospital Problems    Diagnosis  POA   • **Malignant neoplasm of left breast in female, estrogen receptor positive (HCC) [C50.912, Z17.0]  Not Applicable   • S/P bilateral mastectomy [Z90.13]  Not Applicable   • Status post bilateral breast reconstruction with implant placement [Z98.890]  Not Applicable      Resolved Hospital Problems   No resolved problems to display.       Presenting Problem/History of Present Illness          Hospital Course  Patient is a 61 y.o. female who came into [facility] on 12/7/2021 for a planned bilateral mastectomy and bilateral breast reconstruction with implants.  She was admitted after the procedure for routine postoperative care upon discharge home her pain is controlled she is tolerating a regular diet and ambulating without difficulty.    Procedures Performed    Procedure(s):  MASTECTOMY  BILATERAL BREAST RECONSTRUCTION WITH IMPLANTS, IMPLANT OF BIOLOGICAL MESH,  USE OF SPY, AND BILATERAL CHEST LIPOSUCTION  -------------------       Consults:   Consults     No orders found for last 30 day(s).          Pertinent Test Results:       Vital Signs  Temp:  [97.34 °F (36.3 °C)-99.2 °F (37.3 °C)] 97.34 °F (36.3 °C)  Heart Rate:  [59-81] 62  Resp:  [16-18] 16  BP: (100-137)/(44-79) 137/58    Discharge Disposition  Home or Self Care    Discharge Medications     Discharge Medications      Changes to Medications      Instructions Start Date   celecoxib 200 MG capsule  Commonly known as: CeleBREX  What changed: additional instructions   200 mg, Oral, 2 Times Daily      pantoprazole 40 MG EC tablet  Commonly known as: PROTONIX  What changed: when  to take this   40 mg, Oral, Daily         Continue These Medications      Instructions Start Date   amLODIPine 2.5 MG tablet  Commonly known as: NORVASC   2.5 mg, Oral, Nightly      atorvastatin 10 MG tablet  Commonly known as: LIPITOR   10 mg, Oral, Every Night at Bedtime      baclofen 10 MG tablet  Commonly known as: LIORESAL   10 mg, Oral, Nightly      busPIRone 10 MG tablet  Commonly known as: BUSPAR   10 mg, Oral, Nightly      clindamycin 150 MG capsule  Commonly known as: CLEOCIN   150 mg, Oral, Daily Before Lunch, INSTRUCTED TO TAKE MEDICINE 2 HOURS PRIOR TO SURGERY       diclofenac 75 MG EC tablet  Commonly known as: VOLTAREN   75 mg, Oral, Daily      escitalopram 20 MG tablet  Commonly known as: LEXAPRO   20 mg, Oral, Nightly      lisinopril-hydrochlorothiazide 20-12.5 MG per tablet  Commonly known as: PRINZIDE,ZESTORETIC   1 tablet, Oral, Nightly      mirtazapine 15 MG tablet  Commonly known as: REMERON   15 mg, Oral, Every Night at Bedtime      promethazine 25 MG tablet  Commonly known as: PHENERGAN   25 mg, Oral, Every 6 Hours PRN      tamsulosin 0.4 MG capsule 24 hr capsule  Commonly known as: FLOMAX   1 capsule, Oral, Nightly             Discharge Diet       Activity at Discharge  Activity Instructions     Other Activity Instructions      Activity Instructions:  Ok for regular diet  Do not drive for next 2 weeks  Ok to shower but be aware you are a fall risk so have someone with you or place a chair in the shower. It is ok to wet the breast. Wash the drain site with water and liquid soap everyday. No sponge or cloths. Wash the drain site before other parts of the body  Strip drains q 8 hours and record drain output daily. Wash hands or wear gloves when manipulating drains.  Do not exercise for the next 2 weeks.  Sleep in an upright position  No bra for 1 week. After that, wear a comfortable soft bra  No exercise  Ok to move arms slowly to the shoulder level (brushing hair movement)          Follow-up  Appointments  Future Appointments   Date Time Provider Department Center   12/15/2021  9:30 AM Hoda Espinoza APRN [facility] [place] [facility]   12/20/2021  3:00 PM Mariela Watson MD PhD [id number]   12/29/2021  2:30 PM Lenore Rider, OT [facility]     Additional Instructions for the Follow-ups that You Need to Schedule     Discharge Follow-up with Specified Provider: Dr Chaparro in 1 week   As directed      To: Dr Chaparro in 1 week         Discharge Follow-up with Specified Provider: Dr Cullen as already scheduled   As directed      To: Dr Cullen as already scheduled               Test Results Pending at Discharge

## 2021-12-08 NOTE — PROGRESS NOTES
[facility]     Progress Note    Patient Name: Margy Jimenez  : 1960  MRN: [id number]    Date of admission: 2021    Subjective   Subjective     Patient without complaints.  Tolerating diet.  Pain controlled.     Objective   Objective     Review of Systems:    A 10 point review of systems was performed and all are negative except for what is documented in the HPI.     Vitals:   Temp:  [97.8 °F (36.6 °C)-99.2 °F (37.3 °C)] 98.06 °F (36.7 °C)  Heart Rate:  [59-97] 59  Resp:  [12-18] 16  BP: (100-136)/(44-79) 110/47  Flow (L/min):  [2] 2  FiO2 (%):  [80 %-100 %] 100 %  Physical Exam    Constitutional: Awake, alert   Eyes: PERRLA    Neck: Supple, trachea midline   Respiratory: respirations even and unlabored   Cardiovascular: RRR   Gastrointestinal: Soft, nontender, nondistended   Psychiatric: Appropriate affect, cooperative   Neurologic: Oriented x 3, speech clear   Skin: bilateral chest incisions intact, open to air. JPs with serosang output.     Result Review    Result Review:  I have personally reviewed the results from the time of this admission to 2021 09:30 EST and agree with these findings:  []  Laboratory  []  Microbiology  []  Radiology  []  EKG/Telemetry   []  Cardiology/Vascular   []  Pathology  []  Old records  []  Other:      Assessment/Plan   Assessment / Plan     Diagnosis    Bilateral mastectomy    Active Hospital Problems:  Active Hospital Problems    Diagnosis    • **Malignant neoplasm of left breast in female, estrogen receptor positive (HCC)    • S/P bilateral mastectomy    • Status post bilateral breast reconstruction with implant placement        Plan:      Discharge home  DVT prophylaxis:  Mechanical DVT prophylaxis orders are present.          This document has been electronically signed by CECI Dixon  2021 09:30 EST

## 2021-12-08 NOTE — THERAPY EVALUATION
Patient Name: Margy Jimenez  : 1960    MRN: [id number]                              Today's Date: 2021       Admit Date: 2021    Visit Dx:     ICD-10-CM ICD-9-CM   1. Malignant neoplasm of left breast in female, estrogen receptor positive, unspecified site of breast (HCC)  C50.912 174.9    Z17.0 V86.0   2. At risk for self care deficit  Z91.89 V49.89   3. At risk for lymphedema  Z91.89 V49.89     Patient Active Problem List   Diagnosis   • High blood pressure   • Mood disorder (HCC)   • Anxiety   • Biceps tendinitis, right   • Fatigue   • GERD (gastroesophageal reflux disease)   • Hyperlipidemia   • Migraine   • Nontraumatic incomplete tear of right rotator cuff   • Pain and swelling of right shoulder   • S/P rotator cuff repair   • Shoulder impingement, right   • Hypertension   • Depression   • Gastroesophageal reflux disease   • Heartburn   • Malignant neoplasm of left breast in female, estrogen receptor positive (HCC)   • S/P bilateral mastectomy   • Status post bilateral breast reconstruction with implant placement     Past Medical History:   Diagnosis Date   • Cancer (HCC)     BREAST   • GERD (gastroesophageal reflux disease)    • High blood pressure    • Hyperlipidemia    • Mood disorder (HCC)    • Neoplasm of left breast    • S/P bilateral mastectomy 2021   • Status post bilateral breast reconstruction with implant placement 2021     Past Surgical History:   Procedure Laterality Date   • ABDOMINAL HYSTERECTOMY     • BREAST BIOPSY Left 10/26/2021    Procedure: LEFT BREAST LUMPECTOMY WITH SENTINEL NODE BIOPSY AND NEEDLE LOCALIZATION;  Surgeon: Ayah Chaparro MD;  Location: [facility] MAIN OR;  Service: General;  Laterality: Left;   • BUNIONECTOMY     • COLONOSCOPY      2018    • COLONOSCOPY N/A 2021    Procedure: COLONOSCOPY WITH INJECTION ORISE/POLYPECTOMY HOT SNARE/ENDO CLIP X3;  Surgeon: David Hernandez MD;  Location: [facility] ENDOSCOPY;  Service:  Gastroenterology;  Laterality: N/A;  COLON POLYP   • CYSTOSCOPY     • ENDOSCOPY     • ENDOSCOPY N/A 8/31/2021    Procedure: ESOPHAGOGASTRODUODENOSCOPY WITH BIOPSY;  Surgeon: David Hernandez MD;  Location: [facility] ENDOSCOPY;  Service: Gastroenterology;  Laterality: N/A;  GASTRIC POLYPS/REFLUX ESOPHAGITIS   • LAPAROSCOPIC CHOLECYSTECTOMY     • ROTATOR CUFF REPAIR Right       General Information     Row Name 12/08/21 1342 12/08/21 1301       OT Time and Intention    Document Type therapy note (daily note)  -LF evaluation  -LF    Mode of Treatment individual therapy; occupational therapy  -LF individual therapy; occupational therapy  -    Row Name 12/08/21 1342 12/08/21 1301       General Information    Patient Profile Reviewed -- yes  -LF    Prior Level of Function -- --  Independent with ADLs, ambulated without a device, walk-in shower where she stands to shower but has a chair if needed, standard commode, stands to groom, drives, and no home O2.  -LF    Existing Precautions/Restrictions other (see comments)  No shoulder flexion >90°, PUNEET drains x2  -LF other (see comments)  No shoulder flexion >90°, PUNEET drains x2  -LF    Barriers to Rehab -- none identified  -    Row Name 12/08/21 1301          Occupational Profile    Reason for Services/Referral (Occupational Profile) Patient is currently status post bilateral mastectomy with implant placement on December 7th, 2021.  Occupational therapy consulted for fitting of post-surgical compression garment and assessment of ADLs/functional transfers. No previous occupational therapy services for current condition.  -     Row Name 12/08/21 1301          Living Environment    Lives With alone  -     Row Name 12/08/21 1301          Home Main Entrance    Number of Stairs, Main Entrance none  -     Row Name 12/08/21 1301          Stairs Within Home, Primary    Number of Stairs, Within Home, Primary none  -     Row Name 12/08/21 1301          Cognition    Orientation  Status (Cognition) oriented x 3  -LF     Row Name 12/08/21 1301          Safety Issues, Functional Mobility    Impairments Affecting Function (Mobility) other (see comments)  N/A  -LF           User Key  (r) = Recorded By, (t) = Taken By, (c) = Cosigned By    Initials Name Provider Type    LF Mojgan Nye OT Occupational Therapist                 Mobility/ADL's     Row Name 12/08/21 1343 12/08/21 1330       Bed Mobility    Bed Mobility supine-sit-supine  -LF supine-sit-supine  -LF    Supine-Sit [place] (Bed Mobility) independent  -LF independent  -LF    Assistive Device (Bed Mobility) bed rails; head of bed elevated  -LF bed rails; head of bed elevated  -LF    Row Name 12/08/21 1343 12/08/21 1330       Transfers    Transfers sit-stand transfer  -LF sit-stand transfer  -LF    Sit-Stand [place] (Transfers) independent  -LF independent  -LF    Row Name 12/08/21 1343 12/08/21 1330       Sit-Stand Transfer    Assistive Device (Sit-Stand Transfers) other (see comments)  no device  -LF other (see comments)  no device  -LF    Row Name 12/08/21 1343 12/08/21 1330       Functional Mobility    Functional Mobility- Ind. Level independent  -LF independent  -LF    Functional Mobility- Device other (see comments)  no device  -LF other (see comments)  no device  -LF    Row Name 12/08/21 1343 12/08/21 1330       Activities of Daily Living    BADL Assessment/Intervention upper body dressing; lower body dressing  -LF bathing; upper body dressing; lower body dressing; grooming; feeding; toileting  -LF    Row Name 12/08/21 1330          Bathing Assessment/Intervention    [place] Level (Bathing) bathing skills; upper body; lower body; independent  -LF     Row Name 12/08/21 1343 12/08/21 1330       Upper Body Dressing Assessment/Training    [place] Level (Upper Body Dressing) upper body dressing skills; don; pull-over garment; independent; bra/undergarment; standby assist  -LF upper body dressing skills;  independent  -LF    Position (Upper Body Dressing) unsupported sitting; edge of bed sitting  -LF --    Comment (Upper Body Dressing) Educated patient on upper body dressing technique while adhering to surgical precautions (no shoulder flexion greater than 90°) sitting on EOB. SBA required for donning/doffing of Amrita baker.  - --    Row Name 12/08/21 1343 12/08/21 1330       Lower Body Dressing Assessment/Training    [place] Level (Lower Body Dressing) lower body dressing skills; don; pants/bottoms; independent  -LF lower body dressing skills; independent  -LF    Position (Lower Body Dressing) unsupported standing  -LF --    Row Name 12/08/21 1330          Grooming Assessment/Training    [place] Level (Grooming) grooming skills; independent  -LF     Row Name 12/08/21 1330          Self-Feeding Assessment/Training    [place] Level (Feeding) feeding skills; independent  -     Row Name 12/08/21 1330          Toileting Assessment/Training    [place] Level (Toileting) toileting skills; independent  -           User Key  (r) = Recorded By, (t) = Taken By, (c) = Cosigned By    Initials Name Provider Type     Mojgan Nye OT Occupational Therapist               Obj/Interventions     Row Name 12/08/21 1334          Sensory Assessment (Somatosensory)    Sensory Assessment (Somatosensory) UE sensation intact  -     Row Name 12/08/21 1334          Vision Assessment/Intervention    Visual Impairment/Limitations WFL; corrective lenses full-time  -     Row Name 12/08/21 1334          Range of Motion Comprehensive    General Range of Motion bilateral upper extremity ROM WFL  -     Row Name 12/08/21 1334          Strength Comprehensive (MMT)    General Manual Muscle Testing (MMT) Assessment no strength deficits identified  -     Comment, General Manual Muscle Testing (MMT) Assessment 5/5 bilateral upper extremities  -     Row Name 12/08/21 1346 12/08/21 1334       Motor Skills     Motor Skills functional endurance  -LF coordination; functional endurance  -LF    Coordination -- WFL  Right hand dominant  -LF    Functional Endurance Good for ADLs  -LF Good for ADLs  -LF    Row Name 12/08/21 1346 12/08/21 1334       Balance    Balance Assessment sitting dynamic balance; standing dynamic balance  -LF sitting dynamic balance; standing dynamic balance  -LF    Dynamic Sitting Balance WFL; unsupported; sitting, edge of bed  -LF WFL; unsupported; sitting, edge of bed  -LF    Dynamic Standing Balance WFL; unsupported; standing  -LF WFL; unsupported; standing  -LF    Balance Interventions sitting; standing; sit to stand; dynamic; occupation based/functional task  -LF --          User Key  (r) = Recorded By, (t) = Taken By, (c) = Cosigned By    Initials Name Provider Type    LF Mojgan Nye OT Occupational Therapist            Orthotic Management/Training:  - Location: Trunk  - Type: Amoena post surgical garment  - Type Modifier: off the shelf  - Functional Improvement: reduce surgical edema (allows for increased ROM), reduce scar tissue and support sensation normalization  - Fitting/Training Provided: sizing of orthotic, instructions in use, modification of orthotic    Orthotic Fabrication/Fit:  - Location: Bilateral  - Kind of Orthotic Needed: postsurgical compression camisole with drain management  - Reason for prosthetic: reduce surgical edema (allows for increased ROM), reduce scar tissue and support sensation normalization  - Type of Orthotic Provided: post surgical compression camisole   - Brand Dispensed:[employer]le[employer]  - Model # Dispensed: Precious 2860  - Size Dispensed: 2XL, A/B  - Reason for Orthotic: Lymphedema management/prevention, drain management  - Date to Initiate Orthotic Use: December 8th, 2021  - Wearing Schedule: continuously except for bathing  - Tolerance: tolerates well  - Number of garments dispensed: 1   Goals/Plan    Limitation in Self-Care Goal 1 (OT)  Activity/Device: To prevent  exacerbation of post-surgical edema, patient will utilize the Amrita Lang camisole kit 2860  [place] Level/Cues Needed: Independently  Time Frame: long term goal (LTG); 10 days  Treatment: Fitting and training of Amrita Soriano style 2860 post-surgical camisole size 2XL A/B, orthotic management/training, ADL retraining, and patient education              Clinical Impression     Row Name 12/08/21 6775          Pain Assessment    Additional Documentation Pain Scale: FACES Pre/Post-Treatment (Group)  -LF     Row Name 12/08/21 6051          Pain Scale: FACES Pre/Post-Treatment    Pain: FACES Scale, Pretreatment 0-->no hurt  -LF     Posttreatment Pain Rating 0-->no hurt  -LF     Row Name 12/08/21 1346 12/08/21 5155       Plan of Care Review    Plan of Care Reviewed With -- patient  -LF    Progress -- no change  -LF    Outcome Summary Recommended patient follow-up with surgeon over exercise guidelines and encouraged her to only complete gentle ROM exercises until clarification. Also encouraged follow-up with Lenore Brown, lymphedema specialist at [facility] outpatient clinic. Included Lenore's business card with educational materials for patient reference.  -LF Patient presents with limitations, noted below, that impede her ability to perform ADLs. The skills of a therapist will be required to safely and effectively implement the following treatment plan to restore maximal level of function.  -    Row Name 12/08/21 8145          Therapy Assessment/Plan (OT)    Patient/Family Therapy Goal Statement (OT) To discharge home this afternoon.  -     Rehab Potential (OT) good, to achieve stated therapy goals  -     Criteria for Skilled Therapeutic Interventions Met (OT) yes; meets criteria; skilled treatment is necessary  -LF     Therapy Frequency (OT) evaluation only  with 1 treatment  -LF     Row Name 12/08/21 7955          Therapy Plan Review/Discharge Plan (OT)    Anticipated Discharge Disposition (OT) home with assist;  home with outpatient therapy services  -     Row Name 12/08/21 1335          Vital Signs    O2 Delivery Pre Treatment room air  -LF     O2 Delivery Intra Treatment room air  -LF     O2 Delivery Post Treatment room air  -LF           User Key  (r) = Recorded By, (t) = Taken By, (c) = Cosigned By    Initials Name Provider Type     Mojgan Nye OT Occupational Therapist               Outcome Measures     Row Name 12/08/21 1338          How much help from another is currently needed...    Putting on and taking off regular lower body clothing? 4  -LF     Bathing (including washing, rinsing, and drying) 4  -LF     Toileting (which includes using toilet bed pan or urinal) 4  -LF     Putting on and taking off regular upper body clothing 4  -LF     Taking care of personal grooming (such as brushing teeth) 4  -LF     Eating meals 4  -LF     AM-PAC 6 Clicks Score (OT) 24  -LF     Row Name 12/08/21 1338          Functional Assessment    Outcome Measure Options AM-PAC 6 Clicks Daily Activity (OT); Optimal Instrument  -LF     Row Name 12/08/21 1338          Optimal Instrument    Optimal Instrument Optimal - 3  -LF     Bending/Stooping 2  -LF     Standing 1  -LF     Reaching 2  -LF     From the list, choose the 3 activities you would most like to be able to do without any difficulty Bending/stooping; Standing; Reaching  -LF     Total Score Optimal - 3 5  -LF           User Key  (r) = Recorded By, (t) = Taken By, (c) = Cosigned By    Initials Name Provider Type     Mojgan Nye OT Occupational Therapist                Occupational Therapy Education                 Title: PT OT SLP Therapies (Done)     Topic: Occupational Therapy (Done)     Point: ADL training (Done)     Description:   Instruct learner(s) on proper safety adaptation and remediation techniques during self care or transfers.   Instruct in proper use of assistive devices.              Learning Progress Summary           Patient Acceptance, E,TB, VU by  LF at 12/8/2021 1339   Family Acceptance, E,TB, VU by  at 12/8/2021 1339                   Point: Home exercise program (Done)     Description:   Instruct learner(s) on appropriate technique for monitoring, assisting and/or progressing therapeutic exercises/activities.              Learning Progress Summary           Patient Acceptance, E,TB, VU by  at 12/8/2021 1339   Family Acceptance, E,TB, VU by LF at 12/8/2021 1339                   Point: Precautions (Done)     Description:   Instruct learner(s) on prescribed precautions during self-care and functional transfers.              Learning Progress Summary           Patient Acceptance, E,TB, VU by  at 12/8/2021 1339   Family Acceptance, E,TB, VU by  at 12/8/2021 1339                   Point: Body mechanics (Done)     Description:   Instruct learner(s) on proper positioning and spine alignment during self-care, functional mobility activities and/or exercises.              Learning Progress Summary           Patient Acceptance, E,TB, VU by  at 12/8/2021 1339   Family Acceptance, E,TB, VU by  at 12/8/2021 1339                               User Key     Initials Effective Dates Name Provider Type Discipline     06/16/21 -  Mojgan Nye OT Occupational Therapist OT              OT Recommendation and Plan  Therapy Frequency (OT): evaluation only (with 1 treatment)  Plan of Care Review  Plan of Care Reviewed With: patient  Progress: no change  Outcome Summary: Recommended patient follow-up with surgeon over exercise guidelines and encouraged her to only complete gentle ROM exercises until clarification. Also encouraged follow-up with Lenore Brown, lymphedema specialist at [facility] outpatient clinic. Included Lenore's business card with educational materials for patient reference.     Time Calculation:    Time Calculation- OT     Row Name 12/08/21 0950             Time Calculation- OT    OT Received On 12/08/21  -              Timed Charges    41164 - OT  Therapeutic Activity Minutes 15  -LF      33735 - OT Self Care/Mgmt Minutes 25  -LF              Untimed Charges    OT Eval/Re-eval Minutes 25  -LF              Total Minutes    Timed Charges Total Minutes 40  -LF      Untimed Charges Total Minutes 25  -LF       Total Minutes 65  -LF            User Key  (r) = Recorded By, (t) = Taken By, (c) = Cosigned By    Initials Name Provider Type     Mojgan Nye OT Occupational Therapist              Therapy Charges for Today     Code Description Service Date Service Provider Modifiers Qty    40074548892 HC OT THERAPEUTIC ACT EA 15 MIN 12/8/2021 Mojgan Nye, OT GO 1    85553490256 HC OT SELF CARE/MGMT/TRAIN EA 15 MIN 12/8/2021 Mojgan Nye OT GO 2    [id number]  CAMISOLE POST/SURG DAISHA ALL SZ/COLOR AMOENA 12/8/2021 Mojgan Nye OT  1    [id number]  OT EVAL LOW COMPLEXITY 2 12/8/2021 Mojgan Nye OT GO 1               Mojgan Nye OT  12/8/2021

## 2021-12-08 NOTE — THERAPY DISCHARGE NOTE
Acute Care - Occupational Therapy Discharge   [place]     Patient Name: Margy Jimenez  : 1960  MRN: [id number]  Today's Date: 2021               Admit Date: 2021       ICD-10-CM ICD-9-CM   1. Malignant neoplasm of left breast in female, estrogen receptor positive, unspecified site of breast (HCC)  C50.912 174.9    Z17.0 V86.0   2. At risk for self care deficit  Z91.89 V49.89   3. At risk for lymphedema  Z91.89 V49.89     Patient Active Problem List   Diagnosis   • High blood pressure   • Mood disorder (HCC)   • Anxiety   • Biceps tendinitis, right   • Fatigue   • GERD (gastroesophageal reflux disease)   • Hyperlipidemia   • Migraine   • Nontraumatic incomplete tear of right rotator cuff   • Pain and swelling of right shoulder   • S/P rotator cuff repair   • Shoulder impingement, right   • Hypertension   • Depression   • Gastroesophageal reflux disease   • Heartburn   • Malignant neoplasm of left breast in female, estrogen receptor positive (HCC)   • S/P bilateral mastectomy   • Status post bilateral breast reconstruction with implant placement     Past Medical History:   Diagnosis Date   • Cancer (HCC)     BREAST   • GERD (gastroesophageal reflux disease)    • High blood pressure    • Hyperlipidemia    • Mood disorder (HCC)    • Neoplasm of left breast    • S/P bilateral mastectomy 2021   • Status post bilateral breast reconstruction with implant placement 2021     Past Surgical History:   Procedure Laterality Date   • ABDOMINAL HYSTERECTOMY     • BREAST BIOPSY Left 10/26/2021    Procedure: LEFT BREAST LUMPECTOMY WITH SENTINEL NODE BIOPSY AND NEEDLE LOCALIZATION;  Surgeon: Ayah Chaparro MD;  Location: [facility] MAIN OR;  Service: General;  Laterality: Left;   • BUNIONECTOMY     • COLONOSCOPY      2018    • COLONOSCOPY N/A 2021    Procedure: COLONOSCOPY WITH INJECTION ORISE/POLYPECTOMY HOT SNARE/ENDO CLIP X3;  Surgeon: David Hernandez MD;  Location: [facility]  ENDOSCOPY;  Service: Gastroenterology;  Laterality: N/A;  COLON POLYP   • CYSTOSCOPY     • ENDOSCOPY     • ENDOSCOPY N/A 8/31/2021    Procedure: ESOPHAGOGASTRODUODENOSCOPY WITH BIOPSY;  Surgeon: David Hernandez MD;  Location: [facility] ENDOSCOPY;  Service: Gastroenterology;  Laterality: N/A;  GASTRIC POLYPS/REFLUX ESOPHAGITIS   • LAPAROSCOPIC CHOLECYSTECTOMY     • ROTATOR CUFF REPAIR Right        OT ASSESSMENT FLOWSHEET (last 12 hours)     OT Evaluation and Treatment    No documentation.                 Occupational Therapy Education                 Title: PT OT SLP Therapies (Done)     Topic: Occupational Therapy (Done)     Point: ADL training (Done)     Description:   Instruct learner(s) on proper safety adaptation and remediation techniques during self care or transfers.   Instruct in proper use of assistive devices.              Learning Progress Summary           Patient Acceptance, E,TB, VU by LF at 12/8/2021 1339   Family Acceptance, E,TB, VU by LF at 12/8/2021 1339                   Point: Home exercise program (Done)     Description:   Instruct learner(s) on appropriate technique for monitoring, assisting and/or progressing therapeutic exercises/activities.              Learning Progress Summary           Patient Acceptance, E,TB, VU by LF at 12/8/2021 1339   Family Acceptance, E,TB, VU by LF at 12/8/2021 1339                   Point: Precautions (Done)     Description:   Instruct learner(s) on prescribed precautions during self-care and functional transfers.              Learning Progress Summary           Patient Acceptance, E,TB, VU by LF at 12/8/2021 1339   Family Acceptance, E,TB, VU by LF at 12/8/2021 1339                   Point: Body mechanics (Done)     Description:   Instruct learner(s) on proper positioning and spine alignment during self-care, functional mobility activities and/or exercises.              Learning Progress Summary           Patient Acceptance, E,TB, VU by LF at 12/8/2021 1339    Family Acceptance, E,TB, VU by  at 12/8/2021 1339                               User Key     Initials Effective Dates Name Provider Type Discipline     06/16/21 -  Mojgan Nye OT Occupational Therapist OT                OT Recommendation and Plan  Therapy Frequency (OT): evaluation only (with 1 treatment)  Plan of Care Review  Plan of Care Reviewed With: patient  Progress: no change  Outcome Summary: Recommended patient follow-up with surgeon over exercise guidelines and encouraged her to only complete gentle ROM exercises until clarification. Also encouraged follow-up with Lenore Brown lymphedema specialist at [facility] outpatient clinic. Included Lenore's business card with educational materials for patient reference.  Plan of Care Reviewed With: patient  Outcome Summary: Recommended patient follow-up with surgeon over exercise guidelines and encouraged her to only complete gentle ROM exercises until clarification. Also encouraged follow-up with Lenore Brown lymphedema specialist at [facility] outpatient clinic. Included Lenore's business card with educational materials for patient reference.             Time Calculation:    Time Calculation- OT     Row Name 12/08/21 0950             Time Calculation- OT    OT Received On 12/08/21  -LF              Timed Charges    24970 - OT Therapeutic Activity Minutes 15  -LF      77009 - OT Self Care/Mgmt Minutes 25  -LF              Untimed Charges    OT Eval/Re-eval Minutes 25  -LF              Total Minutes    Timed Charges Total Minutes 40  -LF      Untimed Charges Total Minutes 25  -LF       Total Minutes 65  -LF            User Key  (r) = Recorded By, (t) = Taken By, (c) = Cosigned By    Initials Name Provider Type     Mojgan Nye OT Occupational Therapist              Timed Therapy Charges  Total Units: 3    Charges  Total Units: 3    Procedure Name Documented Minutes Units Code    HC OT SELF CARE/MGMT/TRAIN EA 15 MIN 25  2    62936 (CPT®)       OT  THERAPEUTIC ACT EA 15 MIN 15  1    86016 (CPT®)               Documented Minutes  Total Minutes: 40    Therapy Provided Minutes    78279 - OT Self Care/Mgmt Minutes 25    65625 - OT Therapeutic Activity Minutes 15              Therapy Charges for Today     Code Description Service Date Service Provider Modifiers Qty    13738571595 HC OT THERAPEUTIC ACT EA 15 MIN 12/8/2021 Mojgan Nye, OT GO 1    67790506333 HC OT SELF CARE/MGMT/TRAIN EA 15 MIN 12/8/2021 Mojgan Nye OT GO 2    [id number]  CAMISOLE POST/SURG DAISHA ALL SZ/COLOR AMOENA 12/8/2021 Mojgan Nye, OT  1    [id number]  OT EVAL LOW COMPLEXITY 2 12/8/2021 Mojgan Nye, OT GO 1               OT Discharge Summary  Anticipated Discharge Disposition (OT): home with assist, home with outpatient therapy services  Reason for Discharge: Discharge from facility, Per MD order  Outcomes Achieved: Discharge from facility occurred on same date as evluation  Discharge Destination: Home with assist, Home with outpatient services    Mojgan Nye OT  12/8/2021

## 2021-12-08 NOTE — PLAN OF CARE
Goal Outcome Evaluation:  Plan of Care Reviewed With: patient        Progress: no change  Outcome Summary: Patient presents with limitations, noted below, that impede her ability to perform ADLs. The skills of a therapist will be required to safely and effectively implement the following treatment plan to restore maximal level of function.

## 2021-12-08 NOTE — PLAN OF CARE
Goal Outcome Evaluation:      Patient having trouble overnight. MD on call ordered melatonin. Melatonin has seemed to help and patient able to sleep on and off. Patient required pain medication two times this shift.

## 2021-12-08 NOTE — PROGRESS NOTES
PLASTIC SURGERY PROGRESS NOTE    Patient Identification:  Name: Margy Jimenez    Age: 61 y.o.    Sex: female   :  1960  MRN: [id number]               Subjective:  No acute events.    Objective:    Continuous Infusions:lactated ringers, 9 mL/hr, Last Rate: 125 mL/hr (21 0830)        Scheduled Meds:amLODIPine, 2.5 mg, Oral, Nightly  atorvastatin, 10 mg, Oral, Nightly  baclofen, 10 mg, Oral, Nightly  busPIRone, 10 mg, Oral, Nightly  celecoxib, 200 mg, Oral, BID  escitalopram, 20 mg, Oral, Nightly  hydroCHLOROthiazide, 12.5 mg, Oral, Daily  lisinopril, 20 mg, Oral, Daily  mirtazapine, 15 mg, Oral, Nightly  pantoprazole, 40 mg, Oral, BID  tamsulosin, 0.4 mg, Oral, Nightly      PRN Meds:•  acetaminophen **OR** acetaminophen  •  lactated ringers  •  melatonin  •  Morphine  •  oxyCODONE-acetaminophen  •  promethazine  •  senna-docusate sodium    Vital signs in last 24 hours:  Vitals:    21 1924 21 2225 21 0238 21 0411   BP: 127/51 109/49 100/44 116/55   BP Location: Right leg Right leg Right leg Right leg   Patient Position: Lying Lying Lying Lying   Pulse: 70 59 63 70   Resp: 16 16 16    Temp: 97.8 °F (36.6 °C) 97.9 °F (36.6 °C) 98 °F (36.7 °C)    TempSrc: Oral Oral Oral    SpO2: 94% 95% 99%    Weight:       Height:           Intake/Output:I/O last 3 completed shifts:  In: 2235 [P.O.:485; I.V.:1750]  Out: 800 [Urine:400; Drains:200; Blood:200]    Exam:  GEN: no acute distress, resting quietly   HEENT: NCAT, EOMI, MMM   HEART: normal rate, regular rhythm   LUNGS: respirations non-labored   ABD: soft, nondistended, nontender   EXT: moves all extremities, no edema      Recent Results (from the past 24 hour(s))   ECG 12 Lead    Collection Time: 21  6:51 AM   Result Value Ref Range    QT Interval 435 ms         Assessment:    Malignant neoplasm of left breast in female, estrogen receptor positive (HCC)      1 Day Post-Op Procedure(s) (LRB):  MASTECTOMY (Bilateral)  BILATERAL  BREAST RECONSTRUCTION WITH IMPLANTS, IMPLANT OF BIOLOGICAL MESH,  USE OF SPY, AND BILATERAL CHEST LIPOSUCTION (Bilateral)    Plan:  Ok for regular diet  Do not drive for next 2 weeks  Ok to shower but be aware you are a fall risk so have someone with you or place a chair in the shower. It is ok to wet the breast. Wash the drain site with water and liquid soap everyday. No sponge or cloths. Wash the drain site before other parts of the body  Strip drains q 8 hours and record drain output daily. Wash hands or wear gloves when manipulating drains.  Do not exercise for the next 2 weeks.  Sleep in an upright position  No bra for 1 week. After that, wear a comfortable soft bra  No exercise  Ok to move arms slowly to the shoulder level (brushing hair movement)    Home meds (pain, antibiotic and anti nausea medications have been prescribed and filled already  Neena Pires MD    12/8/2021

## 2021-12-09 ENCOUNTER — READMISSION MANAGEMENT (OUTPATIENT)
Dept: CALL CENTER | Facility: HOSPITAL | Age: 61
End: 2021-12-09

## 2021-12-09 NOTE — OUTREACH NOTE
Prep Survey      Responses   [facility] patient discharged from? [facility]   Is LACE score < 7 ? Yes   Emergency Room discharge w/ pulse ox? No   Eligibility Not Eligible   What are the reasons patient is not eligible? Other   Does the patient have one of the following disease processes/diagnoses(primary or secondary)? Other   Prep survey completed? Yes          Monalisa Jeffery RN

## 2021-12-13 ENCOUNTER — OFFICE VISIT (OUTPATIENT)
Dept: PLASTIC SURGERY | Facility: CLINIC | Age: 61
End: 2021-12-13

## 2021-12-13 VITALS
OXYGEN SATURATION: 98 % | SYSTOLIC BLOOD PRESSURE: 131 MMHG | TEMPERATURE: 97.4 F | HEART RATE: 73 BPM | DIASTOLIC BLOOD PRESSURE: 84 MMHG

## 2021-12-13 DIAGNOSIS — Z17.0 MALIGNANT NEOPLASM OF UPPER-OUTER QUADRANT OF LEFT BREAST IN FEMALE, ESTROGEN RECEPTOR POSITIVE (HCC): Primary | ICD-10-CM

## 2021-12-13 DIAGNOSIS — Z48.89 POSTOPERATIVE VISIT: ICD-10-CM

## 2021-12-13 DIAGNOSIS — C50.412 MALIGNANT NEOPLASM OF UPPER-OUTER QUADRANT OF LEFT BREAST IN FEMALE, ESTROGEN RECEPTOR POSITIVE (HCC): Primary | ICD-10-CM

## 2021-12-13 PROCEDURE — 99024 POSTOP FOLLOW-UP VISIT: CPT | Performed by: NURSE PRACTITIONER

## 2021-12-13 NOTE — PROGRESS NOTES
Follow-up (stitch came loose from right drain tube, noted some drainage from tube site.)            History of Present Illness  Margy Jimenez is a 61 y.o. female who presents to [facility]GERY as a post operative follow up. Had BILATERAL MASTECTOMY WITH BREAST RECONSTRUCTION WITH IMPLANTS, IMPLANT OF BIOLOGICAL MESH,  USE OF SPY, AND BILATERAL CHEST LIPOSUCTION on 12/7/21. Pain is controlled. Feels like suture came off right drain and feels like it is pulling.   Subjective      Hydromorphone, Hydromorphone hcl, and Cephalexin  Allergies Reconciled.    Review of Systems     Objective     /84 (BP Location: Right arm, Patient Position: Sitting)   Pulse 73   Temp 97.4 °F (36.3 °C) (Temporal)   SpO2 98%     There is no height or weight on file to calculate BMI.    Physical Exam  Mastectomy incisions with edges well approximated, no erythema, expected ecchymosis and edema, , bilateral drains in place with serosanguineous fluid                                                         Result Review :              Assessment and Plan      Diagnoses and all orders for this visit:    1. Malignant neoplasm of upper-outer quadrant of left breast in female, estrogen receptor positive (HCC) (Primary)        Additional Order(s):       Plan:  • Applied gauze to drain sites. Keep drain sites clean and dry, limit upper body activity, rtc one week    Follow Up     No follow-ups on file.    Patient was given instructions and counseling regarding her condition. Please see specific information pulled into the AVS if appropriate.     Hoda Espinoza, APRN  12/13/2021

## 2021-12-17 ENCOUNTER — OFFICE VISIT (OUTPATIENT)
Dept: PLASTIC SURGERY | Facility: CLINIC | Age: 61
End: 2021-12-17

## 2021-12-17 VITALS
SYSTOLIC BLOOD PRESSURE: 132 MMHG | HEIGHT: 63 IN | OXYGEN SATURATION: 97 % | DIASTOLIC BLOOD PRESSURE: 76 MMHG | BODY MASS INDEX: 37.39 KG/M2 | WEIGHT: 211 LBS | TEMPERATURE: 97.2 F | HEART RATE: 74 BPM

## 2021-12-17 DIAGNOSIS — Z09 POSTOPERATIVE FOLLOW-UP: Primary | ICD-10-CM

## 2021-12-17 PROCEDURE — 99024 POSTOP FOLLOW-UP VISIT: CPT | Performed by: NURSE PRACTITIONER

## 2021-12-17 RX ORDER — ACETAMINOPHEN 325 MG/1
TABLET ORAL
COMMUNITY
Start: 2021-12-04 | End: 2022-04-12

## 2021-12-17 NOTE — PROGRESS NOTES
"Chief Complaint  No chief complaint on file.    Subjective          History of Present Illness  Margy Jimenez is a 61 y.o. female who presents to [facility] PLASTIC [facility] RECONSTRU[facility]IVE SURGERY for pain and irration with drains on right and over lipo sites, has bruising over upper back and bruised area on right side is very tender, out put on drains is less than 30 today but was 35 yesterday.     Allergies: Hydromorphone, Hydromorphone hcl, and Cephalexin  Allergies Reconciled.    Review of Systems     Objective     /76 (BP Location: Right arm, Patient Position: Sitting, Cuff Size: Adult)   Pulse 74   Temp 97.2 °F (36.2 °C) (Temporal)   Ht 160 cm (63\")   Wt 95.7 kg (211 lb)   SpO2 97%   BMI 37.38 kg/m²     Body mass index is 37.38 kg/m².    Physical Exam  Mastectomy incisions with edges well approximated, no erythema, expected ecchymosis and edema, bilateral drains in place with serosanguineous fluid, moderate tenderness of upper back    Result Review :                Assessment and Plan      Diagnoses and all orders for this visit:    1. Postoperative follow-up (Primary)        Plan:  • Abd pads in camisole over upper back/axilla area, no signs of infection, drain intact, is moderately tender on right upper back and has some ecchymosis. Patient notes the pads and compression has helped already. Will see her on Tuesday and maybe able to remove drain.         Follow Up     No follow-ups on file.    Patient was given instructions and counseling regarding her condition. Please see specific information pulled into the AVS if appropriate.     Hoda Espinoza, APRN  12/17/2021  "

## 2021-12-20 ENCOUNTER — OFFICE VISIT (OUTPATIENT)
Dept: ONCOLOGY | Facility: HOSPITAL | Age: 61
End: 2021-12-20

## 2021-12-20 VITALS
OXYGEN SATURATION: 97 % | WEIGHT: 209.22 LBS | DIASTOLIC BLOOD PRESSURE: 65 MMHG | TEMPERATURE: 97.3 F | RESPIRATION RATE: 18 BRPM | SYSTOLIC BLOOD PRESSURE: 105 MMHG | HEART RATE: 93 BPM | BODY MASS INDEX: 37.06 KG/M2

## 2021-12-20 DIAGNOSIS — Z17.0 MALIGNANT NEOPLASM OF UPPER-OUTER QUADRANT OF LEFT BREAST IN FEMALE, ESTROGEN RECEPTOR POSITIVE: ICD-10-CM

## 2021-12-20 DIAGNOSIS — C50.412 MALIGNANT NEOPLASM OF UPPER-OUTER QUADRANT OF LEFT BREAST IN FEMALE, ESTROGEN RECEPTOR POSITIVE: ICD-10-CM

## 2021-12-20 PROCEDURE — G0463 HOSPITAL OUTPT CLINIC VISIT: HCPCS | Performed by: INTERNAL MEDICINE

## 2021-12-20 PROCEDURE — 99215 OFFICE O/P EST HI 40 MIN: CPT | Performed by: INTERNAL MEDICINE

## 2021-12-21 ENCOUNTER — OFFICE VISIT (OUTPATIENT)
Dept: PLASTIC SURGERY | Facility: CLINIC | Age: 61
End: 2021-12-21

## 2021-12-21 VITALS
BODY MASS INDEX: 36.86 KG/M2 | OXYGEN SATURATION: 100 % | HEIGHT: 63 IN | DIASTOLIC BLOOD PRESSURE: 75 MMHG | HEART RATE: 78 BPM | SYSTOLIC BLOOD PRESSURE: 110 MMHG | TEMPERATURE: 97.8 F | WEIGHT: 208 LBS

## 2021-12-21 DIAGNOSIS — Z09 POSTOPERATIVE FOLLOW-UP: ICD-10-CM

## 2021-12-21 DIAGNOSIS — Z17.0 MALIGNANT NEOPLASM OF UPPER-OUTER QUADRANT OF LEFT BREAST IN FEMALE, ESTROGEN RECEPTOR POSITIVE (HCC): Primary | ICD-10-CM

## 2021-12-21 DIAGNOSIS — C50.412 MALIGNANT NEOPLASM OF UPPER-OUTER QUADRANT OF LEFT BREAST IN FEMALE, ESTROGEN RECEPTOR POSITIVE (HCC): Primary | ICD-10-CM

## 2021-12-21 PROCEDURE — 99024 POSTOP FOLLOW-UP VISIT: CPT | Performed by: NURSE PRACTITIONER

## 2021-12-21 NOTE — PROGRESS NOTES
"Chief Complaint  Post-op Follow-up ( 1 week post op breast recon / implants )    Subjective          History of Present Illness  Margy Jimenez is a 61 y.o. female who presents to [facility]LASTIC [facility] RECON[facility] SURGERY s/p MASTECTOMY AND BILATERAL BREAST RECONSTRUCTION WITH IMPLANTS, IMPLANT OF BIOLOGICAL MESH,  USE OF SPY, AND BILATERAL CHEST LIPOSUCTION on 12/7/21.has bruising over upper back and bruised area on right side is very tender, output on drains is less than 30 ml x 3 days.   Patient stated that she feels a pulling sensation on the left side inside the breast. It is not red or warm. Right implant sitting high.      Allergies: Hydromorphone, Hydromorphone hcl, and Cephalexin  Allergies Reconciled.    Review of Systems     Objective     /75 (BP Location: Right arm, Patient Position: Sitting)   Pulse 78   Temp 97.8 °F (36.6 °C) (Temporal)   Ht 160 cm (63\")   Wt 94.3 kg (208 lb)   SpO2 100%   BMI 36.85 kg/m²     Body mass index is 36.85 kg/m².    Physical Exam  Mastectomy incisions with edges well approximated, no erythema, expected ecchymosis and edema, bilateral drains in place with serosanguineous fluid, moderate tenderness of upper back, 3 cm x 2 cm firm area right upper back/posterior to drain site, no erythema or warmth    Result Review :                Assessment and Plan      Diagnoses and all orders for this visit:    1. Malignant neoplasm of upper-outer quadrant of left breast in female, estrogen receptor positive (HCC) (Primary)    2. Postoperative follow-up        Plan:  • Removed bilateral drains, patient tolerated well. May massage firm area over right upper back.  Will  Monitor implant since right is sitting high, explained it is still early and may settle later. Monitor tugging sensation left upper breast, possible mesh settling. Bacitracin oint and gauze over drain sites until healed.RTC 1 month post op.        Follow Up     No follow-ups on " file.    Patient was given instructions and counseling regarding her condition. Please see specific information pulled into the AVS if appropriate.     Hoda Espinoza, APRN  12/21/2021

## 2021-12-29 ENCOUNTER — HOSPITAL ENCOUNTER (OUTPATIENT)
Dept: OCCUPATIONAL THERAPY | Facility: HOSPITAL | Age: 61
Setting detail: THERAPIES SERIES
Discharge: HOME | End: 2021-12-29

## 2021-12-29 DIAGNOSIS — Z17.0 MALIGNANT NEOPLASM OF LEFT BREAST IN FEMALE, ESTROGEN RECEPTOR POSITIVE, UNSPECIFIED SITE OF BREAST (HCC): ICD-10-CM

## 2021-12-29 DIAGNOSIS — Z01.818 ENCOUNTER FOR PREOPERATIVE ASSESSMENT: ICD-10-CM

## 2021-12-29 DIAGNOSIS — C50.912 MALIGNANT NEOPLASM OF LEFT BREAST IN FEMALE, ESTROGEN RECEPTOR POSITIVE, UNSPECIFIED SITE OF BREAST (HCC): ICD-10-CM

## 2021-12-29 DIAGNOSIS — Z91.89 AT RISK FOR LYMPHEDEMA: Primary | ICD-10-CM

## 2021-12-29 PROCEDURE — 93702 BIS XTRACELL FLUID ANALYSIS: CPT

## 2021-12-29 PROCEDURE — 97535 SELF CARE MNGMENT TRAINING: CPT

## 2022-01-06 ENCOUNTER — OFFICE VISIT (OUTPATIENT)
Dept: PLASTIC SURGERY | Facility: CLINIC | Age: 62
End: 2022-01-06

## 2022-01-06 VITALS
DIASTOLIC BLOOD PRESSURE: 89 MMHG | BODY MASS INDEX: 37.21 KG/M2 | HEIGHT: 63 IN | WEIGHT: 210 LBS | OXYGEN SATURATION: 96 % | TEMPERATURE: 98.4 F | SYSTOLIC BLOOD PRESSURE: 132 MMHG | HEART RATE: 79 BPM

## 2022-01-06 DIAGNOSIS — Z09 POSTOPERATIVE FOLLOW-UP: Primary | ICD-10-CM

## 2022-01-06 PROCEDURE — 99024 POSTOP FOLLOW-UP VISIT: CPT | Performed by: SURGERY

## 2022-01-06 RX ORDER — CLINDAMYCIN HYDROCHLORIDE 150 MG/1
150 CAPSULE ORAL 3 TIMES DAILY
Qty: 30 CAPSULE | Refills: 0 | Status: SHIPPED | OUTPATIENT
Start: 2022-01-06 | End: 2022-04-12

## 2022-01-11 ENCOUNTER — OFFICE VISIT (OUTPATIENT)
Dept: ONCOLOGY | Facility: HOSPITAL | Age: 62
End: 2022-01-11

## 2022-01-11 VITALS
WEIGHT: 208.34 LBS | BODY MASS INDEX: 36.9 KG/M2 | RESPIRATION RATE: 16 BRPM | HEART RATE: 80 BPM | DIASTOLIC BLOOD PRESSURE: 84 MMHG | TEMPERATURE: 97 F | OXYGEN SATURATION: 100 % | SYSTOLIC BLOOD PRESSURE: 146 MMHG

## 2022-01-11 DIAGNOSIS — C50.412 MALIGNANT NEOPLASM OF UPPER-OUTER QUADRANT OF LEFT BREAST IN FEMALE, ESTROGEN RECEPTOR POSITIVE: ICD-10-CM

## 2022-01-11 DIAGNOSIS — Z17.0 MALIGNANT NEOPLASM OF UPPER-OUTER QUADRANT OF LEFT BREAST IN FEMALE, ESTROGEN RECEPTOR POSITIVE: ICD-10-CM

## 2022-01-11 PROCEDURE — 99214 OFFICE O/P EST MOD 30 MIN: CPT | Performed by: INTERNAL MEDICINE

## 2022-01-11 PROCEDURE — G0463 HOSPITAL OUTPT CLINIC VISIT: HCPCS | Performed by: INTERNAL MEDICINE

## 2022-01-11 RX ORDER — ANASTROZOLE 1 MG/1
1 TABLET ORAL DAILY
Qty: 30 TABLET | Refills: 1 | Status: SHIPPED | OUTPATIENT
Start: 2022-01-11 | End: 2022-01-31 | Stop reason: SDUPTHER

## 2022-01-11 NOTE — PROGRESS NOTES
Patient  Margy OTTO [facility] HEMATOLOGY & ONCOLOGY    Chief Complaint  Breast Cancer (-fu)    Referring Provider: CECI Hoskins  PCP: Mariangel Anne APRN    Subjective          Oncology/Hematology History Overview Note     ER+ Left Breast Cancer:    Diagnostic mammogram/US 9/22/21: Heterogeneous ill-defined hypoechoic focus at the 12 o'clock position of the subareolar region of the left breast    US guided biopsy: Invasive ductal carcinoma with tubular features, grade 1, largest focus 7 mm, ER positive (100%, strong), WI positive (90%, strong), HER-2 negative (score 1+), Ki-67 5%, associated with ADH     Malignant neoplasm of left breast in female, estrogen receptor positive (HCC)   10/5/2021 Initial Diagnosis    Malignant neoplasm of left breast in female, estrogen receptor positive (HCC)         History of Present Illness  Patient came in today with her daughter to discuss a plan of care.  I explained that I spoke with our pathologist, Dr. Mishra, and worked out the specifics regarding her breast tumors.  She does not need chemotherpy since the 4mm tumor was high grade and the larger tumor was low grade with an oncotype score of 18. I do recommend adjuvant endocrine therapy.  We discussed the risks and benefits.     Review of Systems   Constitutional: Negative for appetite change, diaphoresis, fatigue, fever, unexpected weight gain and unexpected weight loss.   HENT: Negative for hearing loss, mouth sores, sore throat, swollen glands, trouble swallowing and voice change.    Eyes: Negative for blurred vision.   Respiratory: Negative for cough, shortness of breath and wheezing.    Cardiovascular: Negative for chest pain and palpitations.   Gastrointestinal: Negative for abdominal pain, blood in stool, constipation, diarrhea, nausea and vomiting.   Endocrine: Negative for cold intolerance and heat intolerance.   Genitourinary: Negative for difficulty urinating,  dysuria, frequency, hematuria and urinary incontinence.   Musculoskeletal: Negative for arthralgias, back pain and myalgias.   Skin: Negative for rash, skin lesions and wound.   Neurological: Negative for dizziness, seizures, weakness, numbness and headache.   Hematological: Does not bruise/bleed easily.   Psychiatric/Behavioral: Negative for depressed mood. The patient is not nervous/anxious.    All other systems reviewed and are negative.      Past Medical History:   Diagnosis Date   • Cancer (HCC)     BREAST   • GERD (gastroesophageal reflux disease)    • High blood pressure    • Hyperlipidemia    • Mood disorder (HCC)    • Neoplasm of left breast    • S/P bilateral mastectomy 12/8/2021   • Status post bilateral breast reconstruction with implant placement 12/8/2021     Past Surgical History:   Procedure Laterality Date   • ABDOMINAL HYSTERECTOMY     • BREAST AUGMENTATION Bilateral 12/7/2021    Procedure: BILATERAL BREAST RECONSTRUCTION WITH IMPLANTS, IMPLANT OF BIOLOGICAL MESH,  USE OF SPY, AND BILATERAL CHEST LIPOSUCTION;  Surgeon: Neena Pires MD;  [place] [facility] [place] [facility] [place]  Service: Plastics;  Laterality: Bilateral;   • BREAST BIOPSY Left 10/26/2021    Procedure: LEFT BREAST LUMPECTOMY WITH SENTINEL NODE BIOPSY AND NEEDLE LOCALIZATION;  Surgeon: Ayah Chaparro MD;  Location: [facility] MAIN OR;  Service: General;  Laterality: Left;   • BUNIONECTOMY     • COLONOSCOPY      aprrox 2018    • COLONOSCOPY N/A 8/31/2021    Procedure: COLONOSCOPY WITH INJECTION ORISE/POLYPECTOMY HOT SNARE/ENDO CLIP X3;  Surgeon: David Hernandez MD;  Location: [facility] ENDOSCOPY;  Service: Gastroenterology;  Laterality: N/A;  COLON POLYP   • CYSTOSCOPY     • ENDOSCOPY     • ENDOSCOPY N/A 8/31/2021    Procedure: ESOPHAGOGASTRODUODENOSCOPY WITH BIOPSY;  Surgeon: David Hernandez MD;  Location: [facility] ENDOSCOPY;  Service: Gastroenterology;  Laterality: N/A;  GASTRIC POLYPS/REFLUX ESOPHAGITIS   • LAPAROSCOPIC  CHOLECYSTECTOMY     • MASTECTOMY Bilateral 12/7/2021    Procedure: MASTECTOMY;  Surgeon: Ayah Chaparro MD;  [place] [facility] [place] [facility] [place]  Service: General;  Laterality: Bilateral;   • ROTATOR CUFF REPAIR Right      Social History     Socioeconomic History   • Marital status:    Tobacco Use   • Smoking status: Never Smoker   • Smokeless tobacco: Never Used   Vaping Use   • Vaping Use: Never used   Substance and Sexual Activity   • Alcohol use: Yes     Alcohol/week: 1.0 standard drink     Types: 1 Glasses of wine per week     Comment: OCC   • Drug use: Never   • Sexual activity: Defer     Family History   Problem Relation Age of Onset   • Diabetes Mother    • Diabetes Other    • Colon cancer Neg Hx    • Malig Hyperthermia Neg Hx        Objective   Physical Exam  General: Alert, cooperative, no acute distress, well-appearing  Eyes: Anicteric sclera, PERRLA  Respiratory: normal respiratory effort  Cardiovascular: no lower extremity edema  Skin: Normal tone, no rash, no lesions  Psychiatric: Appropriate affect, intact judgment  Neurologic: No focal sensory or motor deficits, normal cognition   Musculoskeletal: Normal muscle strength and tone  Extremities: No clubbing, cyanosis, or deformities    Vitals:    01/11/22 1406   BP: 146/84   Pulse: 80   Resp: 16   Temp: 97 °F (36.1 °C)   SpO2: 100%   Weight: 94.5 kg (208 lb 5.4 oz)   PainSc: 0-No pain     ECOG score: 0         PHQ-9 Total Score:         Result Review :   The following data was reviewed by: Mariela Watson MD PhD on 01/11/2022:  Lab Results   Component Value Date    HGB 13.1 06/16/2021    HCT 40.2 06/16/2021    MCV 91.0 06/16/2021     06/16/2021    WBC 3.80 (L) 06/16/2021    NEUTROABS 2.00 06/16/2021    LYMPHSABS 1.28 06/16/2021    MONOSABS 0.31 06/16/2021    EOSABS 0.15 06/16/2021    BASOSABS 0.02 06/16/2021     Lab Results   Component Value Date    BUN 15 06/17/2020    CREATININE 1.00 06/29/2021     06/17/2020    K 4.0 06/17/2020      06/17/2020    CO2 26 06/17/2020    CALCIUM 9.5 06/17/2020    PROTEINTOT 7.8 06/17/2020    ALBUMIN 4.4 06/17/2020    BILITOT 0.3 06/17/2020    ALKPHOS 103 06/17/2020    AST 38 06/17/2020    ALT 30 06/17/2020          Assessment and Plan    Diagnoses and all orders for this visit:    1. Malignant neoplasm of upper-outer quadrant of left breast in female, estrogen receptor positive (HCC)    Other orders  -     anastrozole (ARIMIDEX) 1 MG tablet; Take 1 tablet by mouth Daily.  Dispense: 30 tablet; Refill: 1      Patient had 2 primary breast cancers of the left breast.  She underwent bilateral mastectomy.  Oncotype testing on the larger nodule reveals a negative score of 18.  The patient does not need chemotherapy.  I do recommend 5 years of adjuvant endocrine therapy.  I will send a prescription for anastrozole and follow-up with the patient in 3 weeks to discuss any associated side effects or toxicity.  The patient has a vacation planned in about 4 weeks.    Patient was given instructions and counseling regarding her condition or for health maintenance advice. Please see specific information pulled into the AVS if appropriate.     Mariela Watson MD PhD    1/11/2022

## 2022-01-14 RX ORDER — ANASTROZOLE 1 MG/1
1 TABLET ORAL DAILY
Qty: 30 TABLET | Refills: 1 | Status: CANCELLED | OUTPATIENT
Start: 2022-01-14

## 2022-01-14 NOTE — TELEPHONE ENCOUNTER
Caller: Margy Jimenez    Relationship: Self    Best call back number: [phone]    Requested Prescriptions:   Requested Prescriptions     Pending Prescriptions Disp Refills   • anastrozole (ARIMIDEX) 1 MG tablet 30 tablet 1     Sig: Take 1 tablet by mouth Daily.        Pharmacy where request should be sent: [facility]/PHARMACY #[id number] - [place], [place] - [address] [facility] - [phone]  - [phone] FX     Additional details provided by patient: PATIENT STATES THAT SHE WAS IN THE OFFICE EARLIER THIS WEEK AND HAS BEEN EXPECTING A SCRIPT TO BE CALLED IN FOR THIS NEW MEDICATION.  TO DATE, [facility] STATES THEY HAVE NOT REC'D THIS.

## 2022-01-20 ENCOUNTER — TELEPHONE (OUTPATIENT)
Dept: PLASTIC SURGERY | Facility: CLINIC | Age: 62
End: 2022-01-20

## 2022-01-20 NOTE — TELEPHONE ENCOUNTER
Patient called requesting extra time off due to previously scheduled trip.  Requested off until 2/13/22.  FMLA had been completed with expected return to work date of 1/18/2022.  I spoke to patient regarding and she states that she was verbally approved for a month at her 1/6/2022 appointment.  States she is having pain in right breast and numbness in right axilla.  I spoke to Dr. Pires and she states that the pain and numbness are normal but we would be happy to see patient and evaluate in clinic.  Offered an appointment and she wants to wait and see how she does over weekend.  States she has been doing more physically with her arms and that may be cause of pain. Will extend time off until 2/6/2022 at patient request for additional time to heal.  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  Paperwork updated and faxed today.

## 2022-01-21 ENCOUNTER — HOSPITAL ENCOUNTER (OUTPATIENT)
Dept: OCCUPATIONAL THERAPY | Facility: HOSPITAL | Age: 62
Setting detail: THERAPIES SERIES
Discharge: HOME | End: 2022-01-21

## 2022-01-21 DIAGNOSIS — I89.9 DISORDERS OF LYMPH NODE AND LYMPHATICS: ICD-10-CM

## 2022-01-21 DIAGNOSIS — Z17.0 MALIGNANT NEOPLASM OF LEFT BREAST IN FEMALE, ESTROGEN RECEPTOR POSITIVE, UNSPECIFIED SITE OF BREAST: ICD-10-CM

## 2022-01-21 DIAGNOSIS — L90.5 SCAR CONDITION AND FIBROSIS OF SKIN: ICD-10-CM

## 2022-01-21 DIAGNOSIS — C50.912 MALIGNANT NEOPLASM OF LEFT BREAST IN FEMALE, ESTROGEN RECEPTOR POSITIVE, UNSPECIFIED SITE OF BREAST: ICD-10-CM

## 2022-01-21 DIAGNOSIS — Z91.89 AT RISK FOR LYMPHEDEMA: Primary | ICD-10-CM

## 2022-01-21 DIAGNOSIS — R52 PAIN: ICD-10-CM

## 2022-01-21 PROCEDURE — 97140 MANUAL THERAPY 1/> REGIONS: CPT

## 2022-01-21 NOTE — THERAPY TREATMENT NOTE
Outpatient Occupational Therapy Lymphedema Treatment Note   [place]     Patient Name: Margy Jimenez  : 1960  MRN: [id number]  Today's Date: 2022      Visit Date: 2022    Patient Active Problem List   Diagnosis   • High blood pressure   • Mood disorder (HCC)   • Anxiety   • Biceps tendinitis, right   • Fatigue   • GERD (gastroesophageal reflux disease)   • Hyperlipidemia   • Migraine   • Nontraumatic incomplete tear of right rotator cuff   • Pain and swelling of right shoulder   • S/P rotator cuff repair   • Shoulder impingement, right   • Hypertension   • Depression   • Gastroesophageal reflux disease   • Heartburn   • Malignant neoplasm of left breast in female, estrogen receptor positive (HCC)   • S/P bilateral mastectomy   • Status post bilateral breast reconstruction with implant placement   • Postoperative follow-up        Past Medical History:   Diagnosis Date   • Cancer (HCC)     BREAST   • GERD (gastroesophageal reflux disease)    • High blood pressure    • Hyperlipidemia    • Mood disorder (HCC)    • Neoplasm of left breast    • S/P bilateral mastectomy 2021   • Status post bilateral breast reconstruction with implant placement 2021        Past Surgical History:   Procedure Laterality Date   • ABDOMINAL HYSTERECTOMY     • BREAST AUGMENTATION Bilateral 2021    Procedure: BILATERAL BREAST RECONSTRUCTION WITH IMPLANTS, IMPLANT OF BIOLOGICAL MESH,  USE OF SPY, AND BILATERAL CHEST LIPOSUCTION;  Surgeon: Neena Pires MD;  Location: [facility];  Service: Plastics;  Laterality: Bilateral;   • BREAST BIOPSY Left 10/26/2021    Procedure: LEFT BREAST LUMPECTOMY WITH SENTINEL NODE BIOPSY AND NEEDLE LOCALIZATION;  Surgeon: Ayah Chaparro MD;  Location: [facility]  Service: General;  Laterality: Left;   • BUNIONECTOMY     • COLONOSCOPY      2018    • COLONOSCOPY N/A 2021    Procedure: COLONOSCOPY WITH INJECTION ORISE/POLYPECTOMY HOT  SNARE/ENDO CLIP X3;  Surgeon: David Hernandez MD;  Location: [facility] ENDOSCOPY;  Service: Gastroenterology;  Laterality: N/A;  COLON POLYP   • CYSTOSCOPY     • ENDOSCOPY     • ENDOSCOPY N/A 8/31/2021    Procedure: ESOPHAGOGASTRODUODENOSCOPY WITH BIOPSY;  Surgeon: David Hernandez MD;  Location: [facility] ENDOSCOPY;  Service: Gastroenterology;  Laterality: N/A;  GASTRIC POLYPS/REFLUX ESOPHAGITIS   • LAPAROSCOPIC CHOLECYSTECTOMY     • MASTECTOMY Bilateral 12/7/2021    Procedure: MASTECTOMY;  Surgeon: Ayah Chaparro MD;  Location: [facility] OR [facility];  Service: General;  Laterality: Bilateral;   • ROTATOR CUFF REPAIR Right          Visit Dx:      ICD-10-CM ICD-9-CM   1. At risk for lymphedema  Z91.89 V49.89   2. Malignant neoplasm of left breast in female, estrogen receptor positive, unspecified site of breast (HCC)  C50.912 174.9    Z17.0 V86.0   3. Pain  R52 780.96   4. Scar condition and fibrosis of skin  L90.5 709.2   5. Disorders of lymph node and lymphatics  I89.9 785.6        Lymphedema     Row Name 01/21/22 0900             Subjective Pain    Able to rate subjective pain? yes  -CH      Pre-Treatment Pain Level 2  -CH      Post-Treatment Pain Level 0  -CH      Subjective Pain Comment Pt. states that approximately a week ago her pain was much more significant in that area. 10/10  -CH              Subjective Comments    Subjective Comments Pt. states she did have to  on antibiotics for a short period due to irritation from a suture used in her wound closure.  -              Lymphedema Assessment    Lymphedema Classification LUE:; at risk/stage 0; stage 1 (Spontaneously Reversible)  -CH      Lymph Nodes Removed # 3  -CH      Positive Lymph Nodes # 0  -CH              Manual Lymphatic Drainage    Manual Lymphatic Drainage opened regional lymph nodes; extremity treatment  -CH      Opened Regional Lymph Nodes axillary; ribs  Peristernal  -CH      Extremity Treatment extremity treatment focus on  -CH      Manual  "Lymphatic Drainage Comments POri protocol  -      Manual Therapy OT utilized myofascial release technique and deep friction massage along lymphatic vessel to release lymphatic chording x2 with complete resolution of pain.  -CH            User Key  (r) = Recorded By, (t) = Taken By, (c) = Cosigned By    Initials Name Provider Type    Lenore Mendieta OT Occupational Therapist                         OT Assessment/Plan     Row Name 01/21/22 0943          OT Assessment    Functional Limitations Performance in self-care ADL; Other (comment)  at risk for lymphedema  -CH     Impairments Impaired lymphatic circulation; Pain  -CH     Assessment Comments Pt. will benefit from continued skilled OT to evaluate ongoing lymphatic functioning to prevent advancement in lymphedema staging. Cont. POC  -CH     OT Rehab Potential Good  -CH     Patient/caregiver participated in establishment of treatment plan and goals Yes  -CH     Patient would benefit from skilled therapy intervention Yes  -CH            OT Plan    OT Frequency --  see duration  -CH     Predicted Duration of Therapy Intervention (OT) Pt. to re-evaluated 3 weeks post-surgery, 3 weeks post XRT, every 3 months from baseline for years 1-3 and every 6 months years 4 and 5 .  -CH           User Key  (r) = Recorded By, (t) = Taken By, (c) = Cosigned By    Initials Name Provider Type    Lenore Mendieta OT Occupational Therapist                    Manual Rx (last 36 hours)     Manual Treatments     Row Name 01/21/22 0945             Total Minutes    72500 - OT Manual Therapy Minutes 33  -CH            User Key  (r) = Recorded By, (t) = Taken By, (c) = Cosigned By    Initials Name Provider Type    Lenore Mendieta OT Occupational Therapist                  Therapy Education  Education Details: OT recommended completing lymphatic vessel flossing technique (\"ByeBye's\") for 2 more weeks but if she attained resolution of symptoms then she can discontinue those and " revert to stretching protocol.  OT to transition patient to moderate restistive UB exercise protocol after next visit when restrictions of wb and ROM should be lifted from recon surgery.  Given: Symptoms/condition management  Program: Reinforced  How Provided: Demonstration, Verbal  Provided to: Patient  Level of Understanding: Verbalized                Time Calculation:   Timed Charges  95737 - OT Manual Therapy Minutes: 33  Total Minutes  Timed Charges Total Minutes: 33   Total Minutes: 33     Therapy Charges for Today     Code Description Service Date Service Provider Modifiers Qty    [id number]  OT MANUAL THERAPY EA 15 MIN 1/21/2022 Lenore Rider OT GO 2                      Lenore Rider OT  1/21/2022

## 2022-01-24 NOTE — THERAPY RE-EVALUATION
----- Message from LEEANNA Phililps sent at 1/24/2022  8:38 AM CST -----  Call with abnormal Lipid panel   High    High TG's  257  Plan:  Lipitor 10 mg daily - sent to [facility]            Repeat lipid panel in 1 month with medication added   If no negative side effects on Lipitor and significantly improved lipid panel will then recommend we continue with this medication    Outpatient Occupational Therapy Lymphedema Re-Evaluation   [place]     Patient Name: Margy Jimenez  : 1960  MRN: [id number]  Today's Date: 2021      Visit Date: 2021    Patient Active Problem List   Diagnosis   • High blood pressure   • Mood disorder (HCC)   • Anxiety   • Biceps tendinitis, right   • Fatigue   • GERD (gastroesophageal reflux disease)   • Hyperlipidemia   • Migraine   • Nontraumatic incomplete tear of right rotator cuff   • Pain and swelling of right shoulder   • S/P rotator cuff repair   • Shoulder impingement, right   • Hypertension   • Depression   • Gastroesophageal reflux disease   • Heartburn   • Malignant neoplasm of left breast in female, estrogen receptor positive (HCC)   • S/P bilateral mastectomy   • Status post bilateral breast reconstruction with implant placement        Past Medical History:   Diagnosis Date   • Cancer (HCC)     BREAST   • GERD (gastroesophageal reflux disease)    • High blood pressure    • Hyperlipidemia    • Mood disorder (HCC)    • Neoplasm of left breast    • S/P bilateral mastectomy 2021   • Status post bilateral breast reconstruction with implant placement 2021        Past Surgical History:   Procedure Laterality Date   • ABDOMINAL HYSTERECTOMY     • BREAST AUGMENTATION Bilateral 2021    Procedure: BILATERAL BREAST RECONSTRUCTION WITH IMPLANTS, IMPLANT OF BIOLOGICAL MESH,  USE OF SPY, AND BILATERAL CHEST LIPOSUCTION;  Surgeon: eNena Pires MD;  Location: [facility];  Service: Plastics;  Laterality: Bilateral;   • BREAST BIOPSY Left 10/26/2021    Procedure: LEFT BREAST LUMPECTOMY WITH SENTINEL NODE BIOPSY AND NEEDLE LOCALIZATION;  Surgeon: Ayah Chaparro MD;  Location: [facility];  Service: General;  Laterality: Left;   • BUNIONECTOMY     • COLONOSCOPY      2018    • COLONOSCOPY N/A 2021    Procedure: COLONOSCOPY WITH INJECTION ORISE/POLYPECTOMY HOT SNARE/ENDO CLIP X3;  Surgeon: David  "David Gale MD;  Location: [facility] ENDOSCOPY;  Service: Gastroenterology;  Laterality: N/A;  COLON POLYP   • CYSTOSCOPY     • ENDOSCOPY     • ENDOSCOPY N/A 8/31/2021    Procedure: ESOPHAGOGASTRODUODENOSCOPY WITH BIOPSY;  Surgeon: David Hernandez MD;  Location: [facility] ENDOSCOPY;  Service: Gastroenterology;  Laterality: N/A;  GASTRIC POLYPS/REFLUX ESOPHAGITIS   • LAPAROSCOPIC CHOLECYSTECTOMY     • MASTECTOMY Bilateral 12/7/2021    Procedure: MASTECTOMY;  Surgeon: Ayah Chaparro MD;  Location: [facility];  Service: General;  Laterality: Bilateral;   • ROTATOR CUFF REPAIR Right          Visit Dx:     ICD-10-CM ICD-9-CM   1. At risk for lymphedema  Z91.89 V49.89   2. Malignant neoplasm of left breast in female, estrogen receptor positive, unspecified site of breast (HCC)  C50.912 174.9    Z17.0 V86.0   3. Encounter for preoperative assessment  Z01.818 V72.84            Lymphedema     Row Name 12/29/21 1400             Subjective Pain    Able to rate subjective pain? yes  -CH      Pre-Treatment Pain Level 0  -CH      Post-Treatment Pain Level 0  -CH              Lymphedema Assessment    Lymphedema Classification LUE:; at risk/stage 0  -CH      Lymphedema Cancer Related Sx bilateral; simple mastectomy; sentinel node biopsy  -CH      Lymph Nodes Removed # 4  -CH      Positive Lymph Nodes # 0  -CH      Radiation Therapy Received no  -CH              Physical Concerns    The amount of pain associated with my lymphedema is: 0  -CH      The amount of limb heaviness associated with my lymphedema is: 0  -CH      The amount of skin tightness associated with my lymphedema is: 0  -CH      The size of my swollen limb(s) seems: 0  -CH      Lymphedema affects the movement of my swollen limb(s): 0  -CH      The strength in my swollen limb(s) is: 0  -CH              Psychosocial Concerns    Lymphedema affects my body image (i.e., \"how I think I look\"). 0  -CH      Lymphedema affects my socializing with others. 0  -CH      " "Lymphedema affects my intimate relations with spouse or partner (rate 0 if not applicable 0  -CH      Lymphedema \"gets me down\" (i.e., depression, frustration, or anger) 0  -CH      I must rely on others for help due to my lymphedema. 0  -CH      I know what to do to manage my lymphedema 2  -CH              Functional Concerns    Lymphedema affects my ability to perform self-care activities (i.e. eating, dressing, hygiene) 0  -CH      Lymphedema affects my ability to perform routine home or work-related activities. 0  -CH      Lymphedema affects my performance of preferred leisure activities. 0  -CH      Lymphedema affects proper fit of clothing/shoes 0  -CH      Lymphedema affects my sleep 0  -CH              Posture/Observations    Posture- WNL Posture is WNL  -CH              General ROM    GENERAL ROM COMMENTS NT this date because of precautions  -CH              MMT (Manual Muscle Testing)    General MMT Comments NT this date because of precaution  -CH              Skin Changes/Observations    Location/Assessment Upper Quadrant  -CH      Upper Quadrant Conditions bilateral:; intact; scab(s)  -CH      Upper Quadrant Color/Pigment right:; erythema; left:; normal  -CH      Skin Observations Comment Pt. is noted wtih mild erythema at the distal portion of the incions on the R breast.  No heat variant noted. No pain noted. Pt. is noted with lymphatic sclerosis x4 on the R axilla with pain and discomfort noted.  -CH              Lymphedema Measurements    Measurement Type(s) Circumferential  -CH              L-Dex Bioimpedence Screening    L-Dex Measurement Extremity LUE  -CH      L-Dex Patient Position Standing  -CH      L-Dex UE Dominate Side Right  -CH      L-Dex UE At Risk Side Left  -CH      L-Dex UE Pre Surgical Value No  -CH      L-Dex UE Score 2.2  -CH      L-Dex UE Baseline Score 2.2  -CH      L-Dex UE Value Change 0  -CH      $ L-Dex Charge yes  -CH              Lymphedema Life Impact Scale Totals    A.  " Total Q1 - Q17 (Do not include Q18) 2  -CH      B.  Total number of questions answered (Q1-Q17) 17  -CH      C. Divide A by B 0.12  -CH      D. Multiple C by 25 3  -CH            User Key  (r) = Recorded By, (t) = Taken By, (c) = Cosigned By    Initials Name Provider Type    CH Lenore Rider OT Occupational Therapist                            Therapy Education  Education Details: Pt. educated on the lymphededema prevention/stoplight to recovery information. OT reviewed self MLD with protection of movment of the implant.  OT also educated patient on the benefit of moderate resistive UB exercises to reduce risk of developing lymphedema once the activity restriction have been lifted by her plastic surgeon. Pt. also educated on the benefit of moderate exercise and it's benefit on reducing reoccurance of cancer.  Given: HEP, Symptoms/condition management  Program: New, Reinforced  How Provided: Verbal, Demonstration, Written  Provided to: Patient  Level of Understanding: Teach back education performed, Verbalized, Demonstrated  37042 - OT Self Care/Mgmt Minutes: 55    OT reviewed with patient the signs and symptoms of lymph exasperation and emphasized the importance to contact OT if she does start to experience any of those symptoms as early intervention is key to stop progression through stages of lymphedema.  OT reference the Lymphedema Surveillance program handout reviewing signs and symptoms as well as appropriate action to take during a lymph exacerbation.  Rehabilitation Oncology: July 2019 - Volume 37 - Issue 3 - p 122-127 doi: [id number].  Patient provided with education on a simple self-manual lymphatic drainage technique.  Handouts provided.   Patient exhibited good return demonstration of skill.  Patient will benefit from continued education to ensure carryover skill.       OT Goals     Row Name 12/29/21 1633          Time Calculation    OT Goal Re-Cert Due Date 01/28/22  -            User Key  (r) = Recorded By, (t) = Taken By, (c) = Cosigned By    Initials Name Provider Type    Lenore Mendieta, OT Occupational Therapist              GOALS:  1. Post Breast Surgery Care/at risk for Lymphedema  LTG 1: 90 days:  As an indicator of no exacerbation of lymphedema staging, the patient will present with a total lymphatic volume less than 5% from  baseline.              STATUS: Met:discontinue    LTG 1b: 90 days:  As an indicator of no exacerbation of lymphedema staging, the patient will present with an L-Dex score less than 7 points from preoperative baseline.   STATUS: New   STG 1a:   30 days: To prevent exacerbation of mixed edema to lymphedema, patient will utilize the 2 postsurgical compression garments daily.                 STATUS: Met:  Discontinue  STG 1b: 30 days: Patient will be independent with self-manual lymphatic massage.               STATUS: Met: ongoing  STG 1c: 30 days:  Patient will be independent with identification of signs and symptoms of lymphedema exasperation per stoplight to recovery education handout.              STATUS: Partially met: ongoing  STG 1 d: 30 days: Patient will be independent with HEP to prevent advancement in lymphedema staging.              STATUS: Not Met: ongoing  TREATMENT:  Self Care/ADL retraining, Therapeutic Activity, Neuromuscular Re-education, Therapeutic Exercise, Bioimpedence Fluid Analysis, [facility] garement [address] [facility], Orthotic Management and training,  and Manual Therapy.     OT Assessment/Plan     Row Name 12/29/21 1629          OT Assessment    Functional Limitations Performance in self-care ADL; Other (comment)  -CH     Impairments Impaired lymphatic circulation  -     Assessment Comments Pt. is demonstrating normalized lymphatic funcitoning this date. Pt. was observed with lymphatic sclerosis in the R axilla that was painful.  Pt. will benefit from continued skilled OT intervention to continue to evaluate  lymphatic functioning to prevent advancement in lymphedema staging. Pt. may also benefit from skiled OT intervention to address lymphatic sclerosis to improve functional use of B UE in self care task such as reaching in a closet.  -     OT Rehab Potential Excellent  -     Patient/caregiver participated in establishment of treatment plan and goals Yes  -     Patient would benefit from skilled therapy intervention Yes  -CH            OT Plan    OT Frequency Other (comment)  see duration  -     Predicted Duration of Therapy Intervention (OT) Pt. to re-evaluated 3 weeks post-surgery, 3 weeks post XRT, every 3 months from baseline for years 1-3 and every 6 months years 4 and 5  -     Planned CPT's? OT RE-EVAL: 87083; OT NEUROMUSC RE EDUCATION EA 15 MIN: 13334; OT SELF CARE/MGMT/TRAIN 15 MIN: 38685; OT HOT/COLD PACK; OT ULTRASOUND EA 15 MIN: 43868; OT MANUAL THERAPY EA 15 MIN: 97337; OT BIS XTRACELL FLUID ANALYSIS: 81482; OT ORTHOTIC MGMT/TRAIN EA 15 MIN: 34427; OT ORTHO/PROSTHET CHECKOUT EA 15 MIN: 41737  [facility]     Planned Therapy Interventions (Optional Details) prosthetic fitting/training; patient/family education; postural re-education; ROM (Range of Motion); orthotic fitting/training; manual therapy techniques; home exercise program; strengthening; stretching  -           User Key  (r) = Recorded By, (t) = Taken By, (c) = Cosigned By    Initials Name Provider Type     Lenore Rider OT Occupational Therapist                          Time Calculation:   Timed Charges  47383 - OT Self Care/Mgmt Minutes: 55  Total Minutes  Timed Charges Total Minutes: 55   Total Minutes: 55     Therapy Charges for Today     Code Description Service Date Service Provider Modifiers Qty    93149349653 HC PT BIS XTRACELL FLUID ANALYSIS 12/29/2021 Lenore Rider OT  1    [id number] HC OT SELF CARE/MGMT/TRAIN EA 15 MIN 12/29/2021 Lenore Rider OT GO 4                    Lenore Rider OT  12/29/2021

## 2022-01-25 ENCOUNTER — APPOINTMENT (OUTPATIENT)
Dept: OCCUPATIONAL THERAPY | Facility: HOSPITAL | Age: 62
End: 2022-01-25

## 2022-01-31 ENCOUNTER — OFFICE VISIT (OUTPATIENT)
Dept: ONCOLOGY | Facility: HOSPITAL | Age: 62
End: 2022-01-31

## 2022-01-31 ENCOUNTER — NURSE NAVIGATOR (OUTPATIENT)
Dept: ONCOLOGY | Facility: HOSPITAL | Age: 62
End: 2022-01-31

## 2022-01-31 VITALS
TEMPERATURE: 98.3 F | HEART RATE: 78 BPM | RESPIRATION RATE: 18 BRPM | WEIGHT: 208.34 LBS | OXYGEN SATURATION: 98 % | DIASTOLIC BLOOD PRESSURE: 96 MMHG | BODY MASS INDEX: 36.9 KG/M2 | SYSTOLIC BLOOD PRESSURE: 133 MMHG

## 2022-01-31 DIAGNOSIS — Z17.0 MALIGNANT NEOPLASM OF UPPER-OUTER QUADRANT OF LEFT BREAST IN FEMALE, ESTROGEN RECEPTOR POSITIVE: Primary | ICD-10-CM

## 2022-01-31 DIAGNOSIS — C50.412 MALIGNANT NEOPLASM OF UPPER-OUTER QUADRANT OF LEFT BREAST IN FEMALE, ESTROGEN RECEPTOR POSITIVE: Primary | ICD-10-CM

## 2022-01-31 DIAGNOSIS — Z78.0 POST-MENOPAUSAL: ICD-10-CM

## 2022-01-31 PROCEDURE — G0463 HOSPITAL OUTPT CLINIC VISIT: HCPCS | Performed by: INTERNAL MEDICINE

## 2022-01-31 PROCEDURE — 99214 OFFICE O/P EST MOD 30 MIN: CPT | Performed by: INTERNAL MEDICINE

## 2022-01-31 RX ORDER — ANASTROZOLE 1 MG/1
1 TABLET ORAL DAILY
Qty: 90 TABLET | Refills: 1 | Status: SHIPPED | OUTPATIENT
Start: 2022-01-31 | End: 2022-08-03

## 2022-02-01 ENCOUNTER — APPOINTMENT (OUTPATIENT)
Dept: ONCOLOGY | Facility: HOSPITAL | Age: 62
End: 2022-02-01

## 2022-02-23 ENCOUNTER — NURSE NAVIGATOR (OUTPATIENT)
Dept: ONCOLOGY | Facility: HOSPITAL | Age: 62
End: 2022-02-23

## 2022-02-23 ENCOUNTER — OFFICE VISIT (OUTPATIENT)
Dept: ONCOLOGY | Facility: HOSPITAL | Age: 62
End: 2022-02-23

## 2022-02-23 VITALS
HEART RATE: 66 BPM | TEMPERATURE: 96.8 F | DIASTOLIC BLOOD PRESSURE: 84 MMHG | SYSTOLIC BLOOD PRESSURE: 149 MMHG | WEIGHT: 210.32 LBS | BODY MASS INDEX: 37.26 KG/M2 | RESPIRATION RATE: 18 BRPM | OXYGEN SATURATION: 100 %

## 2022-02-23 DIAGNOSIS — I10 HYPERTENSION, UNSPECIFIED TYPE: ICD-10-CM

## 2022-02-23 DIAGNOSIS — E78.5 HYPERLIPIDEMIA, UNSPECIFIED HYPERLIPIDEMIA TYPE: ICD-10-CM

## 2022-02-23 DIAGNOSIS — C50.412 MALIGNANT NEOPLASM OF UPPER-OUTER QUADRANT OF LEFT BREAST IN FEMALE, ESTROGEN RECEPTOR POSITIVE: Primary | ICD-10-CM

## 2022-02-23 DIAGNOSIS — Z17.0 MALIGNANT NEOPLASM OF UPPER-OUTER QUADRANT OF LEFT BREAST IN FEMALE, ESTROGEN RECEPTOR POSITIVE: Primary | ICD-10-CM

## 2022-02-23 DIAGNOSIS — Z90.13 S/P BILATERAL MASTECTOMY: ICD-10-CM

## 2022-02-23 PROCEDURE — G0463 HOSPITAL OUTPT CLINIC VISIT: HCPCS | Performed by: NURSE PRACTITIONER

## 2022-02-23 PROCEDURE — 99215 OFFICE O/P EST HI 40 MIN: CPT | Performed by: NURSE PRACTITIONER

## 2022-02-23 RX ORDER — COVID-19 MOLECULAR TEST ASSAY
KIT MISCELLANEOUS
COMMUNITY
Start: 2022-02-04 | End: 2022-04-12 | Stop reason: SDUPTHER

## 2022-02-23 NOTE — PROGRESS NOTES
MEDICAL ONCOLOGY CANCER SURVIVORSHIP VISIT    Margy Jimenez  [id number]  1960    Chief Complaint: SURVIVORSHIP        History of present illness:  Margy Jimenez is a 61 y.o. year old female who is here today for the Cancer Survivorship visit, see oncology history below.     She has left breast invasive ductal carcinoma, hormone positive, ER +, DE+, Her 2 neg disease with associated ADH. She completed bilateral mastectomies, immediate breast reconstruction with implants per Dr. Pires.     She has been taking Anastrozole since diagnosis and is tolerating well. She has not started Calcium and Vitamin D supplementation and this was discussed with her and she will start. She thinks she may have been told not to take Calcium and will discuss this with her PCP, Dr. Anne. She will start Vitamin D supplementation of 800 IU every day.     We discussed her overall risk of CAD. She has history of hypercholesteremia, hypertension. She has chronic shoulder pain, migraine, mood disorder, anxiety, depression, GERD. She is on several medications for all. We discussed signs and symptoms of MI and CHF.      She follows with her PCP, Dr. Anne for lab work and refills for the chronic disease. She will have a bone density every 2 years and the next bone density is scheduled for April 11, 2022.      We discussed skin safety and sun exposure. She uses sun screen when she plans to spend extended time outside.      We discussed signs and symptoms of breast cancer recurrence. We discussed monthly self breast exams, screening mammograms. We discussed other recurrence signs and symptoms including persistent headaches, persistent cough, or bone or back pain.      We discussed immunization status. She is up to date on all of her immunizations including COVID vaccines and booster and shingles vaccine.      She has had colonoscopy screening in the past on 8/31/21 and endoscopy on 8/31/21. She had an 18 mm polyp in the  transverse colon and mutliple polyps are found on the endoscopy. She is on the recall list for 3 years. We discussed symptoms such as change in bowel habits, loose stool, diarrhea or blood noted in the stool or weight loss.      We discussed plant based diet. Her BMI 37.2. I would encourage and suggest increased moderate to vigorous physical activity with the recommendation of 30 minutes a day at least 5 days a week.      Lymphedema. She follows with PT for exercises and follow up for this. We discussed avoiding blood draws, BP checks and wearing heavy items on this arm. We discussed signs and symptoms of lymphedema including redness, swelling, fluid retention.      She denies any tobacco or former tobacco use. She denies any alcohol use.     She receives gyn exams. History of hystorectomy, but maintain bilateral ovaries intact.      She does not have any issues with anxiety or depression.      We discussed follow up guidelines for breast cancer with Dr. Watson to include generally 1-4 times a year as clinically appropriate for 5 years, then annually. Lab tests per MD order. She should not need any breast imaging since she has had bilateral mastectomy and breast reconstruction,  but she will discuss MRI breast screening with Dr. Watson at the next visit.  Bone density study every 2 years.      Distress meter score: 0.             Cancer History:   Oncology/Hematology History Overview Note     ER+ Left Breast Cancer:    Diagnostic mammogram/US 9/22/21: Heterogeneous ill-defined hypoechoic focus at the 12 o'clock position of the subareolar region of the left breast    US guided biopsy: Invasive ductal carcinoma with tubular features, grade 1, largest focus 7 mm, ER positive (100%, strong), UT positive (90%, strong), HER-2 negative (score 1+), Ki-67 5%, associated with ADH    Left Lumpectomy on 10/26/21: Pathology positive for a tubular carcinoma, inferiorly, associated with DCIS, grade 1, 9mm in size, strongly ER/UT+,  pT1bN0. A second foci was found incidentally and measured 4mm in size at the edge of the margin.    Bilateral Mastectomy on 12/7/21: No malignancy was detected.       Malignant neoplasm of left breast in female, estrogen receptor positive (HCC)   10/5/2021 Initial Diagnosis    Malignant neoplasm of left breast in female, estrogen receptor positive (HCC)         Past Medical History:   Diagnosis Date   • Cancer (HCC)     BREAST   • GERD (gastroesophageal reflux disease)    • High blood pressure    • Hyperlipidemia    • Mood disorder (HCC)    • Neoplasm of left breast    • S/P bilateral mastectomy 12/8/2021   • Status post bilateral breast reconstruction with implant placement 12/8/2021       Past Surgical History:   Procedure Laterality Date   • ABDOMINAL HYSTERECTOMY     • BREAST AUGMENTATION Bilateral 12/7/2021    Procedure: BILATERAL BREAST RECONSTRUCTION WITH IMPLANTS, IMPLANT OF BIOLOGICAL MESH,  USE OF SPY, AND BILATERAL CHEST LIPOSUCTION;  Surgeon: Neena Pires MD;  [place] [facility] [place] [facility]  Service: Plastics;  Laterality: Bilateral;   • BREAST BIOPSY Left 10/26/2021    Procedure: LEFT BREAST LUMPECTOMY WITH SENTINEL NODE BIOPSY AND NEEDLE LOCALIZATION;  Surgeon: Ayah Chaparro MD;  Location: [facility] MAIN OR;  Service: General;  Laterality: Left;   • BUNIONECTOMY     • COLONOSCOPY      aprrox 2018    • COLONOSCOPY N/A 8/31/2021    Procedure: COLONOSCOPY WITH INJECTION ORISE/POLYPECTOMY HOT SNARE/ENDO CLIP X3;  Surgeon: David Hernandez MD;  Location: [facility] ENDOSCOPY;  Service: Gastroenterology;  Laterality: N/A;  COLON POLYP   • CYSTOSCOPY     • ENDOSCOPY     • ENDOSCOPY N/A 8/31/2021    Procedure: ESOPHAGOGASTRODUODENOSCOPY WITH BIOPSY;  Surgeon: David Hernandez MD;  Location: [facility] ENDOSCOPY;  Service: Gastroenterology;  Laterality: N/A;  GASTRIC POLYPS/REFLUX ESOPHAGITIS   • LAPAROSCOPIC CHOLECYSTECTOMY     • MASTECTOMY Bilateral 12/7/2021    Procedure: MASTECTOMY;  Surgeon:  Ayah Chaparro MD;  [place] [facility] [place] [facility];  Service: General;  Laterality: Bilateral;   • ROTATOR CUFF REPAIR Right        MEDICATIONS: The current medication list was reviewed and reconciled.     Allergies:  is allergic to hydromorphone, hydromorphone hcl, and cephalexin.    Family History   Problem Relation Age of Onset   • Diabetes Mother    • Diabetes Other    • Colon cancer Neg Hx    • Malig Hyperthermia Neg Hx          Review of Systems   Constitutional: Negative.    HENT: Negative.    Eyes: Negative.    Respiratory: Negative.    Cardiovascular: Negative.    Gastrointestinal: Negative.    Endocrine: Negative.    Genitourinary: Negative.    Musculoskeletal: Positive for arthralgias and myalgias.   Skin: Negative.    Allergic/Immunologic: Negative.    Neurological: Negative.    Hematological: Negative.    Psychiatric/Behavioral: Negative.        Physical Exam  Vital Signs: /84   Pulse 66   Temp 96.8 °F (36 °C)   Resp 18   Wt 95.4 kg (210 lb 5.1 oz)   SpO2 100%   BMI 37.26 kg/m²    General Appearance:     Neurologic/Psychiatric: A&O x 3, gait steady, appropriate affect   HEENT:  Normocephalic, without obvious abnormality, mucous membranes moist   Neck: Supple, symmetrical, trachea midline, no adenopathy;  No thyromegaly, masses, or tenderness   Back:   Symmetric, no curvature, ROM normal, no CVA tenderness   Lungs:   Clear to auscultation bilaterally; respirations regular, even, and unlabored bilaterally   Heart:  Regular rate and rhythm, no murmurs appreciated   Abdomen:   Soft, non tender. BS active.   Lymph nodes: No cervical or supraclavicular adenopathy noted   Extremities: Normal, atraumatic; no clubbing, cyanosis, or edema      ECOG Performance Status: (0) Fully Active - Able to Carry On All Pre-disease Performance Without Restriction        Assessment and Plan:  Diagnoses and all orders for this visit:    1. Malignant neoplasm of upper-outer quadrant of left breast in female, estrogen  receptor positive (HCC) (Primary)    2. S/P bilateral mastectomy    3. Hypertension, unspecified type    4. Hyperlipidemia, unspecified hyperlipidemia type    Other orders  -     SCANNED PATHOLOGY        The patient and I have reviewed thier personal Survivorship Care Plan in detail. We discussed diagnosis, pathology, histology, all treatments, and ongoing surveillance recommendations. All questions were answered to her satisfaction. The patient is in agreement with our plan for ongoing surveillance as outlined in the plan. A copy of this document was provided at the completion of our visit.  A copy has also been sent to the patient's primary care provider.    This was a 45} minute visit with 45 minutes spent in direct face to face review of the Survivorship Care Plan.    Return to clinic on March 29, 2022 with Dr. Watson for ongoing cancer surveillance.      CECI [place]

## 2022-02-23 NOTE — PROGRESS NOTES
Nurse Navigator Evaluation     2022  Margy Jimenez      Care Plan Delivery Method: Patient Visit    Care Plan Delivered to PCP Via: Fax    Referrals: Dietician    Education Material Provided on: Care Plan, Diagnosis, Exercise, Nutrition, Support Groups, Survivorship and Conversations that Matter Booklet, ACS Life After Cancer, along with Late and Long Term Affects.    Vidant Acuity Tool:  2    Diagnosis:   Breast Cancer    Stage:  Cancer Staging  DCIS / pT1bN0    New Cancer Screening:   Per patient, PCP to follow preventative cancer screenings.  Patient will continue to have follow up appointments with oncology.     Nurse Navigator Intervention:   Survivorship discussion held today.  Met with Ms Jimenez in the clinic and a written copy of her Survivorship Care Plan was given, discussed and reviewed.  Diagnosis, staging and treatment summary, along with signs and symptoms of cancer recurrence were reviewed.  Educational materials pertaining to her cancer diagnosis provided and placed within her Survivorship folder.  Follow up surveillance and importance of preventive wellness exams discussed.  Information on cancer surveillance and screenings provided within patients care plan. Referral made to Dietician to discuss weight management strategies.  Referrals offered for OSW and Financial Navigator,  patient declined at this time.      Distress screenin    Time Spent Evaluating: I spent 30 minutes caring for Margy on this date of service. This time includes time spent by me in the following activities: preparing for the visit by way of obtaining history, reviewing history, communicating with members of the healthcare team, creating a care plan, discussion of the care plan with the patient and allowing time to answer patient questions.     Gladis Olmedo RN  2022

## 2022-02-24 ENCOUNTER — TELEPHONE (OUTPATIENT)
Dept: NUTRITION | Facility: HOSPITAL | Age: 62
End: 2022-02-24

## 2022-02-24 NOTE — PROGRESS NOTES
Outpatient Nutrition Oncology Assessment    Patient Name: Margy Jimenez  YOB: 1960  MRN: [id number]  Assessment Date: 2/24/2022    CLINICAL NUTRITION ASSESSMENT      Type of Cancer Treatment         CLINICAL NUTRITION ASSESSMENT      Reason for Assessment  Nurse practioner/physician assistant consult     H&P:    Past Medical History:   Diagnosis Date   • Cancer (HCC)     BREAST   • GERD (gastroesophageal reflux disease)    • High blood pressure    • Hyperlipidemia    • Mood disorder (HCC)    • Neoplasm of left breast    • S/P bilateral mastectomy 12/8/2021   • Status post bilateral breast reconstruction with implant placement 12/8/2021        Current Problems:   Patient Active Problem List   Diagnosis Code   • High blood pressure I10   • Mood disorder (HCC) F39   • Anxiety F41.9   • Biceps tendinitis, right M75.21   • Fatigue R53.83   • GERD (gastroesophageal reflux disease) K21.9   • Hyperlipidemia E78.5   • Migraine G43.909   • Nontraumatic incomplete tear of right rotator cuff M75.111   • Pain and swelling of right shoulder M25.511, M25.411   • S/P rotator cuff repair Z98.890   • Shoulder impingement, right M75.41   • Hypertension I10   • Depression F32.A   • Gastroesophageal reflux disease K21.9   • Heartburn R12   • Malignant neoplasm of left breast in female, estrogen receptor positive (HCC) C50.912, Z17.0   • S/P bilateral mastectomy Z90.13   • Status post bilateral breast reconstruction with implant placement Z98.890   • Postoperative follow-up Z09            BMI kg/m2   There is no height or weight on file to calculate BMI.    Weight Hx  Wt Readings from Last 30 Encounters:   02/23/22 1458 95.4 kg (210 lb 5.1 oz)   01/31/22 1010 94.5 kg (208 lb 5.4 oz)   01/11/22 1406 94.5 kg (208 lb 5.4 oz)   01/06/22 1149 95.3 kg (210 lb)   12/21/21 1259 94.3 kg (208 lb)   12/20/21 1307 94.9 kg (209 lb 3.5 oz)   12/17/21 1001 95.7 kg (211 lb)   12/07/21 0635 95.8 kg (211 lb 3.2 oz)   11/18/21 1120  93 kg (205 lb)   11/09/21 0854 95.3 kg (210 lb)   11/08/21 1122 95.3 kg (210 lb)   10/26/21 1135 95.4 kg (210 lb 5.1 oz)   10/21/21 1454 95.7 kg (211 lb)   10/11/21 1103 95.8 kg (211 lb 3.2 oz)   10/07/21 1457 95.5 kg (210 lb 8.6 oz)   08/31/21 1118 95.4 kg (210 lb 5.1 oz)   08/04/21 1022 96.3 kg (212 lb 4.9 oz)   06/16/21 1435 93.9 kg (207 lb)   04/18/18 0000 87.1 kg (192 lb)         Estimated/Assessed Needs     Row Name 02/24/22 1446          Calculation Measurements    Weight Used For Calculations 95.4 kg (210 lb 5.1 oz)            Estimated/Assessed Needs    Additional Documentation KCAL/KG (Group); Protein Requirements (Group)            KCAL/KG    KCAL/KG 18 Kcal/Kg (kcal)     18 Kcal/Kg (kcal) 1717.2            Protein Requirements    Weight Used For Protein Calculations 52.3 kg (115 lb 4.8 oz)     Est Protein Requirement Amount (gms/kg) 1.7 gm protein     Estimated Protein Requirements (gms/day) 88.91                  Labs/Medications        Pertinent Labs Reviewed.         Invalid input(s): LABALBU, PROT      No results found for: COVID19  No results found for: HGBA1C      Pertinent Medications COVID-19 Test, acetaminophen, amLODIPine, anastrozole, atorvastatin, baclofen, busPIRone, celecoxib, clindamycin, diclofenac, escitalopram, lisinopril-hydrochlorothiazide, mirtazapine, pantoprazole, promethazine, and tamsulosin     Physical Findings            Current Nutrition Orders & Evaluation of Intake       Oral Nutrition     Current PO Diet 2 meals/day, fair protein, frequent snacking   Supplement      Enteral Nutrition     Current Formula    Schedule      Parenteral Nutrition     Current Prescription    Schedule      Nutrition Diagnosis        Nutrition Dx Problem 1 Inadequate nutrient intake (protein) related to lack of prior nutrition related education as evidenced by patient report., food recall history.       Nutrition Intervention       RD Action Nutritional Counseling     Monitor/Evaluation      "  Monitor Per oncology nutrition protocol.     Comments:  Consult received through survivorship program to discuss nutrition POC and desired weight loss goals. Pt s/p bilateral mastectomy for breast CA. Spoke with pt via phone. Pt reports she works from home, consumes 2 meals/day with frequent snacking and reports a desire to eat a larger variety of foods and to lose weight. She has good support from her daughter. She has a good knowledge base of nutritious foods since her daughter has T1DM (and is now grown), but states that she snacks too frequently since working from home. Pt has breakfast in the mornings (usually a muffin or bagel), snacks throughout the day (example: \"cheetos\" per pt), and wide variety dinner with her daughter in the evenings. While working during the day, she reports she does not usually stop to eat lunch, but will snack throughout the day. Discussed MNT for survivorship, including a wide variety of fruits / vegetables, whole grains, lean meats, less processed meats, and plant based protein sources. Also discussed importance of adequate protein with each meal as well as designating a time (15-20 minutes) and a place to enjoy 3 meals, away from other distractions, if able. Pt agreed to try. Reviewed easy to make, healthy lunch ideas. Also encouraged pt to make small changes that can be sustained vs. multiple goals at once if this is too difficult. Discussed weight loss tips as well. Offered to mail handouts to pt, however, she refused at this time but does have oncology RD contact information. Encouraged pt to reach out with further questions or concerns. Will be available prn via consult.     Electronically signed by:  Kat Nichols RD  02/24/22 14:48 EST  "

## 2022-03-07 NOTE — PROGRESS NOTES
"Chief Complaint  Follow-up (Breast recon)    Subjective          History of Present Illness  Margy Jimenez is a 61 y.o. female who presents to [facility] PLASTIC & RECONSTRUCTIVE SURGERY s/p MASTECTOMY AND BILATERAL BREAST RECONSTRUCTION WITH IMPLANTS, IMPLANT OF BIOLOGICAL MESH,  USE OF SPY, AND BILATERAL CHEST LIPOSUCTION on 12/7/21. Patient stated that her left breast is lower than the right . Feeling better. Has a lump on the right side where the drain was placed .       Allergies: Hydromorphone, Hydromorphone hcl, and Cephalexin  Allergies Reconciled.    Review of Systems     Objective     /98 (BP Location: Right arm, Patient Position: Sitting)   Pulse 75   Temp 97.9 °F (36.6 °C) (Temporal)   Ht 160 cm (63\")   Wt 95.3 kg (210 lb)   SpO2 100%   BMI 37.20 kg/m²     Body mass index is 37.2 kg/m².    Physical Exam    Left breast has dropped and it is asymmetric.       Result Review :                Assessment and Plan      Diagnoses and all orders for this visit:    1. Contour irregularity in reconstructed breast (Primary)        Plan:  • I plan to proceed with bilateral nipple reconstruction with bilateral skin tailoring.     49595-84 revision of reconstructed breast:  99812-52 bilateral nipple reconstruction    PROCEDURE- BILATERAL NIPLE RECONSTRUCTION AND LEFT BREAST REVISION        Follow Up     No follow-ups on file.    Patient was given instructions and counseling regarding her condition. Please see specific information pulled into the AVS if appropriate.     Neena Pires MD  03/09/2022  "

## 2022-03-09 ENCOUNTER — OFFICE VISIT (OUTPATIENT)
Dept: PLASTIC SURGERY | Facility: CLINIC | Age: 62
End: 2022-03-09

## 2022-03-09 ENCOUNTER — PREP FOR SURGERY (OUTPATIENT)
Dept: OTHER | Facility: HOSPITAL | Age: 62
End: 2022-03-09

## 2022-03-09 VITALS
HEART RATE: 75 BPM | SYSTOLIC BLOOD PRESSURE: 165 MMHG | TEMPERATURE: 97.9 F | BODY MASS INDEX: 37.21 KG/M2 | HEIGHT: 63 IN | OXYGEN SATURATION: 100 % | WEIGHT: 210 LBS | DIASTOLIC BLOOD PRESSURE: 98 MMHG

## 2022-03-09 DIAGNOSIS — N65.0 CONTOUR IRREGULARITY IN RECONSTRUCTED BREAST: Primary | ICD-10-CM

## 2022-03-09 DIAGNOSIS — N64.89 BREAST ASYMMETRY: Primary | ICD-10-CM

## 2022-03-09 PROCEDURE — 99212 OFFICE O/P EST SF 10 MIN: CPT | Performed by: SURGERY

## 2022-03-09 RX ORDER — CLINDAMYCIN PHOSPHATE 900 MG/50ML
900 INJECTION INTRAVENOUS ONCE
Status: CANCELLED | OUTPATIENT
Start: 2022-03-09 | End: 2022-03-09

## 2022-03-11 PROBLEM — N64.89 BREAST ASYMMETRY: Status: ACTIVE | Noted: 2022-03-11

## 2022-03-17 ENCOUNTER — TELEPHONE (OUTPATIENT)
Dept: PLASTIC SURGERY | Facility: CLINIC | Age: 62
End: 2022-03-17

## 2022-03-24 ENCOUNTER — NURSE NAVIGATOR (OUTPATIENT)
Dept: ONCOLOGY | Facility: HOSPITAL | Age: 62
End: 2022-03-24

## 2022-03-24 ENCOUNTER — TELEPHONE (OUTPATIENT)
Dept: ONCOLOGY | Facility: HOSPITAL | Age: 62
End: 2022-03-24

## 2022-03-24 NOTE — TELEPHONE ENCOUNTER
Caller: Margy Jimenez    Relationship: Self    Best call back number: [phone]    Who are you requesting to speak with (clinical staff, provider,  specific staff member): VESNA        What was the call regarding: PATIENT STATED SHE HAS A SURGERY COMING UP AND WANTED TO SPEAK TO VESNA    Do you require a callback: YES

## 2022-03-24 NOTE — PROGRESS NOTES
"Patient called thru HUB.  Returned patients phone call per request at \"best number to call\" provided.  No answer, left VM for patient.   "

## 2022-03-25 ENCOUNTER — TELEPHONE (OUTPATIENT)
Dept: ONCOLOGY | Facility: HOSPITAL | Age: 62
End: 2022-03-25

## 2022-03-25 ENCOUNTER — NURSE NAVIGATOR (OUTPATIENT)
Dept: ONCOLOGY | Facility: HOSPITAL | Age: 62
End: 2022-03-25

## 2022-03-25 NOTE — TELEPHONE ENCOUNTER
Caller: Margy Jimenez    Relationship: Self    Best call back number: [phone]    What was the call regarding: PATIENT CALLED REGARDING HER APPOINTMENT ON 4-15-22. SHE STATED SHE NEVER CANCELED OR RESCHEDULED THAT. SHE IS VERY UPSET THAT THIS HAPPENS TO HER FREQUENTLY. SHE STATED ITS HAPPENED 3 DIFFERENT TIMES WHERE SHE MAKES HER APPOINTMENT AND THEN LOOKS IN MY CHART AND ITS BEEN CHANGED

## 2022-03-25 NOTE — TELEPHONE ENCOUNTER
Diagnosis: Breat cancer    Reason for referral: Financial assistance    Comments: OSW assistance requested by survivorship RN, Erica WALKER, indicating pt expressed financial distress in relation to meeting her deductible for reconstructive surgery. OSW contacted pt via telephone to further assess. Pt reports she will have an out-of-pocket expense of $3,000 for the reconstructive surgery due to not having met her insurance deductible for the year, which is around $4,000+. Pt reports she has already spoken with her insurance company about this. Pt reports she has a health savings account, however, depleted her funds last year throughout cancer tx. Informed pt OSW has submitted a referral to oncology financial navigator (OFKELSEA) to contact pt directly to discuss opportunity to apply for [employer] Program (FAP). Discussed possibility of pt applying for additional assistance through breast cancer foundations. Pt provided OSW with her estimated yearly income. Unfortunately, pt is over income to apply for assistance through [employer]. OSW will review list of breast cancer resources and get back in touch with pt regarding eligibility guidelines. Pt expressed gratitude.     Services/Referrals Provided: Financial assistance - oncology financial navigator, [employer] financial assistance program    Update 3/25/22: Contacted pt back later this afternoon to discuss opportunity to apply for the individual taylor through the [employer] ([facility] to assist with direct reconstructive medical expenses, however, unfortunately the organization has exceeded their applications for the month and pt will have to check back next month. Other resources may include the $100 [employer] card/COVID program through [facility] and [address]. Pt reports [employer] contacted her this afternoon to discuss [employer] FAP and is mailing this application to her today. Pt reports if she is not approved for FAP, she will plan to reach  out to OSW to explore these other financial resources mentioned today. Provided pt with my direct number, encouraging OSW support remains available. Pt expressed graittude.

## 2022-03-25 NOTE — PROGRESS NOTES
Patient called Survivorship Nurse Navigator this morning to see if there was any assistance she could receive for an upcoming breast surgery on April 28th.  I thanked her for reaching out to me and allowing me to help her.  I explained to her that I would reach out to our Financial Navigator (FN) and our Outpatient  (OSW) to see if there was any assistance that we could provide.  I informed the patient that I would be out of the office next week, but assured her that we would be in contact with her.   Request made to FN and OSW on patients behalf.

## 2022-03-28 ENCOUNTER — APPOINTMENT (OUTPATIENT)
Dept: OCCUPATIONAL THERAPY | Facility: HOSPITAL | Age: 62
End: 2022-03-28

## 2022-04-06 NOTE — PROGRESS NOTES
"Follow-up (PRE OP breast revision surgery)            History of Present Illness  Margy Jimenez is a 62 y.o. female who presents to [facility] PLASTIC & RECONSTRUCTIVE SURGERY for Pre-Operative Examination for BREAST RECONSTRUCTION REVISION  with bilateral skin resection, bilateral nipple reconstruction 4/27/22. Left breast hangs lower than right. Has excess skin that puckers over right axilla area and would like it excised. Implant can be felt on left and skin is thin, may need fat grafting. Would like nipples reconstructed if possible but will have done in office if breast need to settle.       Subjective        Hydromorphone, Hydromorphone hcl, and Cephalexin  Allergies Reconciled.    Review of Systems   All review of system has been reviewed and it  is negative except the ones note above.     Objective     /81   Pulse 83   Temp 97.5 °F (36.4 °C) (Temporal)   Ht 160 cm (62.99\")   BMI 37.21 kg/m²     Body mass index is 37.21 kg/m².    Physical Exam    Breast soft, implants intact, right lateral axilla area with puckering around drain site and excess tissue with contour irregularities, left implant settled much lower than right, left breast skin thin and implant rippling is noted  Result Review :                Assessment and Plan      Diagnoses and all orders for this visit:    1. Pre-operative examination (Primary)    2. Malignant neoplasm of upper-outer quadrant of left breast in female, estrogen receptor positive (HCC)        Plan:  • Given these options, the patient has verbally expressed an understanding of the risks of surgery and finds these risks acceptable. We will proceed with surgery as soon as possible.  • Covid Immunization status: up to date.   • Medications sent to pharmacy  Discussed fat grafting from abdomen, skin tailoring bilaterally, scar revision of right axilla area and possible nipple reconstruction.       Follow Up     No follow-ups on file.    Patient " was given instructions and counseling regarding her condition. Please see specific information pulled into the AVS if appropriate.     Hoda Espinoza, CECI  04/12/2022

## 2022-04-07 ENCOUNTER — HOSPITAL ENCOUNTER (OUTPATIENT)
Dept: OCCUPATIONAL THERAPY | Facility: HOSPITAL | Age: 62
Setting detail: THERAPIES SERIES
Discharge: HOME | End: 2022-04-07

## 2022-04-07 DIAGNOSIS — Z91.89 AT RISK FOR LYMPHEDEMA: Primary | ICD-10-CM

## 2022-04-07 DIAGNOSIS — R52 PAIN: ICD-10-CM

## 2022-04-07 DIAGNOSIS — C50.912 MALIGNANT NEOPLASM OF LEFT BREAST IN FEMALE, ESTROGEN RECEPTOR POSITIVE, UNSPECIFIED SITE OF BREAST: ICD-10-CM

## 2022-04-07 DIAGNOSIS — Z17.0 MALIGNANT NEOPLASM OF LEFT BREAST IN FEMALE, ESTROGEN RECEPTOR POSITIVE, UNSPECIFIED SITE OF BREAST: ICD-10-CM

## 2022-04-07 DIAGNOSIS — L90.5 SCAR CONDITION AND FIBROSIS OF SKIN: ICD-10-CM

## 2022-04-07 PROCEDURE — 93702 BIS XTRACELL FLUID ANALYSIS: CPT

## 2022-04-07 PROCEDURE — 97535 SELF CARE MNGMENT TRAINING: CPT

## 2022-04-07 NOTE — THERAPY RE-EVALUATION
Outpatient Occupational Therapy Lymphedema Re-Evaluation   [place]     Patient Name: Margy Jimeenz  : 1960  MRN: [id number]  Today's Date: 2022      Visit Date: 2022    Patient Active Problem List   Diagnosis   • High blood pressure   • Mood disorder (HCC)   • Anxiety   • Biceps tendinitis, right   • Fatigue   • GERD (gastroesophageal reflux disease)   • Hyperlipidemia   • Migraine   • Nontraumatic incomplete tear of right rotator cuff   • Pain and swelling of right shoulder   • S/P rotator cuff repair   • Shoulder impingement, right   • Hypertension   • Depression   • Gastroesophageal reflux disease   • Heartburn   • Malignant neoplasm of left breast in female, estrogen receptor positive (HCC)   • S/P bilateral mastectomy   • Status post bilateral breast reconstruction with implant placement   • Postoperative follow-up   • Contour irregularity in reconstructed breast   • Breast asymmetry        Past Medical History:   Diagnosis Date   • Cancer (HCC)     BREAST   • GERD (gastroesophageal reflux disease)    • High blood pressure    • Hyperlipidemia    • Mood disorder (HCC)    • Neoplasm of left breast    • S/P bilateral mastectomy 2021   • Status post bilateral breast reconstruction with implant placement 2021        Past Surgical History:   Procedure Laterality Date   • ABDOMINAL HYSTERECTOMY     • BREAST AUGMENTATION Bilateral 2021    Procedure: BILATERAL BREAST RECONSTRUCTION WITH IMPLANTS, IMPLANT OF BIOLOGICAL MESH,  USE OF SPY, AND BILATERAL CHEST LIPOSUCTION;  Surgeon: Neena Pires MD;  Location: [facility];  Service: Plastics;  Laterality: Bilateral;   • BREAST BIOPSY Left 10/26/2021    Procedure: LEFT BREAST LUMPECTOMY WITH SENTINEL NODE BIOPSY AND NEEDLE LOCALIZATION;  Surgeon: Ayah Chaparro MD;  Location: [facility] OR;  Service: General;  Laterality: Left;   • BUNIONECTOMY     • COLONOSCOPY      2018    • COLONOSCOPY N/A 2021     Procedure: COLONOSCOPY WITH INJECTION ORISE/POLYPECTOMY HOT SNARE/ENDO CLIP X3;  Surgeon: David Hernandez MD;  Location: [facility] ENDOSCOPY;  Service: Gastroenterology;  Laterality: N/A;  COLON POLYP   • CYSTOSCOPY     • ENDOSCOPY     • ENDOSCOPY N/A 8/31/2021    Procedure: ESOPHAGOGASTRODUODENOSCOPY WITH BIOPSY;  Surgeon: David Hernandez MD;  Location: [facility] ENDOSCOPY;  Service: Gastroenterology;  Laterality: N/A;  GASTRIC POLYPS/REFLUX ESOPHAGITIS   • LAPAROSCOPIC CHOLECYSTECTOMY     • MASTECTOMY Bilateral 12/7/2021    Procedure: MASTECTOMY;  Surgeon: Ayah Chaparro MD;  Location: [facility] OR [facility];  Service: General;  Laterality: Bilateral;   • ROTATOR CUFF REPAIR Right          Visit Dx:     ICD-10-CM ICD-9-CM   1. At risk for lymphedema  Z91.89 V49.89   2. Malignant neoplasm of left breast in female, estrogen receptor positive, unspecified site of breast (HCC)  C50.912 174.9    Z17.0 V86.0   3. Pain  R52 780.96   4. Scar condition and fibrosis of skin  L90.5 709.2            Lymphedema     Row Name 04/07/22 1100             Subjective Pain    Able to rate subjective pain? yes  -CH      Pre-Treatment Pain Level 0  -CH      Post-Treatment Pain Level 0  -CH              Lymphedema Assessment    Lymphedema Classification LUE:;at risk/stage 0  -CH      Lymphedema Cancer Related Sx bilateral;simple mastectomy;sentinel node biopsy  -CH      Lymph Nodes Removed # 4  -CH      Positive Lymph Nodes # 0  -CH      Chemo Received yes  -CH      Chemo Treatments #/Timeframe Anastrozole  -CH      Radiation Therapy Received no  -CH              LLIS - Physical Concerns    The amount of pain associated with my lymphedema is: 0  -CH      The amount of limb heaviness associated with my lymphedema is: 0  -CH      The amount of skin tightness associated with my lymphedema is: 0  -CH      The size of my swollen limb(s) seems: 0  -CH      Lymphedema affects the movement of my swollen limb(s): 0  -CH      The strength in my  "swollen limb(s) is: 0  -CH              LLIS - Psychosocial Concerns    Lymphedema affects my body image (i.e., \"how I think I look\"). 0  -CH      Lymphedema affects my socializing with others. 0  -CH      Lymphedema affects my intimate relations with spouse or partner (rate 0 if not applicable 0  -CH      Lymphedema \"gets me down\" (i.e., depression, frustration, or anger) 0  -CH      I must rely on others for help due to my lymphedema. 0  -CH      I know what to do to manage my lymphedema 1  -CH              LLIS - Functional Concerns    Lymphedema affects my ability to perform self-care activities (i.e. eating, dressing, hygiene) 0  -CH      Lymphedema affects my ability to perform routine home or work-related activities. 0  -CH      Lymphedema affects my performance of preferred leisure activities. 0  -CH      Lymphedema affects proper fit of clothing/shoes 0  -CH      Lymphedema affects my sleep 0  -CH              Posture/Observations    Posture- WNL Posture is WNL  -CH              General ROM    GENERAL ROM COMMENTS B UE WFL  -CH              L-Dex Bioimpedence Screening    L-Dex Measurement Extremity LUE  -CH      L-Dex Patient Position Standing  -CH      L-Dex UE Dominate Side Right  -CH      L-Dex UE At Risk Side Left  -CH      L-Dex UE Pre Surgical Value Yes  -CH      L-Dex UE Score 7  -CH      L-Dex UE Baseline Score 2.2  -CH      L-Dex UE Value Change 4.8  -CH      $ L-Dex Charge yes  -CH              Lymphedema Life Impact Scale Totals    A.  Total Q1 - Q17 (Do not include Q18) 1  -CH      B.  Total number of questions answered (Q1-Q17) 17  -CH      C. Divide A by B 0.06  -CH      D. Multiple C by 25 1.5  -CH            User Key  (r) = Recorded By, (t) = Taken By, (c) = Cosigned By    Initials Name Provider Type    Lenore Mendieta OT Occupational Therapist                        Therapy Education  Education Details: OT reviewed lymphedema prevention education with patient with emphasis on being " cautious the summer with insect bites, sunburns/over exposure to the sun and injuries that are sustained with performing outdoor activities such as sports and gardening.  Patient did state that she does not perform a lot of these activities but does do her own lawn care.  OT believes that patient should be okay with lawnmower and weedeater but did request that patient do trials of use of those devices for approximately 20 to 30 minutes.  OT also requested that patient allow for multiple hours to pass to determine if she is experiencing any swelling as it takes time before it is overtly obvious that swelling is occurring.  If she does not swell patient was instructed to discontinue on with her activity as it most likely will not cause a problem for her.  Patient did educate OT that she will be having a another reconstruction surgery in April.  OT requested the patient come for a follow-up approximately 3 weeks after this to ensure normalize lymphatic functioning after this new surgery.  Given: Symptoms/condition management  Program: Reinforced  How Provided: Verbal  Provided to: Patient  Level of Understanding: Verbalized  06829 - OT Self Care/Mgmt Minutes: 15         OT Goals     Row Name 04/07/22 1515          Time Calculation    OT Goal Re-Cert Due Date 05/07/22  -           User Key  (r) = Recorded By, (t) = Taken By, (c) = Cosigned By    Initials Name Provider Type    Lenore Mendieta OT Occupational Therapist              GOALS:    LTG 1b: 90 days:  As an indicator of no exacerbation of lymphedema staging, the patient will present with an L-Dex score less than 7 points from preoperative baseline.              STATUS: Met: ongoing              STG 1a:   30 days: To prevent exacerbation of mixed edema to lymphedema, patient will utilize the 2 postsurgical compression garments daily.                 STATUS: Met:  Discontinue  STG 1b: 30 days: Patient will be independent with self-manual lymphatic massage.                STATUS: Met: ongoing  STG 1c: 30 days:  Patient will be independent with identification of signs and symptoms of lymphedema exasperation per stoplight to recovery education handout.              STATUS: Met: ongoing  STG 1 d: 30 days: Patient will be independent with HEP to prevent advancement in lymphedema staging.              STATUS: Met: ongoing  TREATMENT:  Self Care/ADL retraining, Therapeutic Activity, Neuromuscular Re-education, Therapeutic Exercise, Bioimpedence Fluid Analysis, Post-Surgical compression garement [id number] [address] [facility], Orthotic Management and training,  and Manual Therapy.       OT Assessment/Plan     Row Name 04/07/22 1514          OT Assessment    Functional Limitations Performance in self-care ADL;Other (comment)  -CH     Impairments Impaired lymphatic circulation;Pain  -CH     Assessment Comments Patient is demonstrating a mild increase in limb volume however her L-Dex score is indicating normalize lymphatic functioning.  Patient will anticipate an additional breast reconstruction surgery and will benefit from continued skilled occupational therapy services to evaluate ongoing lymphatic functioning to prevent advancement in lymphedema staging.  -CH     Patient/caregiver participated in establishment of treatment plan and goals Yes  -CH     Patient would benefit from skilled therapy intervention Yes  -CH            OT Plan    OT Frequency Other (comment)  See duration  -CH     Predicted Duration of Therapy Intervention (OT) Pt. to re-evaluated 3 weeks post-surgery, 3 weeks post XRT, every 3 months from baseline for years 1-3 and every 6 months years 4 and 5 .  -CH     Planned CPT's? OT RE-EVAL: 59065;OT NEUROMUSC RE EDUCATION EA 15 MIN: 40024;OT SELF CARE/MGMT/TRAIN 15 MIN: 75284;OT HOT/COLD PACK;OT ULTRASOUND EA 15 MIN: 15537;OT MANUAL THERAPY EA 15 MIN: 76074;OT BIS XTRACELL FLUID ANALYSIS: 80095;OT ORTHOTIC MGMT/TRAIN EA 15 MIN: 05511;OT ORTHO/PROSTHET CHECKOUT EA 15  MIN: 18675  -     Planned Therapy Interventions (Optional Details) prosthetic fitting/training;patient/family education;postural re-education;ROM (Range of Motion);orthotic fitting/training;manual therapy techniques;home exercise program;strengthening;stretching  -           User Key  (r) = Recorded By, (t) = Taken By, (c) = Cosigned By    Initials Name Provider Type     Lenore Rider OT Occupational Therapist                          Time Calculation:   Timed Charges  95903 - OT Self Care/Mgmt Minutes: 15  Total Minutes  Timed Charges Total Minutes: 15   Total Minutes: 15     Therapy Charges for Today     Code Description Service Date Service Provider Modifiers Qty    73914294192 HC PT BIS XTRACELL FLUID ANALYSIS 4/7/2022 Lenore Rider OT  1    [id number]  OT SELF CARE/MGMT/TRAIN EA 15 MIN 4/7/2022 Lenore Rider OT GO 1                    Lenore Rider OT  4/7/2022

## 2022-04-08 ENCOUNTER — TELEPHONE (OUTPATIENT)
Dept: ONCOLOGY | Facility: HOSPITAL | Age: 62
End: 2022-04-08

## 2022-04-11 ENCOUNTER — HOSPITAL ENCOUNTER (OUTPATIENT)
Dept: BONE DENSITY | Facility: HOSPITAL | Age: 62
Discharge: HOME | End: 2022-04-11
Admitting: INTERNAL MEDICINE

## 2022-04-11 DIAGNOSIS — Z78.0 POST-MENOPAUSAL: ICD-10-CM

## 2022-04-11 PROCEDURE — 77080 DXA BONE DENSITY AXIAL: CPT

## 2022-04-12 ENCOUNTER — OFFICE VISIT (OUTPATIENT)
Dept: PLASTIC SURGERY | Facility: CLINIC | Age: 62
End: 2022-04-12

## 2022-04-12 VITALS
BODY MASS INDEX: 37.21 KG/M2 | SYSTOLIC BLOOD PRESSURE: 115 MMHG | HEART RATE: 83 BPM | DIASTOLIC BLOOD PRESSURE: 81 MMHG | TEMPERATURE: 97.5 F | HEIGHT: 63 IN

## 2022-04-12 DIAGNOSIS — Z17.0 MALIGNANT NEOPLASM OF UPPER-OUTER QUADRANT OF LEFT BREAST IN FEMALE, ESTROGEN RECEPTOR POSITIVE: ICD-10-CM

## 2022-04-12 DIAGNOSIS — Z01.818 PRE-OPERATIVE EXAMINATION: Primary | ICD-10-CM

## 2022-04-12 DIAGNOSIS — C50.412 MALIGNANT NEOPLASM OF UPPER-OUTER QUADRANT OF LEFT BREAST IN FEMALE, ESTROGEN RECEPTOR POSITIVE: ICD-10-CM

## 2022-04-12 PROCEDURE — 99213 OFFICE O/P EST LOW 20 MIN: CPT | Performed by: NURSE PRACTITIONER

## 2022-04-12 RX ORDER — ANASTROZOLE 1 MG/1
1 TABLET ORAL DAILY
COMMUNITY
Start: 2022-02-11 | End: 2022-04-12 | Stop reason: SDUPTHER

## 2022-04-12 RX ORDER — PANTOPRAZOLE SODIUM 40 MG/1
1 TABLET, DELAYED RELEASE ORAL 2 TIMES DAILY PRN
COMMUNITY
Start: 2022-02-02

## 2022-04-12 RX ORDER — BACLOFEN 10 MG/1
10 TABLET ORAL 3 TIMES DAILY
COMMUNITY
Start: 2022-03-17 | End: 2022-04-12 | Stop reason: SDUPTHER

## 2022-04-12 RX ORDER — ATORVASTATIN CALCIUM 10 MG/1
10 TABLET, FILM COATED ORAL DAILY
COMMUNITY
Start: 2022-03-17 | End: 2022-04-12 | Stop reason: SDUPTHER

## 2022-04-12 RX ORDER — OXYCODONE HYDROCHLORIDE AND ACETAMINOPHEN 5; 325 MG/1; MG/1
1 TABLET ORAL EVERY 6 HOURS PRN
Qty: 28 TABLET | Refills: 0 | Status: SHIPPED | OUTPATIENT
Start: 2022-04-12 | End: 2022-05-04

## 2022-04-12 RX ORDER — BUSPIRONE HYDROCHLORIDE 10 MG/1
10 TABLET ORAL NIGHTLY
COMMUNITY
Start: 2022-03-17 | End: 2023-03-17

## 2022-04-12 RX ORDER — CLINDAMYCIN HYDROCHLORIDE 150 MG/1
150 CAPSULE ORAL 4 TIMES DAILY
Qty: 40 CAPSULE | Refills: 0 | Status: SHIPPED | OUTPATIENT
Start: 2022-04-12 | End: 2022-04-22

## 2022-04-12 RX ORDER — MIRTAZAPINE 15 MG/1
15 TABLET, FILM COATED ORAL DAILY
COMMUNITY
Start: 2022-03-17 | End: 2022-04-20

## 2022-04-12 RX ORDER — AMLODIPINE BESYLATE 2.5 MG/1
2.5 TABLET ORAL DAILY
COMMUNITY
Start: 2022-03-17 | End: 2022-04-12 | Stop reason: SDUPTHER

## 2022-04-12 RX ORDER — PROMETHAZINE HYDROCHLORIDE 25 MG/1
25 TABLET ORAL EVERY 6 HOURS PRN
Qty: 10 TABLET | Refills: 0 | Status: SHIPPED | OUTPATIENT
Start: 2022-04-12 | End: 2022-11-17

## 2022-04-15 ENCOUNTER — APPOINTMENT (OUTPATIENT)
Dept: ONCOLOGY | Facility: HOSPITAL | Age: 62
End: 2022-04-15

## 2022-04-18 ENCOUNTER — TELEPHONE (OUTPATIENT)
Dept: ONCOLOGY | Facility: HOSPITAL | Age: 62
End: 2022-04-18

## 2022-04-18 NOTE — TELEPHONE ENCOUNTER
Caller: Margy Jimenez    Relationship to patient: Self        Chief complaint: NEEDING TO RESCHEDULE  F/U 04/18    Type of visit: FOLLOW UP     WARM TRANSFERRED TO JULIOCESAR AT THE  TO FURTHER ASSIST.

## 2022-04-19 ENCOUNTER — APPOINTMENT (OUTPATIENT)
Dept: ONCOLOGY | Facility: HOSPITAL | Age: 62
End: 2022-04-19

## 2022-04-27 ENCOUNTER — ANESTHESIA (OUTPATIENT)
Dept: PERIOP | Facility: HOSPITAL | Age: 62
End: 2022-04-27

## 2022-04-27 ENCOUNTER — HOSPITAL ENCOUNTER (OUTPATIENT)
Facility: HOSPITAL | Age: 62
Setting detail: HOSPITAL OUTPATIENT SURGERY
Discharge: HOME | End: 2022-04-27
Attending: SURGERY | Admitting: SURGERY

## 2022-04-27 ENCOUNTER — ANESTHESIA EVENT (OUTPATIENT)
Dept: PERIOP | Facility: HOSPITAL | Age: 62
End: 2022-04-27

## 2022-04-27 VITALS
HEART RATE: 66 BPM | SYSTOLIC BLOOD PRESSURE: 146 MMHG | WEIGHT: 207.01 LBS | OXYGEN SATURATION: 93 % | HEIGHT: 63 IN | RESPIRATION RATE: 17 BRPM | DIASTOLIC BLOOD PRESSURE: 88 MMHG | TEMPERATURE: 97.8 F | BODY MASS INDEX: 36.68 KG/M2

## 2022-04-27 DIAGNOSIS — N64.89 BREAST ASYMMETRY: ICD-10-CM

## 2022-04-27 LAB
ANION GAP SERPL CALCULATED.3IONS-SCNC: 11.5 MMOL/L (ref 5–15)
BUN SERPL-MCNC: 15 MG/DL (ref 8–23)
BUN/CREAT SERPL: 15.6 (ref 7–25)
CALCIUM SPEC-SCNC: 10 MG/DL (ref 8.6–10.5)
CHLORIDE SERPL-SCNC: 102 MMOL/L (ref 98–107)
CO2 SERPL-SCNC: 27.5 MMOL/L (ref 22–29)
CREAT SERPL-MCNC: 0.96 MG/DL (ref 0.57–1)
EGFRCR SERPLBLD CKD-EPI 2021: 66.6 ML/MIN/1.73
GLUCOSE SERPL-MCNC: 103 MG/DL (ref 65–99)
POTASSIUM SERPL-SCNC: 3.9 MMOL/L (ref 3.5–5.2)
SODIUM SERPL-SCNC: 141 MMOL/L (ref 136–145)

## 2022-04-27 PROCEDURE — 25010000002 ONDANSETRON PER 1 MG: Performed by: NURSE ANESTHETIST, CERTIFIED REGISTERED

## 2022-04-27 PROCEDURE — 25010000002 MIDAZOLAM PER 1 MG: Performed by: ANESTHESIOLOGY

## 2022-04-27 PROCEDURE — 25010000002 PROPOFOL 10 MG/ML EMULSION: Performed by: NURSE ANESTHETIST, CERTIFIED REGISTERED

## 2022-04-27 PROCEDURE — 19350 NIPPLE/AREOLA RECONSTRUCTION: CPT | Performed by: SURGERY

## 2022-04-27 PROCEDURE — 25010000002 FENTANYL CITRATE (PF) 50 MCG/ML SOLUTION: Performed by: NURSE ANESTHETIST, CERTIFIED REGISTERED

## 2022-04-27 PROCEDURE — 0 MEPERIDINE PER 100 MG: Performed by: NURSE ANESTHETIST, CERTIFIED REGISTERED

## 2022-04-27 PROCEDURE — 25010000002 NALOXONE PER 1 MG: Performed by: NURSE ANESTHETIST, CERTIFIED REGISTERED

## 2022-04-27 PROCEDURE — 80048 BASIC METABOLIC PNL TOTAL CA: CPT | Performed by: ANESTHESIOLOGY

## 2022-04-27 PROCEDURE — 25010000002 DEXAMETHASONE PER 1 MG: Performed by: NURSE ANESTHETIST, CERTIFIED REGISTERED

## 2022-04-27 RX ORDER — MEPERIDINE HYDROCHLORIDE 25 MG/ML
12.5 INJECTION INTRAMUSCULAR; INTRAVENOUS; SUBCUTANEOUS
Status: DISCONTINUED | OUTPATIENT
Start: 2022-04-27 | End: 2022-04-27 | Stop reason: HOSPADM

## 2022-04-27 RX ORDER — FENTANYL CITRATE 50 UG/ML
INJECTION, SOLUTION INTRAMUSCULAR; INTRAVENOUS AS NEEDED
Status: DISCONTINUED | OUTPATIENT
Start: 2022-04-27 | End: 2022-04-27 | Stop reason: SURG

## 2022-04-27 RX ORDER — DEXAMETHASONE SODIUM PHOSPHATE 4 MG/ML
INJECTION, SOLUTION INTRA-ARTICULAR; INTRALESIONAL; INTRAMUSCULAR; INTRAVENOUS; SOFT TISSUE AS NEEDED
Status: DISCONTINUED | OUTPATIENT
Start: 2022-04-27 | End: 2022-04-27 | Stop reason: SURG

## 2022-04-27 RX ORDER — DEXMEDETOMIDINE HYDROCHLORIDE 100 UG/ML
INJECTION, SOLUTION INTRAVENOUS AS NEEDED
Status: DISCONTINUED | OUTPATIENT
Start: 2022-04-27 | End: 2022-04-27 | Stop reason: SURG

## 2022-04-27 RX ORDER — SULFAMETHOXAZOLE AND TRIMETHOPRIM 800; 160 MG/1; MG/1
1 TABLET ORAL 2 TIMES DAILY
Qty: 6 TABLET | Refills: 0 | Status: SHIPPED | OUTPATIENT
Start: 2022-04-27 | End: 2022-04-30

## 2022-04-27 RX ORDER — SODIUM CHLORIDE, SODIUM LACTATE, POTASSIUM CHLORIDE, CALCIUM CHLORIDE 600; 310; 30; 20 MG/100ML; MG/100ML; MG/100ML; MG/100ML
9 INJECTION, SOLUTION INTRAVENOUS CONTINUOUS PRN
Status: DISCONTINUED | OUTPATIENT
Start: 2022-04-27 | End: 2022-04-27 | Stop reason: HOSPADM

## 2022-04-27 RX ORDER — LIDOCAINE HYDROCHLORIDE 20 MG/ML
INJECTION, SOLUTION EPIDURAL; INFILTRATION; INTRACAUDAL; PERINEURAL AS NEEDED
Status: DISCONTINUED | OUTPATIENT
Start: 2022-04-27 | End: 2022-04-27 | Stop reason: SURG

## 2022-04-27 RX ORDER — MIDAZOLAM HYDROCHLORIDE 1 MG/ML
2 INJECTION INTRAMUSCULAR; INTRAVENOUS ONCE
Status: COMPLETED | OUTPATIENT
Start: 2022-04-27 | End: 2022-04-27

## 2022-04-27 RX ORDER — NALOXONE HYDROCHLORIDE 0.4 MG/ML
INJECTION, SOLUTION INTRAMUSCULAR; INTRAVENOUS; SUBCUTANEOUS AS NEEDED
Status: DISCONTINUED | OUTPATIENT
Start: 2022-04-27 | End: 2022-04-27 | Stop reason: SURG

## 2022-04-27 RX ORDER — PROMETHAZINE HYDROCHLORIDE 12.5 MG/1
25 TABLET ORAL ONCE AS NEEDED
Status: DISCONTINUED | OUTPATIENT
Start: 2022-04-27 | End: 2022-04-27 | Stop reason: HOSPADM

## 2022-04-27 RX ORDER — METOPROLOL TARTRATE 5 MG/5ML
INJECTION INTRAVENOUS AS NEEDED
Status: DISCONTINUED | OUTPATIENT
Start: 2022-04-27 | End: 2022-04-27 | Stop reason: SURG

## 2022-04-27 RX ORDER — CLINDAMYCIN PHOSPHATE 900 MG/50ML
900 INJECTION INTRAVENOUS ONCE
Status: COMPLETED | OUTPATIENT
Start: 2022-04-27 | End: 2022-04-27

## 2022-04-27 RX ORDER — PROMETHAZINE HYDROCHLORIDE 25 MG/1
25 SUPPOSITORY RECTAL ONCE AS NEEDED
Status: DISCONTINUED | OUTPATIENT
Start: 2022-04-27 | End: 2022-04-27 | Stop reason: HOSPADM

## 2022-04-27 RX ORDER — OXYCODONE HYDROCHLORIDE 5 MG/1
5 TABLET ORAL
Status: DISCONTINUED | OUTPATIENT
Start: 2022-04-27 | End: 2022-04-27 | Stop reason: HOSPADM

## 2022-04-27 RX ORDER — PROMETHAZINE HYDROCHLORIDE 12.5 MG/1
12.5 TABLET ORAL EVERY 6 HOURS PRN
Qty: 20 TABLET | Refills: 0 | Status: SHIPPED | OUTPATIENT
Start: 2022-04-27 | End: 2022-05-04

## 2022-04-27 RX ORDER — ALBUTEROL SULFATE 2.5 MG/3ML
SOLUTION RESPIRATORY (INHALATION) AS NEEDED
Status: DISCONTINUED | OUTPATIENT
Start: 2022-04-27 | End: 2022-04-27 | Stop reason: SURG

## 2022-04-27 RX ORDER — ACETAMINOPHEN 500 MG
1000 TABLET ORAL ONCE
Status: COMPLETED | OUTPATIENT
Start: 2022-04-27 | End: 2022-04-27

## 2022-04-27 RX ORDER — PROPOFOL 10 MG/ML
VIAL (ML) INTRAVENOUS AS NEEDED
Status: DISCONTINUED | OUTPATIENT
Start: 2022-04-27 | End: 2022-04-27 | Stop reason: SURG

## 2022-04-27 RX ORDER — ROCURONIUM BROMIDE 10 MG/ML
INJECTION, SOLUTION INTRAVENOUS AS NEEDED
Status: DISCONTINUED | OUTPATIENT
Start: 2022-04-27 | End: 2022-04-27 | Stop reason: SURG

## 2022-04-27 RX ORDER — KETAMINE HYDROCHLORIDE 50 MG/ML
INJECTION, SOLUTION, CONCENTRATE INTRAMUSCULAR; INTRAVENOUS AS NEEDED
Status: DISCONTINUED | OUTPATIENT
Start: 2022-04-27 | End: 2022-04-27 | Stop reason: SURG

## 2022-04-27 RX ORDER — FENTANYL CITRATE 50 UG/ML
25 INJECTION, SOLUTION INTRAMUSCULAR; INTRAVENOUS
Status: DISPENSED | OUTPATIENT
Start: 2022-04-27 | End: 2022-04-27

## 2022-04-27 RX ORDER — GLYCOPYRROLATE 0.2 MG/ML
0.2 INJECTION INTRAMUSCULAR; INTRAVENOUS
Status: COMPLETED | OUTPATIENT
Start: 2022-04-27 | End: 2022-04-27

## 2022-04-27 RX ORDER — ONDANSETRON 2 MG/ML
INJECTION INTRAMUSCULAR; INTRAVENOUS AS NEEDED
Status: DISCONTINUED | OUTPATIENT
Start: 2022-04-27 | End: 2022-04-27 | Stop reason: SURG

## 2022-04-27 RX ORDER — ONDANSETRON 2 MG/ML
4 INJECTION INTRAMUSCULAR; INTRAVENOUS ONCE AS NEEDED
Status: DISCONTINUED | OUTPATIENT
Start: 2022-04-27 | End: 2022-04-27 | Stop reason: HOSPADM

## 2022-04-27 RX ADMIN — DEXMEDETOMIDINE HYDROCHLORIDE 10 MCG: 100 INJECTION, SOLUTION, CONCENTRATE INTRAVENOUS at 14:17

## 2022-04-27 RX ADMIN — SODIUM CHLORIDE, POTASSIUM CHLORIDE, SODIUM LACTATE AND CALCIUM CHLORIDE: 600; 310; 30; 20 INJECTION, SOLUTION INTRAVENOUS at 15:03

## 2022-04-27 RX ADMIN — MIDAZOLAM HYDROCHLORIDE 2 MG: 1 INJECTION, SOLUTION INTRAMUSCULAR; INTRAVENOUS at 13:19

## 2022-04-27 RX ADMIN — FENTANYL CITRATE 50 MCG: 50 INJECTION, SOLUTION INTRAMUSCULAR; INTRAVENOUS at 13:31

## 2022-04-27 RX ADMIN — ROCURONIUM BROMIDE 20 MG: 10 INJECTION INTRAVENOUS at 14:17

## 2022-04-27 RX ADMIN — DEXMEDETOMIDINE HYDROCHLORIDE 15 MCG: 100 INJECTION, SOLUTION, CONCENTRATE INTRAVENOUS at 13:56

## 2022-04-27 RX ADMIN — PROPOFOL 150 MG: 10 INJECTION, EMULSION INTRAVENOUS at 13:31

## 2022-04-27 RX ADMIN — ROCURONIUM BROMIDE 10 MG: 10 INJECTION INTRAVENOUS at 13:46

## 2022-04-27 RX ADMIN — ACETAMINOPHEN 1000 MG: 500 TABLET ORAL at 13:18

## 2022-04-27 RX ADMIN — DEXMEDETOMIDINE HYDROCHLORIDE 10 MCG: 100 INJECTION, SOLUTION, CONCENTRATE INTRAVENOUS at 14:13

## 2022-04-27 RX ADMIN — DEXAMETHASONE SODIUM PHOSPHATE 4 MG: 4 INJECTION, SOLUTION INTRA-ARTICULAR; INTRALESIONAL; INTRAMUSCULAR; INTRAVENOUS; SOFT TISSUE at 13:43

## 2022-04-27 RX ADMIN — ONDANSETRON 4 MG: 2 INJECTION INTRAMUSCULAR; INTRAVENOUS at 15:09

## 2022-04-27 RX ADMIN — KETAMINE HYDROCHLORIDE 25 MG: 50 INJECTION, SOLUTION INTRAMUSCULAR; INTRAVENOUS at 13:46

## 2022-04-27 RX ADMIN — ROCURONIUM BROMIDE 20 MG: 10 INJECTION INTRAVENOUS at 14:13

## 2022-04-27 RX ADMIN — CLINDAMYCIN IN 5 PERCENT DEXTROSE 900 MG: 18 INJECTION, SOLUTION INTRAVENOUS at 13:35

## 2022-04-27 RX ADMIN — OXYCODONE HYDROCHLORIDE 5 MG: 5 TABLET ORAL at 16:45

## 2022-04-27 RX ADMIN — FENTANYL CITRATE 25 MCG: 50 INJECTION, SOLUTION INTRAMUSCULAR; INTRAVENOUS at 15:56

## 2022-04-27 RX ADMIN — ROCURONIUM BROMIDE 20 MG: 10 INJECTION INTRAVENOUS at 14:40

## 2022-04-27 RX ADMIN — LIDOCAINE HYDROCHLORIDE 70 MG: 20 INJECTION, SOLUTION EPIDURAL; INFILTRATION; INTRACAUDAL; PERINEURAL at 15:10

## 2022-04-27 RX ADMIN — MEPERIDINE HYDROCHLORIDE 12.5 MG: 25 INJECTION INTRAMUSCULAR; INTRAVENOUS; SUBCUTANEOUS at 15:41

## 2022-04-27 RX ADMIN — ALBUTEROL SULFATE 2.5 MG: 2.5 SOLUTION RESPIRATORY (INHALATION) at 15:26

## 2022-04-27 RX ADMIN — NALOXONE HYDROCHLORIDE 0.04 MG: 0.4 INJECTION, SOLUTION INTRAMUSCULAR; INTRAVENOUS; SUBCUTANEOUS at 15:26

## 2022-04-27 RX ADMIN — METOPROLOL TARTRATE 5 MG: 5 INJECTION INTRAVENOUS at 13:47

## 2022-04-27 RX ADMIN — SODIUM CHLORIDE, POTASSIUM CHLORIDE, SODIUM LACTATE AND CALCIUM CHLORIDE 9 ML/HR: 600; 310; 30; 20 INJECTION, SOLUTION INTRAVENOUS at 13:19

## 2022-04-27 RX ADMIN — ROCURONIUM BROMIDE 10 MG: 10 INJECTION INTRAVENOUS at 13:53

## 2022-04-27 RX ADMIN — SUGAMMADEX 200 MG: 100 INJECTION, SOLUTION INTRAVENOUS at 15:10

## 2022-04-27 RX ADMIN — LIDOCAINE HYDROCHLORIDE 80 MG: 20 INJECTION, SOLUTION EPIDURAL; INFILTRATION; INTRACAUDAL; PERINEURAL at 13:31

## 2022-04-27 RX ADMIN — PROPOFOL 200 MCG/KG/MIN: 10 INJECTION, EMULSION INTRAVENOUS at 13:36

## 2022-04-27 RX ADMIN — DEXMEDETOMIDINE HYDROCHLORIDE 15 MCG: 100 INJECTION, SOLUTION, CONCENTRATE INTRAVENOUS at 13:51

## 2022-04-27 RX ADMIN — KETAMINE HYDROCHLORIDE 25 MG: 50 INJECTION, SOLUTION INTRAMUSCULAR; INTRAVENOUS at 14:41

## 2022-04-27 RX ADMIN — FENTANYL CITRATE 50 MCG: 50 INJECTION, SOLUTION INTRAMUSCULAR; INTRAVENOUS at 14:13

## 2022-04-27 RX ADMIN — GLYCOPYRROLATE 0.2 MG: 0.2 INJECTION INTRAMUSCULAR; INTRAVENOUS at 13:19

## 2022-04-27 RX ADMIN — ROCURONIUM BROMIDE 40 MG: 10 INJECTION INTRAVENOUS at 13:32

## 2022-04-27 NOTE — ANESTHESIA POSTPROCEDURE EVALUATION
Patient: Margy Jimenez    Procedure Summary     Date: 04/27/22 Room / Location: [facility] OR  / [facility]    Anesthesia Start: 1325 Anesthesia Stop: 1538    Procedure: BREAST RECONSTRUCTION REVISION  with bilateral skin resection, bilateral nipple reconstruction. (Bilateral Breast) Diagnosis:       Breast asymmetry      (Breast asymmetry [N64.89])    Surgeons: Neena Pires MD Provider: Norma Lay MD    Anesthesia Type: general ASA Status: 2          Anesthesia Type: general    Vitals  Vitals Value Taken Time   /68 04/27/22 1548   Temp 36.1 °C (97 °F) 04/27/22 1544   Pulse 88 04/27/22 1550   Resp 19 04/27/22 1544   SpO2 93 % 04/27/22 1550   Vitals shown include unvalidated device data.        Post Anesthesia Care and Evaluation    Patient location during evaluation: bedside  Patient participation: complete - patient participated  Level of consciousness: awake  Pain score: 0  Pain management: adequate  Airway patency: patent  Anesthetic complications: No anesthetic complications  PONV Status: none  Cardiovascular status: acceptable and stable  Respiratory status: acceptable and room air  Hydration status: acceptable    Comments: An Anesthesiologist personally participated in the most demanding procedures (including induction and emergence if applicable) in the anesthesia plan, monitored the course of anesthesia administration at frequent intervals and remained physically present and available for immediate diagnosis and treatment of emergencies.

## 2022-04-27 NOTE — ANESTHESIA PREPROCEDURE EVALUATION
Anesthesia Evaluation     Patient summary reviewed and Nursing notes reviewed   no history of anesthetic complications:  NPO Solid Status: > 8 hours  NPO Liquid Status: > 2 hours           Airway   Mallampati: II  TM distance: >3 FB  Neck ROM: full  No difficulty expected  Dental      Pulmonary - negative pulmonary ROS and normal exam    breath sounds clear to auscultation  Cardiovascular - normal exam  Exercise tolerance: good (4-7 METS)    Rhythm: regular  Rate: normal    (+) hypertension, hyperlipidemia,       Neuro/Psych  (+) headaches, psychiatric history Depression and Anxiety,    GI/Hepatic/Renal/Endo    (+)  GERD,      Musculoskeletal     Abdominal    Substance History - negative use     OB/GYN negative ob/gyn ROS         Other   arthritis,    history of cancer                    Anesthesia Plan    ASA 2     general   (Patient understands anesthesia not responsible for dental damage.)  intravenous induction     Anesthetic plan, all risks, benefits, and alternatives have been provided, discussed and informed consent has been obtained with: patient.  Use of blood products discussed with patient .   Plan discussed with CRNA.        CODE STATUS:

## 2022-04-27 NOTE — ADDENDUM NOTE
Addendum  created 04/27/22 1644 by Norma Lay MD    Order list changed, Order sets accessed, Pharmacy for encounter modified

## 2022-05-02 NOTE — PROGRESS NOTES
"Post-op Follow-up (1wk post op )            History of Present Illness  Margy Jimenez is a 62 y.o. female who presents to [facility] for 1 wk post op BREAST RECONSTRUCTION REVISION  with bilateral skin resection, bilateral nipple reconstruction.    She does have some concerns with the left breast on how it looks, she states it's been a problem from day 1. It still appears larger.     She does not have any drainage, redness or warmth today.     Subjective      Hydromorphone, Hydromorphone hcl, and Cephalexin  Allergies Reconciled.    Review of Systems   All review of system has been reviewed and it  is negative except the ones note above.     Objective     /86 (BP Location: Right arm)   Pulse 66   Temp 98.2 °F (36.8 °C) (Temporal)   Ht 160 cm (63\")   Wt 95.9 kg (211 lb 6.4 oz)   SpO2 97%   BMI 37.45 kg/m²     Body mass index is 37.45 kg/m².    Physical Exam  Incisions healing, no open areas, left breast still more pronounced than right     Result Review :              Assessment and Plan      Diagnoses and all orders for this visit:    1. Postoperative follow-up (Primary)        Additional Order(s):       Plan:     Continue compression bra, limit upper body activity, rtc 3 weeks    Follow Up     Return for 3wks for 1m post op Breast Recon/Nipple Recon.    Patient was given instructions and counseling regarding her condition. Please see specific information pulled into the AVS if appropriate.     Hoda Espinoza, CECI  05/04/2022      "

## 2022-05-04 ENCOUNTER — OFFICE VISIT (OUTPATIENT)
Dept: PLASTIC SURGERY | Facility: CLINIC | Age: 62
End: 2022-05-04

## 2022-05-04 VITALS
OXYGEN SATURATION: 97 % | WEIGHT: 211.4 LBS | TEMPERATURE: 98.2 F | BODY MASS INDEX: 37.46 KG/M2 | SYSTOLIC BLOOD PRESSURE: 127 MMHG | HEART RATE: 66 BPM | DIASTOLIC BLOOD PRESSURE: 86 MMHG | HEIGHT: 63 IN

## 2022-05-04 DIAGNOSIS — Z09 POSTOPERATIVE FOLLOW-UP: Primary | ICD-10-CM

## 2022-05-04 PROCEDURE — 99024 POSTOP FOLLOW-UP VISIT: CPT | Performed by: NURSE PRACTITIONER

## 2022-05-12 RX ORDER — PANTOPRAZOLE SODIUM 40 MG/1
TABLET, DELAYED RELEASE ORAL
Qty: 90 TABLET | Refills: 1 | OUTPATIENT
Start: 2022-05-12

## 2022-05-19 ENCOUNTER — TELEPHONE (OUTPATIENT)
Dept: ONCOLOGY | Facility: OTHER | Age: 62
End: 2022-05-19

## 2022-05-19 ENCOUNTER — APPOINTMENT (OUTPATIENT)
Dept: ONCOLOGY | Facility: HOSPITAL | Age: 62
End: 2022-05-19

## 2022-05-24 ENCOUNTER — APPOINTMENT (OUTPATIENT)
Dept: OCCUPATIONAL THERAPY | Facility: HOSPITAL | Age: 62
End: 2022-05-24

## 2022-05-25 ENCOUNTER — HOSPITAL ENCOUNTER (OUTPATIENT)
Dept: OCCUPATIONAL THERAPY | Facility: HOSPITAL | Age: 62
Setting detail: THERAPIES SERIES
Discharge: HOME | End: 2022-05-25

## 2022-05-25 DIAGNOSIS — Z91.89 AT RISK FOR LYMPHEDEMA: Primary | ICD-10-CM

## 2022-05-25 DIAGNOSIS — C50.912 MALIGNANT NEOPLASM OF LEFT BREAST IN FEMALE, ESTROGEN RECEPTOR POSITIVE, UNSPECIFIED SITE OF BREAST: ICD-10-CM

## 2022-05-25 DIAGNOSIS — Z17.0 MALIGNANT NEOPLASM OF LEFT BREAST IN FEMALE, ESTROGEN RECEPTOR POSITIVE, UNSPECIFIED SITE OF BREAST: ICD-10-CM

## 2022-05-25 DIAGNOSIS — L90.5 SCAR CONDITION AND FIBROSIS OF SKIN: ICD-10-CM

## 2022-05-25 DIAGNOSIS — R52 PAIN: ICD-10-CM

## 2022-05-25 PROCEDURE — 93702 BIS XTRACELL FLUID ANALYSIS: CPT

## 2022-05-25 PROCEDURE — 97535 SELF CARE MNGMENT TRAINING: CPT

## 2022-05-25 PROCEDURE — 97140 MANUAL THERAPY 1/> REGIONS: CPT

## 2022-05-25 NOTE — THERAPY RE-EVALUATION
Outpatient Occupational Therapy Lymphedema Re-Evaluation   [place]     Patient Name: Margy Jimenez  : 1960  MRN: [id number]  Today's Date: 2022      Visit Date: 2022    Patient Active Problem List   Diagnosis   • High blood pressure   • Mood disorder (HCC)   • Anxiety   • Biceps tendinitis, right   • Fatigue   • GERD (gastroesophageal reflux disease)   • Hyperlipidemia   • Migraine   • Nontraumatic incomplete tear of right rotator cuff   • Pain and swelling of right shoulder   • S/P rotator cuff repair   • Shoulder impingement, right   • Hypertension   • Depression   • Gastroesophageal reflux disease   • Heartburn   • Malignant neoplasm of left breast in female, estrogen receptor positive (HCC)   • S/P bilateral mastectomy   • Status post bilateral breast reconstruction with implant placement   • Postoperative follow-up   • Contour irregularity in reconstructed breast   • Breast asymmetry        Past Medical History:   Diagnosis Date   • Cancer (HCC)     LEFT BREAST. NO BP OR NEEDLE STICKS IN LEFT ARM.  DID NOT REQUIRE CHEMO OR RADIATION   • Depression    • GERD (gastroesophageal reflux disease)    • High blood pressure    • Hyperlipidemia    • Neoplasm of left breast    • S/P bilateral mastectomy 2021   • Status post bilateral breast reconstruction with implant placement 2021        Past Surgical History:   Procedure Laterality Date   • ABDOMINAL HYSTERECTOMY     • BREAST AUGMENTATION Bilateral 2021    Procedure: BILATERAL BREAST RECONSTRUCTION WITH IMPLANTS, IMPLANT OF BIOLOGICAL MESH,  USE OF SPY, AND BILATERAL CHEST LIPOSUCTION;  Surgeon: Nenea Pires MD;  [place] [facility] [place] [facility] [place]  Service: [place];  Laterality: Bilateral;   • BREAST BIOPSY Left 10/26/2021    Procedure: LEFT BREAST LUMPECTOMY WITH SENTINEL NODE BIOPSY AND NEEDLE LOCALIZATION;  Surgeon: Ayah Chaparro MD;  Location: [facility] OR;  Service: General;  Laterality: Left;   • BREAST  RECONSTRUCTION Bilateral 4/27/2022    Procedure: BREAST RECONSTRUCTION REVISION  with bilateral skin resection, bilateral nipple reconstruction.;  Surgeon: Neena Pires MD;  [place] [facility] [place] [facility] [place]  Service: Plastics;  Laterality: Bilateral;   • BUNIONECTOMY     • COLONOSCOPY      aprrox 2018    • COLONOSCOPY N/A 8/31/2021    Procedure: COLONOSCOPY WITH INJECTION ORISE/POLYPECTOMY HOT SNARE/ENDO CLIP X3;  Surgeon: David Hernandez MD;  Location: [facility] ENDOSCOPY;  Service: Gastroenterology;  Laterality: N/A;  COLON POLYP   • CYSTOSCOPY     • ENDOSCOPY     • ENDOSCOPY N/A 8/31/2021    Procedure: ESOPHAGOGASTRODUODENOSCOPY WITH BIOPSY;  Surgeon: David Hernandez MD;  Location: [facility] ENDOSCOPY;  Service: Gastroenterology;  Laterality: N/A;  GASTRIC POLYPS/REFLUX ESOPHAGITIS   • LAPAROSCOPIC CHOLECYSTECTOMY     • MASTECTOMY Bilateral 12/7/2021    Procedure: MASTECTOMY;  Surgeon: Ayah Chaparro MD;  Location: [facility] [place] [facility] [place];  Service: General;  Laterality: Bilateral;   • ROTATOR CUFF REPAIR Right          Visit Dx:     ICD-10-CM ICD-9-CM   1. At risk for lymphedema  Z91.89 V49.89   2. Malignant neoplasm of left breast in female, estrogen receptor positive, unspecified site of breast (HCC)  C50.912 174.9    Z17.0 V86.0   3. Pain  R52 780.96   4. Scar condition and fibrosis of skin  L90.5 709.2            Lymphedema     Row Name 05/25/22 1700             Subjective Pain    Able to rate subjective pain? yes  -CH      Pre-Treatment Pain Level 0  -CH      Post-Treatment Pain Level 0  -CH              Lymphedema Assessment    Lymphedema Classification LUE:;at risk/stage 0  -CH      Lymphedema Cancer Related Sx bilateral;simple mastectomy;sentinel node biopsy  -CH      Lymph Nodes Removed # 4  -CH      Positive Lymph Nodes # 0  -CH      Chemo Received yes  -CH      Chemo Treatments #/Timeframe Anastrozole  -CH      Radiation Therapy Received no  -CH              LLIS - Physical Concerns    The amount of  "pain associated with my lymphedema is: 0  -CH      The amount of limb heaviness associated with my lymphedema is: 0  -CH      The amount of skin tightness associated with my lymphedema is: 0  -CH      The size of my swollen limb(s) seems: 0  -CH      Lymphedema affects the movement of my swollen limb(s): 0  -CH      The strength in my swollen limb(s) is: 0  -CH              LLIS - Psychosocial Concerns    Lymphedema affects my body image (i.e., \"how I think I look\"). 0  -CH      Lymphedema affects my socializing with others. 0  -CH      Lymphedema affects my intimate relations with spouse or partner (rate 0 if not applicable 0  -CH      Lymphedema \"gets me down\" (i.e., depression, frustration, or anger) 0  -CH      I must rely on others for help due to my lymphedema. 0  -CH      I know what to do to manage my lymphedema 1  -CH              LLIS - Functional Concerns    Lymphedema affects my ability to perform self-care activities (i.e. eating, dressing, hygiene) 0  -CH      Lymphedema affects my ability to perform routine home or work-related activities. 0  -CH      Lymphedema affects my performance of preferred leisure activities. 0  -CH      Lymphedema affects proper fit of clothing/shoes 0  -CH      Lymphedema affects my sleep 0  -CH              Posture/Observations    Posture- WNL Posture is WNL  -CH              General ROM    GENERAL ROM COMMENTS B UE WFL  -CH              Skin Changes/Observations    Location/Assessment Upper Quadrant  -CH      Upper Quadrant Conditions bilateral:;clean;intact;scab(s);fragile  -CH      Upper Quadrant Color/Pigment right:;erythema;fibrosis;left:  -CH      Skin Observations Comment Patient is noted with erythema around the right nipple reconstruction site with visible stitches present.  No heat nor pain is noted.  Patient is also demonstrating multiple sites where scabbing has occurred along the mastectomy incision.  Patient also noted with scar tissue fibrosis/bulking at the " end of the left mastectomy incision site.  -CH              Manual Lymphatic Drainage    Manual Therapy OT provided patient with silicone scar sheeting to the right mastectomy incision site.  -CH              L-Dex Bioimpedence Screening    L-Dex Measurement Extremity LUE  -CH      L-Dex Patient Position Standing  -CH      L-Dex UE Dominate Side Right  -CH      L-Dex UE At Risk Side Left  -CH      L-Dex UE Pre Surgical Value Yes  -CH      L-Dex UE Score 5.6  -CH      L-Dex UE Baseline Score 2.2  -CH      L-Dex UE Value Change 3.4  -CH      $ L-Dex Charge yes  -CH              Lymphedema Life Impact Scale Totals    A.  Total Q1 - Q17 (Do not include Q18) 1  -CH      B.  Total number of questions answered (Q1-Q17) 17  -CH      C. Divide A by B 0.06  -CH      D. Multiple C by 25 1.5  -CH            User Key  (r) = Recorded By, (t) = Taken By, (c) = Cosigned By    Initials Name Provider Type    Lenore Mendieta OT Occupational Therapist                        Therapy Education  Education Details: OT provided patient with education on benefit silicone scar sheeting to address scar tissue.  OT educated patient on where to purchase this.      Manual Rx (last 36 hours)     Manual Treatments     Row Name 05/25/22 1818             Total Minutes    62969 - OT Manual Therapy Minutes 10  -CH            User Key  (r) = Recorded By, (t) = Taken By, (c) = Cosigned By    Initials Name Provider Type    Lenore Mendieta OT Occupational Therapist               OT Goals     Row Name 05/25/22 1818          Time Calculation    OT Goal Re-Cert Due Date 06/24/22  -           User Key  (r) = Recorded By, (t) = Taken By, (c) = Cosigned By    Initials Name Provider Type    Lenroe Mendieta OT Occupational Therapist              GOALS:  1. Post Breast Surgery Care/at risk for Lymphedema  LTG 1: 90 days:  As an indicator of no exacerbation of lymphedema staging, the patient will present with a total lymphatic volume less than 5% from   baseline.              STATUS: Met:discontinue     LTG 1b: 90 days:  As an indicator of no exacerbation of lymphedema staging, the patient will present with an L-Dex score less than 7 points from preoperative baseline.              STATUS: New              STG 1a:   30 days: To prevent exacerbation of mixed edema to lymphedema, patient will utilize the 2 postsurgical compression garments daily.                 STATUS: Met:  Discontinue  STG 1b: 30 days: Patient will be independent with self-manual lymphatic massage.               STATUS: Met: ongoing  STG 1c: 30 days:  Patient will be independent with identification of signs and symptoms of lymphedema exasperation per stoplight to recovery education handout.              STATUS: Partially met: ongoing  STG 1 d: 30 days: Patient will be independent with HEP to prevent advancement in lymphedema staging.              STATUS: Met: ongoing  TREATMENT:  Self Care/ADL retraining, Therapeutic Activity, Neuromuscular Re-education, Therapeutic Exercise, Bioimpedence Fluid Analysis, Post-Surgical compression garement [id number] [employer] Zip-ST-High, Orthotic Management and training,  and Manual Therapy.     OT Assessment/Plan     Row Name 05/25/22 1817          OT Assessment    Functional Limitations Performance in self-care ADL;Other (comment)  -CH     Impairments Impaired lymphatic circulation;Pain  -CH     Assessment Comments Patient is demonstrating normalized lymphatic functioning this date.  Patient does have asymmetry in chest wall appearance and will be speaking with her plastic surgeon which may result in surgical revision of the reconstruction which would require patient to be reevaluated for lymphatic functioning 3 weeks after her surgery.  If this does not occur patient can transition to normal 3-month look out.  Patient will benefit from skilled occupational therapy services to continue to evaluate ongoing lymphatic functioning to prevent advancement in lymphedema  staging.  -     OT Rehab Potential Good  -     Patient/caregiver participated in establishment of treatment plan and goals Yes  -     Patient would benefit from skilled therapy intervention Yes  -            OT Plan    OT Frequency Other (comment)  See duration  -     Predicted Duration of Therapy Intervention (OT) Pt. to re-evaluated 3 weeks post-surgery, 3 weeks post XRT, every 3 months from baseline for years 1-3 and every 6 months years 4 and 5 .  -     Planned CPT's? OT RE-EVAL: 61923;OT NEUROMUSC RE EDUCATION EA 15 MIN: 70900;OT SELF CARE/MGMT/TRAIN 15 MIN: 12358;OT HOT/COLD PACK;OT ULTRASOUND EA 15 MIN: 73957;OT MANUAL THERAPY EA 15 MIN: 77315;OT BIS XTRACELL FLUID ANALYSIS: 61482;OT ORTHOTIC MGMT/TRAIN EA 15 MIN: 05621;OT ORTHO/PROSTHET CHECKOUT EA 15 MIN: 15636  -     Planned Therapy Interventions (Optional Details) prosthetic fitting/training;patient/family education;postural re-education;ROM (Range of Motion);orthotic fitting/training;manual therapy techniques;home exercise program;strengthening;stretching  -           User Key  (r) = Recorded By, (t) = Taken By, (c) = Cosigned By    Initials Name Provider Type     Lenore Rider OT Occupational Therapist                          Time Calculation:   Timed Charges  13466 - OT Manual Therapy Minutes: 10  Total Minutes  Timed Charges Total Minutes: 10   Total Minutes: 10     Therapy Charges for Today     Code Description Service Date Service Provider Modifiers Qty    18388148558 HC PT BIS XTRACELL FLUID ANALYSIS 5/25/2022 Lenore Rider OT  1    57339664647 HC OT SELF CARE/MGMT/TRAIN EA 15 MIN 5/25/2022 Lenore Rider OT GO 1    39297061293 HC OT MANUAL THERAPY EA 15 MIN 5/25/2022 Lenore Rider OT GO 1                    Lenore Rider OT  5/25/2022

## 2022-05-26 ENCOUNTER — APPOINTMENT (OUTPATIENT)
Dept: ONCOLOGY | Facility: HOSPITAL | Age: 62
End: 2022-05-26

## 2022-05-31 ENCOUNTER — APPOINTMENT (OUTPATIENT)
Dept: ONCOLOGY | Facility: HOSPITAL | Age: 62
End: 2022-05-31

## 2022-06-02 ENCOUNTER — OFFICE VISIT (OUTPATIENT)
Dept: PLASTIC SURGERY | Facility: CLINIC | Age: 62
End: 2022-06-02

## 2022-06-02 VITALS — WEIGHT: 211 LBS | BODY MASS INDEX: 37.39 KG/M2 | TEMPERATURE: 98.7 F | HEIGHT: 63 IN

## 2022-06-02 DIAGNOSIS — Z09 POSTOPERATIVE FOLLOW-UP: Primary | ICD-10-CM

## 2022-06-02 DIAGNOSIS — N65.0 CONTOUR IRREGULARITY IN RECONSTRUCTED BREAST: ICD-10-CM

## 2022-06-02 DIAGNOSIS — N64.89 BREAST ASYMMETRY: ICD-10-CM

## 2022-06-02 PROCEDURE — 99024 POSTOP FOLLOW-UP VISIT: CPT | Performed by: NURSE PRACTITIONER

## 2022-06-28 ENCOUNTER — PREP FOR SURGERY (OUTPATIENT)
Dept: OTHER | Facility: HOSPITAL | Age: 62
End: 2022-06-28

## 2022-06-28 DIAGNOSIS — N65.0 CONTOUR IRREGULARITY IN RECONSTRUCTED BREAST: Primary | ICD-10-CM

## 2022-06-28 RX ORDER — CLINDAMYCIN PHOSPHATE 900 MG/50ML
900 INJECTION INTRAVENOUS ONCE
Status: CANCELLED | OUTPATIENT
Start: 2022-06-28 | End: 2022-06-28

## 2022-08-03 RX ORDER — ANASTROZOLE 1 MG/1
TABLET ORAL
Qty: 90 TABLET | Refills: 1 | Status: SHIPPED | OUTPATIENT
Start: 2022-08-03 | End: 2022-11-29

## 2022-08-17 ENCOUNTER — OFFICE VISIT (OUTPATIENT)
Dept: PLASTIC SURGERY | Facility: CLINIC | Age: 62
End: 2022-08-17

## 2022-08-17 VITALS
HEIGHT: 63 IN | OXYGEN SATURATION: 100 % | BODY MASS INDEX: 37.39 KG/M2 | HEART RATE: 80 BPM | SYSTOLIC BLOOD PRESSURE: 134 MMHG | DIASTOLIC BLOOD PRESSURE: 89 MMHG | WEIGHT: 211 LBS

## 2022-08-17 DIAGNOSIS — N65.0 CONTOUR IRREGULARITY IN RECONSTRUCTED BREAST: Primary | ICD-10-CM

## 2022-08-17 PROCEDURE — 99212 OFFICE O/P EST SF 10 MIN: CPT | Performed by: SURGERY

## 2022-08-17 NOTE — PROGRESS NOTES
"Chief Complaint  Follow-up (Right breast pain and possible implant flipped )    Subjective          History of Present Illness  Margy Jimenez is a 62 y.o. female who presents to [facility] for Postoperative Follow-Up of breast recon/ pt right breast has pain and possible implant flipped. A couple weeks ago she noticed this     Allergies: Hydromorphone, Hydromorphone hcl, and Cephalexin  Allergies Reconciled.    Review of Systems   All review of system has been reviewed and it  is negative except the ones note above.     Objective     /89 (BP Location: Right arm, Patient Position: Sitting)   Pulse 80   Ht 160 cm (62.99\")   Wt 95.7 kg (211 lb)   SpO2 100%   BMI 37.39 kg/m²     Body mass index is 37.39 kg/m².    Physical Exam Right Implant has flipped        Result Review :                Assessment and Plan      Diagnoses and all orders for this visit:    1. Contour irregularity in reconstructed breast (Primary)        Plan:  • Massage at home to try & flip implant, patient was shown how. If unable to get implant to flip then will have to take back to surgery. She is scheduled in December for surgery already so at that time she would like to have implants replaced and be smaller & narrow. Also adding fat grafting to her procedure    I spent 15 minutes caring for Margy on this date of service. This time includes time spent by me in the following activities:performing a medically appropriate examination and/or evaluation  and documenting information in the medical record    Follow Up     No follow-ups on file.    Patient was given instructions and counseling regarding her condition. Please see specific information pulled into the AVS if appropriate.     Neena Pires MD  08/17/2022    "

## 2022-09-23 ENCOUNTER — HOSPITAL ENCOUNTER (OUTPATIENT)
Dept: OCCUPATIONAL THERAPY | Facility: HOSPITAL | Age: 62
Setting detail: THERAPIES SERIES
Discharge: HOME | End: 2022-09-23

## 2022-09-23 DIAGNOSIS — R52 PAIN: ICD-10-CM

## 2022-09-23 DIAGNOSIS — Z17.0 MALIGNANT NEOPLASM OF LEFT BREAST IN FEMALE, ESTROGEN RECEPTOR POSITIVE, UNSPECIFIED SITE OF BREAST: ICD-10-CM

## 2022-09-23 DIAGNOSIS — C50.912 MALIGNANT NEOPLASM OF LEFT BREAST IN FEMALE, ESTROGEN RECEPTOR POSITIVE, UNSPECIFIED SITE OF BREAST: ICD-10-CM

## 2022-09-23 DIAGNOSIS — Z91.89 AT RISK FOR LYMPHEDEMA: Primary | ICD-10-CM

## 2022-09-23 DIAGNOSIS — L90.5 SCAR CONDITION AND FIBROSIS OF SKIN: ICD-10-CM

## 2022-09-23 PROCEDURE — 97535 SELF CARE MNGMENT TRAINING: CPT

## 2022-09-23 PROCEDURE — 93702 BIS XTRACELL FLUID ANALYSIS: CPT

## 2022-09-23 NOTE — THERAPY RE-EVALUATION
Outpatient Occupational Therapy Lymphedema Re-Evaluation   [place]     Patient Name: Margy Jimenez  : 1960  MRN: [id number]  Today's Date: 2022      Visit Date: 2022    Patient Active Problem List   Diagnosis   • High blood pressure   • Mood disorder (HCC)   • Anxiety   • Biceps tendinitis, right   • Fatigue   • GERD (gastroesophageal reflux disease)   • Hyperlipidemia   • Migraine   • Nontraumatic incomplete tear of right rotator cuff   • Pain and swelling of right shoulder   • S/P rotator cuff repair   • Shoulder impingement, right   • Hypertension   • Depression   • Gastroesophageal reflux disease   • Heartburn   • Malignant neoplasm of left breast in female, estrogen receptor positive (HCC)   • S/P bilateral mastectomy   • Status post bilateral breast reconstruction with implant placement   • Postoperative follow-up   • Contour irregularity in reconstructed breast   • Breast asymmetry        Past Medical History:   Diagnosis Date   • Cancer (HCC)     LEFT BREAST. NO BP OR NEEDLE STICKS IN LEFT ARM.  DID NOT REQUIRE CHEMO OR RADIATION   • Depression    • GERD (gastroesophageal reflux disease)    • High blood pressure    • Hyperlipidemia    • Neoplasm of left breast    • S/P bilateral mastectomy 2021   • Status post bilateral breast reconstruction with implant placement 2021        Past Surgical History:   Procedure Laterality Date   • ABDOMINAL HYSTERECTOMY     • BREAST AUGMENTATION Bilateral 2021    Procedure: BILATERAL BREAST RECONSTRUCTION WITH IMPLANTS, IMPLANT OF BIOLOGICAL MESH,  USE OF SPY, AND BILATERAL CHEST LIPOSUCTION;  Surgeon: Neena Pires MD;  [facility] [place] [facility];  Service: Plastics;  Laterality: Bilateral;   • BREAST BIOPSY Left 10/26/2021    Procedure: LEFT BREAST LUMPECTOMY WITH SENTINEL NODE BIOPSY AND NEEDLE LOCALIZATION;  Surgeon: Ayah Chaparro MD;  Location: [facility] OR;  Service: General;  Laterality: Left;   • BREAST  RECONSTRUCTION Bilateral 4/27/2022    Procedure: BREAST RECONSTRUCTION REVISION  with bilateral skin resection, bilateral nipple reconstruction.;  Surgeon: Neena Pires MD;  Location: [facility] OR [facility];  Service: Plastics;  Laterality: Bilateral;   • BUNIONECTOMY     • COLONOSCOPY      aprrox 2018    • COLONOSCOPY N/A 8/31/2021    Procedure: COLONOSCOPY WITH INJECTION ORISE/POLYPECTOMY HOT SNARE/ENDO CLIP X3;  Surgeon: David Hernandez MD;  Location: [facility] ENDOSCOPY;  Service: Gastroenterology;  Laterality: N/A;  COLON POLYP   • CYSTOSCOPY     • ENDOSCOPY     • ENDOSCOPY N/A 8/31/2021    Procedure: ESOPHAGOGASTRODUODENOSCOPY WITH BIOPSY;  Surgeon: David Hernandez MD;  Location: [facility] ENDOSCOPY;  Service: Gastroenterology;  Laterality: N/A;  GASTRIC POLYPS/REFLUX ESOPHAGITIS   • LAPAROSCOPIC CHOLECYSTECTOMY     • MASTECTOMY Bilateral 12/7/2021    Procedure: MASTECTOMY;  Surgeon: Ayah Chaparro MD;  Location: [facility] OR [facility];  Service: General;  Laterality: Bilateral;   • ROTATOR CUFF REPAIR Right          Visit Dx:     ICD-10-CM ICD-9-CM   1. At risk for lymphedema  Z91.89 V49.89   2. Malignant neoplasm of left breast in female, estrogen receptor positive, unspecified site of breast (HCC)  C50.912 174.9    Z17.0 V86.0   3. Pain  R52 780.96   4. Scar condition and fibrosis of skin  L90.5 709.2            Lymphedema     Row Name 09/23/22 1600             Subjective Pain    Able to rate subjective pain? yes  -MP      Pre-Treatment Pain Level 0  -MP      Post-Treatment Pain Level 0  -MP      Subjective Pain Comment Patient had revision of implants April of this year. She states the implant R has flipped and the L is having problems with the mesh on the L. She is scheduled for 2nd revision on Dec 6, 2022.  -MP              Lymphedema Assessment    Lymphedema Classification LUE:;at risk/stage 0  -MP      Lymphedema Cancer Related Sx bilateral;simple mastectomy;sentinel node biopsy  -MP      Lymph Nodes  "Removed # 4  -MP      Positive Lymph Nodes # 0  -MP      Chemo Received yes  -MP      Chemo Treatments #/Timeframe Anastrozole  -MP      Radiation Therapy Received no  -MP              LLIS - Physical Concerns    The amount of pain associated with my lymphedema is: 0  -MP      The amount of limb heaviness associated with my lymphedema is: 0  -MP      The amount of skin tightness associated with my lymphedema is: 0  -MP      The size of my swollen limb(s) seems: 0  -MP      Lymphedema affects the movement of my swollen limb(s): 0  -MP      The strength in my swollen limb(s) is: 0  -MP              LLIS - Psychosocial Concerns    Lymphedema affects my body image (i.e., \"how I think I look\"). 0  -MP      Lymphedema affects my socializing with others. 0  -MP      Lymphedema affects my intimate relations with spouse or partner (rate 0 if not applicable 0  -MP      Lymphedema \"gets me down\" (i.e., depression, frustration, or anger) 0  -MP      I must rely on others for help due to my lymphedema. 0  -MP              LLIS - Functional Concerns    Lymphedema affects my ability to perform self-care activities (i.e. eating, dressing, hygiene) 0  -MP      Lymphedema affects my ability to perform routine home or work-related activities. 0  -MP      Lymphedema affects my performance of preferred leisure activities. 0  -MP      Lymphedema affects proper fit of clothing/shoes 0  -MP      Lymphedema affects my sleep 0  -MP              Posture/Observations    Posture- WNL Posture is WNL  -MP              General ROM    GENERAL ROM COMMENTS B UE WFL  -MP              Skin Changes/Observations    Location/Assessment Upper Quadrant  -MP      Upper Quadrant Conditions bilateral:;clean;dry  -MP      Upper Quadrant Color/Pigment normal  -MP      Skin Observations Comment scars are well healed with no concerns. shape of the right breast is somewhat square and is carried higher than the left. Patient reports that she was told that the " prosthetic had flipped. She is scheduled for revision.  -MP              Lymphedema Sensation    Lymphedema Sensation Comments lateral right and left chest walls with diminished sensation compared to surrounding skin  -MP              Lymphedema Measurements    Measurement Type(s) Volumetric  -MP              L-Dex Bioimpedence Screening    L-Dex Measurement Extremity LUE  -MP      L-Dex Patient Position Standing  -MP      L-Dex UE Dominate Side Right  -MP      L-Dex UE At Risk Side Left  -MP      L-Dex UE Pre Surgical Value Yes  -MP      L-Dex UE Score 2.6  -MP      L-Dex UE Baseline Score 2.2  -MP      L-Dex UE Value Change 0.4  -MP      $ L-Dex Charge yes  -MP              Lymphedema Life Impact Scale Totals    A.  Total Q1 - Q17 (Do not include Q18) 0  -MP      B.  Total number of questions answered (Q1-Q17) 16  -MP      C. Divide A by B 0  -MP      D. Multiple C by 25 0  -MP            User Key  (r) = Recorded By, (t) = Taken By, (c) = Cosigned By    Initials Name Provider Type    Dara Connors OT Occupational Therapist                      Therapy Education  Education Details: OT reviewed stop light to recovery tool with patient, who verbalize understanding of all precautions. She was also educated that it is important to continue surveilllence and that next schedule appointment will be appropriate at 3 weeks following revision surgery, as it falls just after the expected 3 month follow-up, and will allow time for healing prior to re-evalution. She is agreeable.  Given: Symptoms/condition management  Program: Reinforced, New  How Provided: Verbal  Provided to: Patient  Level of Understanding: Verbalized  93796 - OT Self Care/Mgmt Minutes: 17         OT Goals     Row Name 09/23/22 2824          Time Calculation    OT Goal Re-Cert Due Date 10/23/22  -MP           User Key  (r) = Recorded By, (t) = Taken By, (c) = Cosigned By    Initials Name Provider Type    Dara Connors OT Occupational Therapist                  OT Assessment/Plan     Row Name 09/23/22 1840          OT Assessment    Functional Limitations Performance in self-care ADL;Other (comment)  -MP     Impairments Impaired lymphatic circulation;Pain  -MP     Assessment Comments Patient demonstrates normalized lymphatic functioning on this date.  She has asymmetry right verses Left breast, and this is to be addressed with revision surgery in December.  Patient will be seen 3 weeks following surgery, as her 3 month follow-up would be approximately that time frame. She will continue to benefit from skilled Occupational therapy to continue to evaluate ongoing lymphatic functioning to prevent advancement in lymphedema staging.  -MP     OT Diagnosis at risk for lymphedema  -MP     OT Rehab Potential Excellent  -MP     Patient/caregiver participated in establishment of treatment plan and goals Yes  -MP     Patient would benefit from skilled therapy intervention Yes  -MP            OT Plan    OT Frequency Other (comment)  -MP     Predicted Duration of Therapy Intervention (OT) continue to re-evaluate every 3months from baseline for years 1-3 and every 6 month years 4 and 5  -MP     Planned CPT's? OT RE-EVAL: 76042;OT NEUROMUSC RE EDUCATION EA 15 MIN: 52903;OT SELF CARE/MGMT/TRAIN 15 MIN: 35727;OT HOT/COLD PACK;OT ULTRASOUND EA 15 MIN: 30585;OT MANUAL THERAPY EA 15 MIN: 63023;OT BIS XTRACELL FLUID ANALYSIS: 48869;OT ORTHOTIC MGMT/TRAIN EA 15 MIN: 13364;OT ORTHO/PROSTHET CHECKOUT EA 15 MIN: 36199  -MP     Planned Therapy Interventions (Optional Details) prosthetic fitting/training;patient/family education;postural re-education;ROM (Range of Motion);orthotic fitting/training;manual therapy techniques;home exercise program;strengthening;stretching  -MP           User Key  (r) = Recorded By, (t) = Taken By, (c) = Cosigned By    Initials Name Provider Type    Dara Connors OT Occupational Therapist                    GOALS:  1. Post Breast Surgery Care/at risk  for Lymphedema  LTG 1: 90 days:  As an indicator of no exacerbation of lymphedema staging, the patient will present with a total lymphatic volume less than 5% from  baseline.              STATUS: Met:discontinue     LTG 1b: 90 days:  As an indicator of no exacerbation of lymphedema staging, the patient will present with an L-Dex score less than 7 points from preoperative baseline.              STATUS: New              STG 1a:   30 days: To prevent exacerbation of mixed edema to lymphedema, patient will utilize the 2 postsurgical compression garments daily.                 STATUS: Met:  Discontinue  STG 1b: 30 days: Patient will be independent with self-manual lymphatic massage.               STATUS: Met: ongoing  STG 1c: 30 days:  Patient will be independent with identification of signs and symptoms of lymphedema exasperation per stoplight to recovery education handout.              STATUS: Partially met: ongoing  STG 1 d: 30 days: Patient will be independent with HEP to prevent advancement in lymphedema staging.              STATUS: Met: ongoing  TREATMENT:  Self Care/ADL retraining, Therapeutic Activity, Neuromuscular Re-education, Therapeutic Exercise, Bioimpedence Fluid Analysis, [facility] [id number], Orthotic Management and training,  and Manual Therapy.    Time Calculation:   Timed Charges  92367 - OT Self Care/Mgmt Minutes: 17  Total Minutes  Timed Charges Total Minutes: 17   Total Minutes: 17     Therapy Charges for Today     Code Description Service Date Service Provider Modifiers Qty    19396071884 HC PT BIS XTRACELL FLUID ANALYSIS 9/23/2022 Dara Valdez OT  1    29604683452 HC OT SELF CARE/MGMT/TRAIN EA 15 MIN 9/23/2022 Dara Valdez OT GO 1                    Dara Valdez OT  9/23/2022

## 2022-11-09 NOTE — PROGRESS NOTES
"Pre-op Exam (Pre op for 12/06/22 )            History of Present Illness  Margy Jimenez is a 62 y.o. female who presents to [facility]IC [facility] RECONSTRU[facility]IVE SURGERY for Pre-Operative Examination for BILATERAL BREAST RECONSTRUCTION REVISION, RIGHT BREAST IMPLANT CAPSULOTOMY, LEFT BREAST SKIN TAILORING AND POSSIBLE IMPLANT EXCHANGE 12/06/22.       Subjective        Hydromorphone, Hydromorphone hcl, and Cephalexin  Allergies Reconciled.    Review of Systems   All review of system has been reviewed and it  is negative except the ones note above.     Objective     /77 (BP Location: Right arm, Patient Position: Sitting)   Pulse 64   Temp 98.9 °F (37.2 °C) (Temporal)   Resp 14   Ht 160 cm (63\")   Wt 92.6 kg (204 lb 3.2 oz)   SpO2 96%   BMI 36.17 kg/m²     Body mass index is 36.17 kg/m².    Physical Exam:   Bilateral breast with excess of skin, implant well positioned.     Result Review :                Assessment and Plan      Diagnoses and all orders for this visit:    1. Contour irregularity in reconstructed breast (Primary)  -     promethazine (PHENERGAN) 12.5 MG tablet; Take 2 tablets by mouth Every 6 (Six) Hours As Needed for Nausea or Vomiting.  Dispense: 20 tablet; Refill: 0  -     clindamycin (Cleocin) 150 MG capsule; Take 1 capsule by mouth 3 (Three) Times a Day.  Dispense: 30 capsule; Refill: 0  -     oxyCODONE-acetaminophen (Percocet) 5-325 MG per tablet; Take 1-2 tablets by mouth Every 6 (Six) Hours As Needed for Moderate Pain. (Patient taking differently: Take 1-2 tablets by mouth Every 6 (Six) Hours As Needed for Moderate Pain (POST OP PAIN MED).)  Dispense: 28 tablet; Refill: 0    2. Breast asymmetry  -     promethazine (PHENERGAN) 12.5 MG tablet; Take 2 tablets by mouth Every 6 (Six) Hours As Needed for Nausea or Vomiting.  Dispense: 20 tablet; Refill: 0  -     clindamycin (Cleocin) 150 MG capsule; Take 1 capsule by mouth 3 (Three) Times a Day.  Dispense: 30 " capsule; Refill: 0  -     oxyCODONE-acetaminophen (Percocet) 5-325 MG per tablet; Take 1-2 tablets by mouth Every 6 (Six) Hours As Needed for Moderate Pain. (Patient taking differently: Take 1-2 tablets by mouth Every 6 (Six) Hours As Needed for Moderate Pain (POST OP PAIN MED).)  Dispense: 28 tablet; Refill: 0        Plan:     Patient wants a narrower implant. I will order a x high profile sientra [id number] XP x2  Add alloderm 16x 20cm due to the smaller pocket we will generate.   I will change her consent the day of the surgery.     • Given these options, the patient has verbally expressed an understanding of the risks of surgery and finds these risks acceptable. We will proceed with surgery as soon as possible.    • Medications sent to pharmacy      Scribed by Aileen Owens, acting as a scribe for Neena Pires MD, 11/17/22 14:52 EST.  Neena Pires MD's signature on the note affirms that the note adequately documents the care provided.        Follow Up     No follow-ups on file.    Patient was given instructions and counseling regarding her condition. Please see specific information pulled into the AVS if appropriate.     Neena Pires MD  11/17/2022

## 2022-11-17 ENCOUNTER — OFFICE VISIT (OUTPATIENT)
Dept: PLASTIC SURGERY | Facility: CLINIC | Age: 62
End: 2022-11-17

## 2022-11-17 VITALS
HEIGHT: 63 IN | RESPIRATION RATE: 14 BRPM | BODY MASS INDEX: 36.18 KG/M2 | OXYGEN SATURATION: 96 % | TEMPERATURE: 98.9 F | HEART RATE: 64 BPM | SYSTOLIC BLOOD PRESSURE: 157 MMHG | WEIGHT: 204.2 LBS | DIASTOLIC BLOOD PRESSURE: 77 MMHG

## 2022-11-17 DIAGNOSIS — N64.89 BREAST ASYMMETRY: ICD-10-CM

## 2022-11-17 DIAGNOSIS — N65.0 CONTOUR IRREGULARITY IN RECONSTRUCTED BREAST: Primary | ICD-10-CM

## 2022-11-17 PROCEDURE — 99213 OFFICE O/P EST LOW 20 MIN: CPT | Performed by: SURGERY

## 2022-11-17 RX ORDER — HYDROXYZINE HYDROCHLORIDE 25 MG/1
25 TABLET, FILM COATED ORAL NIGHTLY PRN
COMMUNITY
Start: 2022-09-08

## 2022-11-21 RX ORDER — OXYCODONE HYDROCHLORIDE AND ACETAMINOPHEN 5; 325 MG/1; MG/1
1-2 TABLET ORAL EVERY 6 HOURS PRN
Qty: 28 TABLET | Refills: 0 | Status: SHIPPED | OUTPATIENT
Start: 2022-11-21 | End: 2023-01-25

## 2022-11-21 RX ORDER — PROMETHAZINE HYDROCHLORIDE 12.5 MG/1
25 TABLET ORAL EVERY 6 HOURS PRN
Qty: 20 TABLET | Refills: 0 | Status: SHIPPED | OUTPATIENT
Start: 2022-11-21 | End: 2022-12-14 | Stop reason: SDUPTHER

## 2022-11-21 RX ORDER — CLINDAMYCIN HYDROCHLORIDE 150 MG/1
150 CAPSULE ORAL 3 TIMES DAILY
Qty: 30 CAPSULE | Refills: 0 | Status: SHIPPED | OUTPATIENT
Start: 2022-11-21 | End: 2022-12-29

## 2022-11-28 ENCOUNTER — PRE-ADMISSION TESTING (OUTPATIENT)
Dept: PREADMISSION TESTING | Facility: HOSPITAL | Age: 62
End: 2022-11-28

## 2022-11-28 ENCOUNTER — PREP FOR SURGERY (OUTPATIENT)
Dept: OTHER | Facility: HOSPITAL | Age: 62
End: 2022-11-28

## 2022-11-28 VITALS
TEMPERATURE: 99.3 F | DIASTOLIC BLOOD PRESSURE: 82 MMHG | HEIGHT: 63 IN | HEART RATE: 68 BPM | OXYGEN SATURATION: 98 % | WEIGHT: 205.03 LBS | SYSTOLIC BLOOD PRESSURE: 142 MMHG | RESPIRATION RATE: 16 BRPM | BODY MASS INDEX: 36.33 KG/M2

## 2022-11-28 DIAGNOSIS — N65.0 CONTOUR IRREGULARITY IN RECONSTRUCTED BREAST: ICD-10-CM

## 2022-11-28 LAB
ANION GAP SERPL CALCULATED.3IONS-SCNC: 8.4 MMOL/L (ref 5–15)
BUN SERPL-MCNC: 15 MG/DL (ref 8–23)
BUN/CREAT SERPL: 17.6 (ref 7–25)
CALCIUM SPEC-SCNC: 9.5 MG/DL (ref 8.6–10.5)
CHLORIDE SERPL-SCNC: 100 MMOL/L (ref 98–107)
CO2 SERPL-SCNC: 30.6 MMOL/L (ref 22–29)
COTININE UR-MCNC: NEGATIVE NG/ML
CREAT SERPL-MCNC: 0.85 MG/DL (ref 0.57–1)
EGFRCR SERPLBLD CKD-EPI 2021: 77.1 ML/MIN/1.73
GLUCOSE SERPL-MCNC: 96 MG/DL (ref 65–99)
POTASSIUM SERPL-SCNC: 4.1 MMOL/L (ref 3.5–5.2)
SODIUM SERPL-SCNC: 139 MMOL/L (ref 136–145)

## 2022-11-28 PROCEDURE — G0480 DRUG TEST DEF 1-7 CLASSES: HCPCS

## 2022-11-28 PROCEDURE — 93010 ELECTROCARDIOGRAM REPORT: CPT | Performed by: INTERNAL MEDICINE

## 2022-11-28 PROCEDURE — 36415 COLL VENOUS BLD VENIPUNCTURE: CPT

## 2022-11-28 PROCEDURE — 80048 BASIC METABOLIC PNL TOTAL CA: CPT

## 2022-11-28 PROCEDURE — 93005 ELECTROCARDIOGRAM TRACING: CPT

## 2022-11-28 NOTE — DISCHARGE INSTRUCTIONS
IMPORTANT INSTRUCTIONS - PRE-ADMISSION TESTING  DO NOT EAT OR CHEW anything after midnight the night before your procedure.    You may have CLEAR liquids up to ____2__ hours prior to ARRIVAL time.   Take the following medications the morning of your procedure with JUST A SIP OF WATER:  _NONE INDICATED______________________________________________________________________________________________________________________________________________________________________________________    DO NOT BRING your medications to the hospital with you, UNLESS something has changed since your PRE-Admission Testing appointment.  Hold all vitamins, supplements, and NSAIDS (Non- steroidal anti-inflammatory meds) for one week prior to surgery (you MAY take Tylenol or Acetaminophen).HOLD NSAIDS NOW  If you are diabetic, check your blood sugar the morning of your procedure. If it is less than 70 or if you are feeling symptomatic, call the following number for further instructions: [phone] OUT PT SURGERY WILL CALL ARRIVAL TIME BY 3 P.M. 12/5/22_______.  Use your inhalers/nebulizers as usual, the morning of your procedure. BRING YOUR INHALERS with you. NA  Bring your CPAP or BIPAP to hospital, ONLY IF YOU WILL BE SPENDING THE NIGHT. NA  Make sure you have a ride home and have someone who will stay with you the day of your procedure after you go home.  If you have any questions, please call your Pre-Admission Testing Nurse, _________JOE_______ at [phone]__.   Per anesthesia request, do not smoke for 24 hours before your procedure or as instructed by your surgeon.   NA  SHOWER AS DIRECTED WITH ANTIBACTERIAL SOAP PER INSTRUCTION OF DR. RABAGO  DONT REMOVE HAIR FROM OPERATIVE AREA FROM NOW UNTIL AFTER SURGERY  DONT USE ANYTHING ON SKIN AFTER SHOWER: NO LOTION, DEODERANT,  MAKEUP, COLOGNE, JEWELRY OR NAILPOLISH

## 2022-11-29 ENCOUNTER — ANESTHESIA EVENT (OUTPATIENT)
Dept: PERIOP | Facility: HOSPITAL | Age: 62
End: 2022-11-29

## 2022-11-29 RX ORDER — ANASTROZOLE 1 MG/1
TABLET ORAL
Qty: 90 TABLET | Refills: 1 | Status: SHIPPED | OUTPATIENT
Start: 2022-11-29

## 2022-12-01 LAB — QT INTERVAL: 448 MS

## 2022-12-05 NOTE — PROGRESS NOTES
"Chief Complaint  Post-op Follow-up (1 week post op- Breast recon revision; right breast implant capsulectomy, left breast skin tailoring and possible implant change 12/6/22)    Subjective          History of Present Illness  Margy Jimenez is a 62 y.o. female who presents to [facility] PLASTIC [facility] RECONSTRUCTIVE SURGERY for Postoperative Follow-Up of BILATERAL BREAST RECONSTRUCTION REVISION, RIGHT BREAST IMPLANT CAPSULOTOMY, LEFT BREAST SKIN TAILORING AND POSSIBLE IMPLANT EXCHANGE 12/6/22.    She is 1w post op. Is doing well. Very nauseous and is wanting a refill on promethazine. Pain level 2/10. Is wearing compression bra.    Allergies: Hydromorphone, Hydromorphone hcl, and Cephalexin  Allergies Reconciled.    Review of Systems   All review of system has been reviewed and it  is negative except the ones note above.     Objective     /80 (BP Location: Left arm, Patient Position: Sitting, Cuff Size: Adult)   Pulse 60   Temp 98.2 °F (36.8 °C) (Temporal)   Ht 160 cm (62.99\")   Wt 93.9 kg (207 lb)   SpO2 99%   BMI 36.68 kg/m²     Body mass index is 36.68 kg/m².    Physical Exam   Breast: Implants in good position, expected swelling and ecchymosis, no erythema,     Result Review :                Assessment and Plan      Diagnoses and all orders for this visit:    1. Contour irregularity in reconstructed breast  -     promethazine (PHENERGAN) 12.5 MG tablet; Take 2 tablets by mouth Every 6 (Six) Hours As Needed for Nausea or Vomiting.  Dispense: 20 tablet; Refill: 0    2. Breast asymmetry  -     promethazine (PHENERGAN) 12.5 MG tablet; Take 2 tablets by mouth Every 6 (Six) Hours As Needed for Nausea or Vomiting.  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 RTO in 3wks for 1m post op     Scribed by Aileen Owens, acting as a scribe for Neena Pires MD, " 12/14/22 11:17 EST.  Neena Pires MD's signature on the note affirms that the note adequately documents the care provided.          Follow Up     Return for 3wks for 1m post op .    Patient was given instructions and counseling regarding her condition. Please see specific information pulled into the AVS if appropriate.     Neena Pires MD  12/14/2022

## 2022-12-06 ENCOUNTER — ANESTHESIA (OUTPATIENT)
Dept: PERIOP | Facility: HOSPITAL | Age: 62
End: 2022-12-06

## 2022-12-06 ENCOUNTER — HOSPITAL ENCOUNTER (OUTPATIENT)
Facility: HOSPITAL | Age: 62
Setting detail: HOSPITAL OUTPATIENT SURGERY
Discharge: HOME | End: 2022-12-06
Attending: SURGERY | Admitting: SURGERY

## 2022-12-06 VITALS
BODY MASS INDEX: 35.94 KG/M2 | WEIGHT: 202.82 LBS | RESPIRATION RATE: 20 BRPM | HEART RATE: 64 BPM | OXYGEN SATURATION: 93 % | SYSTOLIC BLOOD PRESSURE: 145 MMHG | TEMPERATURE: 97.8 F | DIASTOLIC BLOOD PRESSURE: 62 MMHG | HEIGHT: 63 IN

## 2022-12-06 DIAGNOSIS — N65.0 CONTOUR IRREGULARITY IN RECONSTRUCTED BREAST: ICD-10-CM

## 2022-12-06 LAB — COTININE UR-MCNC: NEGATIVE NG/ML

## 2022-12-06 PROCEDURE — 25010000002 FENTANYL CITRATE (PF) 50 MCG/ML SOLUTION: Performed by: NURSE ANESTHETIST, CERTIFIED REGISTERED

## 2022-12-06 PROCEDURE — C1789 PROSTHESIS, BREAST, IMP: HCPCS | Performed by: SURGERY

## 2022-12-06 PROCEDURE — 25010000002 ONDANSETRON PER 1 MG: Performed by: NURSE ANESTHETIST, CERTIFIED REGISTERED

## 2022-12-06 PROCEDURE — 19342 INSJ/RPLCMT BRST IMPLT SEP D: CPT | Performed by: SURGERY

## 2022-12-06 PROCEDURE — 25010000002 DEXAMETHASONE PER 1 MG: Performed by: NURSE ANESTHETIST, CERTIFIED REGISTERED

## 2022-12-06 PROCEDURE — 0 MEPERIDINE PER 100 MG: Performed by: NURSE ANESTHETIST, CERTIFIED REGISTERED

## 2022-12-06 PROCEDURE — 25010000002 PROPOFOL 10 MG/ML EMULSION: Performed by: NURSE ANESTHETIST, CERTIFIED REGISTERED

## 2022-12-06 PROCEDURE — G0480 DRUG TEST DEF 1-7 CLASSES: HCPCS | Performed by: NURSE PRACTITIONER

## 2022-12-06 PROCEDURE — 88302 TISSUE EXAM BY PATHOLOGIST: CPT | Performed by: SURGERY

## 2022-12-06 PROCEDURE — 25010000002 MIDAZOLAM PER 1 MG: Performed by: ANESTHESIOLOGY

## 2022-12-06 PROCEDURE — 15777 ACELLULAR DERM MATRIX IMPLT: CPT | Performed by: SURGERY

## 2022-12-06 DEVICE — GRFT TISS ALLODERM RTM PERF 16X20CM 2.4MM/THK .4MM
Type: IMPLANTABLE DEVICE | Site: BREAST | Status: NON-FUNCTIONAL
Removed: 2023-01-04

## 2022-12-06 DEVICE — BRST SIL HSCPLS OPUSLUXE SMOTH RND HI/PROJ 505CC: Type: IMPLANTABLE DEVICE | Site: BREAST | Status: FUNCTIONAL

## 2022-12-06 RX ORDER — OXYCODONE HYDROCHLORIDE 5 MG/1
5 TABLET ORAL
Status: COMPLETED | OUTPATIENT
Start: 2022-12-06 | End: 2022-12-06

## 2022-12-06 RX ORDER — LIDOCAINE HYDROCHLORIDE 20 MG/ML
INJECTION, SOLUTION EPIDURAL; INFILTRATION; INTRACAUDAL; PERINEURAL AS NEEDED
Status: DISCONTINUED | OUTPATIENT
Start: 2022-12-06 | End: 2022-12-06 | Stop reason: SURG

## 2022-12-06 RX ORDER — CELECOXIB 100 MG/1
200 CAPSULE ORAL ONCE
Status: COMPLETED | OUTPATIENT
Start: 2022-12-06 | End: 2022-12-06

## 2022-12-06 RX ORDER — PROMETHAZINE HYDROCHLORIDE 12.5 MG/1
25 TABLET ORAL ONCE AS NEEDED
Status: DISCONTINUED | OUTPATIENT
Start: 2022-12-06 | End: 2022-12-06 | Stop reason: HOSPADM

## 2022-12-06 RX ORDER — FENTANYL CITRATE 50 UG/ML
INJECTION, SOLUTION INTRAMUSCULAR; INTRAVENOUS AS NEEDED
Status: DISCONTINUED | OUTPATIENT
Start: 2022-12-06 | End: 2022-12-06 | Stop reason: SURG

## 2022-12-06 RX ORDER — MEPERIDINE HYDROCHLORIDE 25 MG/ML
12.5 INJECTION INTRAMUSCULAR; INTRAVENOUS; SUBCUTANEOUS
Status: DISCONTINUED | OUTPATIENT
Start: 2022-12-06 | End: 2022-12-06 | Stop reason: HOSPADM

## 2022-12-06 RX ORDER — ONDANSETRON 2 MG/ML
4 INJECTION INTRAMUSCULAR; INTRAVENOUS ONCE AS NEEDED
Status: DISCONTINUED | OUTPATIENT
Start: 2022-12-06 | End: 2022-12-06 | Stop reason: HOSPADM

## 2022-12-06 RX ORDER — ONDANSETRON 2 MG/ML
INJECTION INTRAMUSCULAR; INTRAVENOUS AS NEEDED
Status: DISCONTINUED | OUTPATIENT
Start: 2022-12-06 | End: 2022-12-06 | Stop reason: SURG

## 2022-12-06 RX ORDER — GLYCOPYRROLATE 0.2 MG/ML
0.2 INJECTION INTRAMUSCULAR; INTRAVENOUS
Status: COMPLETED | OUTPATIENT
Start: 2022-12-06 | End: 2022-12-06

## 2022-12-06 RX ORDER — MIDAZOLAM HYDROCHLORIDE 1 MG/ML
2 INJECTION INTRAMUSCULAR; INTRAVENOUS ONCE
Status: COMPLETED | OUTPATIENT
Start: 2022-12-06 | End: 2022-12-06

## 2022-12-06 RX ORDER — CLINDAMYCIN PHOSPHATE 900 MG/50ML
900 INJECTION INTRAVENOUS ONCE
Status: COMPLETED | OUTPATIENT
Start: 2022-12-06 | End: 2022-12-06

## 2022-12-06 RX ORDER — PROMETHAZINE HYDROCHLORIDE 25 MG/1
25 SUPPOSITORY RECTAL ONCE AS NEEDED
Status: DISCONTINUED | OUTPATIENT
Start: 2022-12-06 | End: 2022-12-06 | Stop reason: HOSPADM

## 2022-12-06 RX ORDER — DEXAMETHASONE SODIUM PHOSPHATE 4 MG/ML
INJECTION, SOLUTION INTRA-ARTICULAR; INTRALESIONAL; INTRAMUSCULAR; INTRAVENOUS; SOFT TISSUE AS NEEDED
Status: DISCONTINUED | OUTPATIENT
Start: 2022-12-06 | End: 2022-12-06 | Stop reason: SURG

## 2022-12-06 RX ORDER — DEXMEDETOMIDINE HYDROCHLORIDE 100 UG/ML
INJECTION, SOLUTION INTRAVENOUS AS NEEDED
Status: DISCONTINUED | OUTPATIENT
Start: 2022-12-06 | End: 2022-12-06 | Stop reason: SURG

## 2022-12-06 RX ORDER — ACETAMINOPHEN 500 MG
1000 TABLET ORAL ONCE
Status: COMPLETED | OUTPATIENT
Start: 2022-12-06 | End: 2022-12-06

## 2022-12-06 RX ORDER — PROPOFOL 10 MG/ML
VIAL (ML) INTRAVENOUS AS NEEDED
Status: DISCONTINUED | OUTPATIENT
Start: 2022-12-06 | End: 2022-12-06 | Stop reason: SURG

## 2022-12-06 RX ORDER — FENTANYL CITRATE 50 UG/ML
25 INJECTION, SOLUTION INTRAMUSCULAR; INTRAVENOUS
Status: COMPLETED | OUTPATIENT
Start: 2022-12-06 | End: 2022-12-06

## 2022-12-06 RX ORDER — KETAMINE HCL IN NACL, ISO-OSM 100MG/10ML
SYRINGE (ML) INJECTION AS NEEDED
Status: DISCONTINUED | OUTPATIENT
Start: 2022-12-06 | End: 2022-12-06 | Stop reason: SURG

## 2022-12-06 RX ORDER — SODIUM CHLORIDE, SODIUM LACTATE, POTASSIUM CHLORIDE, CALCIUM CHLORIDE 600; 310; 30; 20 MG/100ML; MG/100ML; MG/100ML; MG/100ML
9 INJECTION, SOLUTION INTRAVENOUS CONTINUOUS PRN
Status: DISCONTINUED | OUTPATIENT
Start: 2022-12-06 | End: 2022-12-06 | Stop reason: HOSPADM

## 2022-12-06 RX ORDER — PHENYLEPHRINE HCL IN 0.9% NACL 1 MG/10 ML
SYRINGE (ML) INTRAVENOUS AS NEEDED
Status: DISCONTINUED | OUTPATIENT
Start: 2022-12-06 | End: 2022-12-06 | Stop reason: SURG

## 2022-12-06 RX ORDER — ROCURONIUM BROMIDE 10 MG/ML
INJECTION, SOLUTION INTRAVENOUS AS NEEDED
Status: DISCONTINUED | OUTPATIENT
Start: 2022-12-06 | End: 2022-12-06 | Stop reason: SURG

## 2022-12-06 RX ADMIN — FENTANYL CITRATE 25 MCG: 50 INJECTION, SOLUTION INTRAMUSCULAR; INTRAVENOUS at 09:56

## 2022-12-06 RX ADMIN — FENTANYL CITRATE 50 MCG: 50 INJECTION, SOLUTION INTRAMUSCULAR; INTRAVENOUS at 07:16

## 2022-12-06 RX ADMIN — FENTANYL CITRATE 25 MCG: 50 INJECTION, SOLUTION INTRAMUSCULAR; INTRAVENOUS at 08:08

## 2022-12-06 RX ADMIN — OXYCODONE HYDROCHLORIDE 5 MG: 5 TABLET ORAL at 09:55

## 2022-12-06 RX ADMIN — Medication 15 MG: at 08:29

## 2022-12-06 RX ADMIN — ACETAMINOPHEN 1000 MG: 500 TABLET ORAL at 06:42

## 2022-12-06 RX ADMIN — Medication 100 MCG: at 07:32

## 2022-12-06 RX ADMIN — PROPOFOL 150 MG: 10 INJECTION, EMULSION INTRAVENOUS at 07:16

## 2022-12-06 RX ADMIN — ONDANSETRON 4 MG: 2 INJECTION INTRAMUSCULAR; INTRAVENOUS at 09:04

## 2022-12-06 RX ADMIN — Medication 10 MG: at 08:11

## 2022-12-06 RX ADMIN — OXYCODONE HYDROCHLORIDE 5 MG: 5 TABLET ORAL at 09:32

## 2022-12-06 RX ADMIN — SODIUM CHLORIDE, POTASSIUM CHLORIDE, SODIUM LACTATE AND CALCIUM CHLORIDE 9 ML/HR: 600; 310; 30; 20 INJECTION, SOLUTION INTRAVENOUS at 06:42

## 2022-12-06 RX ADMIN — ROCURONIUM BROMIDE 40 MG: 10 INJECTION INTRAVENOUS at 07:16

## 2022-12-06 RX ADMIN — FENTANYL CITRATE 12.5 MCG: 50 INJECTION, SOLUTION INTRAMUSCULAR; INTRAVENOUS at 08:57

## 2022-12-06 RX ADMIN — DEXMEDETOMIDINE HYDROCHLORIDE 10 MCG: 100 INJECTION, SOLUTION, CONCENTRATE INTRAVENOUS at 07:55

## 2022-12-06 RX ADMIN — GLYCOPYRROLATE 0.2 MG: 0.2 INJECTION INTRAMUSCULAR; INTRAVENOUS at 06:43

## 2022-12-06 RX ADMIN — MEPERIDINE HYDROCHLORIDE 12.5 MG: 25 INJECTION INTRAMUSCULAR; INTRAVENOUS; SUBCUTANEOUS at 09:37

## 2022-12-06 RX ADMIN — PROPOFOL 200 MCG/KG/MIN: 10 INJECTION, EMULSION INTRAVENOUS at 07:20

## 2022-12-06 RX ADMIN — DEXMEDETOMIDINE HYDROCHLORIDE 10 MCG: 100 INJECTION, SOLUTION, CONCENTRATE INTRAVENOUS at 07:34

## 2022-12-06 RX ADMIN — CELECOXIB 200 MG: 100 CAPSULE ORAL at 06:42

## 2022-12-06 RX ADMIN — ROCURONIUM BROMIDE 25 MG: 10 INJECTION INTRAVENOUS at 08:06

## 2022-12-06 RX ADMIN — LIDOCAINE HYDROCHLORIDE 50 MG: 20 INJECTION, SOLUTION EPIDURAL; INFILTRATION; INTRACAUDAL; PERINEURAL at 07:16

## 2022-12-06 RX ADMIN — CLINDAMYCIN IN 5 PERCENT DEXTROSE 900 MG: 18 INJECTION, SOLUTION INTRAVENOUS at 07:16

## 2022-12-06 RX ADMIN — Medication 25 MG: at 07:36

## 2022-12-06 RX ADMIN — ROCURONIUM BROMIDE 15 MG: 10 INJECTION INTRAVENOUS at 08:21

## 2022-12-06 RX ADMIN — FENTANYL CITRATE 25 MCG: 50 INJECTION, SOLUTION INTRAMUSCULAR; INTRAVENOUS at 07:37

## 2022-12-06 RX ADMIN — ROCURONIUM BROMIDE 20 MG: 10 INJECTION INTRAVENOUS at 08:52

## 2022-12-06 RX ADMIN — DEXAMETHASONE SODIUM PHOSPHATE 8 MG: 4 INJECTION, SOLUTION INTRA-ARTICULAR; INTRALESIONAL; INTRAMUSCULAR; INTRAVENOUS; SOFT TISSUE at 07:28

## 2022-12-06 RX ADMIN — DEXMEDETOMIDINE HYDROCHLORIDE 10 MCG: 100 INJECTION, SOLUTION, CONCENTRATE INTRAVENOUS at 08:52

## 2022-12-06 RX ADMIN — FENTANYL CITRATE 25 MCG: 50 INJECTION, SOLUTION INTRAMUSCULAR; INTRAVENOUS at 09:36

## 2022-12-06 RX ADMIN — LIDOCAINE HYDROCHLORIDE 50 MG: 20 INJECTION, SOLUTION EPIDURAL; INFILTRATION; INTRACAUDAL; PERINEURAL at 09:05

## 2022-12-06 RX ADMIN — ROCURONIUM BROMIDE 20 MG: 10 INJECTION INTRAVENOUS at 07:35

## 2022-12-06 RX ADMIN — DEXMEDETOMIDINE HYDROCHLORIDE 5 MCG: 100 INJECTION, SOLUTION, CONCENTRATE INTRAVENOUS at 08:10

## 2022-12-06 RX ADMIN — SUGAMMADEX 200 MG: 100 INJECTION, SOLUTION INTRAVENOUS at 09:05

## 2022-12-06 RX ADMIN — MIDAZOLAM HYDROCHLORIDE 2 MG: 1 INJECTION, SOLUTION INTRAMUSCULAR; INTRAVENOUS at 06:43

## 2022-12-06 RX ADMIN — FENTANYL CITRATE 12.5 MCG: 50 INJECTION, SOLUTION INTRAMUSCULAR; INTRAVENOUS at 09:00

## 2022-12-06 NOTE — ANESTHESIA POSTPROCEDURE EVALUATION
Patient: Margy Jimenez    Procedure Summary     Date: 12/06/22 Room / Location: [facility] OR [address] [place] [address] OR [facility]    Anesthesia Start: 0711 Anesthesia Stop: 0929    Procedure: BREAST RECONSTRUCTION REVISION (Bilateral: Breast) Diagnosis:       Contour irregularity in reconstructed breast      (Contour irregularity in reconstructed breast [N65.0])    Surgeons: Neena Pires MD Provider: Elie Gerber MD    Anesthesia Type: general ASA Status: 2          Anesthesia Type: general    Vitals  Vitals Value Taken Time   /86 12/06/22 1000   Temp 35.9 °C (96.6 °F) 12/06/22 0924   Pulse 60 12/06/22 1000   Resp 12 12/06/22 0924   SpO2 95 % 12/06/22 1000           Post Anesthesia Care and Evaluation    Patient location during evaluation: bedside  Patient participation: complete - patient participated  Level of consciousness: awake  Pain management: adequate    Airway patency: patent  Anesthetic complications: No anesthetic complications  PONV Status: none  Cardiovascular status: acceptable and stable  Respiratory status: acceptable  Hydration status: acceptable    Comments: An Anesthesiologist personally participated in the most demanding procedures (including induction and emergence if applicable) in the anesthesia plan, monitored the course of anesthesia administration at frequent intervals and remained physically present and available for immediate diagnosis and treatment of emergencies.

## 2022-12-06 NOTE — INTERVAL H&P NOTE
H&P reviewed. The patient was examined and there are no changes to the H&P.    Patient is not tachycardic  Respirations are non elaborated  Vitals:    12/06/22 0615   BP: 116/81   Pulse: 75   Resp: 16   Temp: 98.1 °F (36.7 °C)   SpO2: 95%     Risks and benefits reminded to patient who agrees to proceed

## 2022-12-06 NOTE — DISCHARGE INSTRUCTIONS
Ok for regular diet  Do not drive for next 2 weeks  Ok to shower  tomorrow.   Do not exercise for the next 6 weeks.  Sleep in an upright position  Ok to wear a comfortable soft bra  Ok to move arms slowly to the shoulder level (brushing hair movement)  Do not fly for 2 months       DISCHARGE INSTRUCTIONS  SURGICAL / AMBULATORY  PROCEDURES      For your surgery you had:  General anesthesia (you may have a sore throat for the first 24 hours)  IV sedation.  Local anesthesia  Monitored anesthesia Care  You received a medicated patch for nausea prevention today (behind your ear). It is recommended that you remove it 24-48 hours post-operatively. It must be removed within 72 hours.   You have received an anesthesia medication today that can cause hormonal forms of birth control to be ineffective. You should use a different form of birth control (to prevent pregnancy) for 7 days.   You may experience dizziness, drowsiness, or light-headedness for several hours following surgery/procedure.  Do not stay alone today or tonight.  Limit your activity for 24 hours.  Resume your diet slowly.  Follow whatever special dietary instructions you may have been given by your doctor.  You should not drive or operate machinery, drink alcohol, or sign legally binding documents for 24 hours or while you are taking pain medication.  Last dose of pain medication was given at:   .  NOTIFY YOUR DOCTOR IF YOU EXPERIENCE ANY OF THE FOLLOWING:  Temperature greater than 101 degrees Fahrenheit  Shaking Chills  Redness or excessive drainage from incision  Nausea, vomiting and/or pain that is not controlled by prescribed medications  Increase in bleeding or bleeding that is excessive  Unable to urinate in 6 hours after surgery  If unable to reach your doctor, please go to the closest Emergency Room    You may shower tomorrow.  Medications per physician instructions as indicated on Discharge Medication Information Sheet.  You should see   for  follow-up care   on   .  Phone number:       SPECIAL INSTRUCTIONS:

## 2022-12-06 NOTE — ANESTHESIA PREPROCEDURE EVALUATION
Anesthesia Evaluation     Patient summary reviewed and Nursing notes reviewed   no history of anesthetic complications:  NPO Solid Status: > 8 hours  NPO Liquid Status: > 2 hours           Airway   Mallampati: II  TM distance: >3 FB  Neck ROM: full  No difficulty expected  Dental      Pulmonary - negative pulmonary ROS and normal exam    breath sounds clear to auscultation  Cardiovascular - normal exam  Exercise tolerance: good (4-7 METS)    Rhythm: regular  Rate: normal    (+) hypertension well controlled, hyperlipidemia,       Neuro/Psych  (+) headaches, psychiatric history Depression,    GI/Hepatic/Renal/Endo    (+)  GERD,      Musculoskeletal     (+) joint swelling,   Abdominal    Substance History - negative use     OB/GYN negative ob/gyn ROS         Other   arthritis,    history of cancer remission    ROS/Med Hx Other: >4METS, HX HTN. FOLLOWS PCP. REVIEW EKG, LVH, PER DR. REYES REVIEW: NSR.  KT                   Anesthesia Plan    ASA 2     general     (Patient understands anesthesia not responsible for dental damage.)  intravenous induction     Anesthetic plan, risks, benefits, and alternatives have been provided, discussed and informed consent has been obtained with: patient.  Pre-procedure education provided  Plan discussed with CRNA.        CODE STATUS:

## 2022-12-06 NOTE — OP NOTE
Plastic Surgery Op Note    BILATERAL BREAST RECONSTRUCTION REVISION WITH IMPLANT REPLACEMENT, MESH PLACEMENT AND SKIN TAILORING    Margy Austinough  12/6/2022    Pre-op Diagnosis:   Contour irregularity in reconstructed breast [N65.0]    Post-Op Diagnosis Codes:     * Contour irregularity in reconstructed breast [N65.0]    Anesthesia: General    Staff:   Circulator: Mariangel Benavidez RN  Scrub Person: Tasneem Kuo  Assistant: Diane Delacruz  Other: Thelma Mercado RN    Surgeon: Dr Pires    Estimated Blood Loss: minimal    Specimens:   Order Name Source Comment Collection Info Order Time   NICOTINE SCREEN, URINE Urine, Clean Catch Pre op morning of surgery Collected By: Mary Christiansen 12/6/2022  6:10 AM     Release to patient   Routine Release        TISSUE PATHOLOGY EXAM Breast, Right  Collected By: Neena Pires MD 12/6/2022  8:22 AM     Release to patient   Routine Release              Drains: * No LDAs found *    Findings:     No abnormal findings in the pocket.        Implants:     Explanted:  Right:   Implant: sientra high projection 700 cc  Left:  Implant: sientra high projection 700 cc    Implanted:  Right:   Sientra [id number]  Alloderm select perforated rectangular 16x20 cm     Left:  Sientra [id number] HP  Alloderm select perforated rectangular 16x20 cm      Skin resection:  R: 20x 4 cm  L: 22x 5 cm      Complications: None      Date: 12/6/2022  Time: 09:16 EST  After risks and benefits were discussed with patient and informed consent was signed, patient was taken to the OR and positioned supine. The surgical site was then prepped in a sterile fashion way and a time out was performed confirming patient's name and procedure to be performed. All surgical team was introduced by name.     Both breasts were done step by step and I started on the right.   I incised the right breast and removed the implant. Then, I fixed the mesh on the muscle with running 2-0 PDS. Then the cavity  was irrigated with saline followed by irrisept. Then the implants were placed and the mesh sutured to the lateral chest wall bilaterally. I then resected the excess of skin from both inferior border of the breasts. The incisions were closed with two layers of 3-0 vicryl followed by a surgical glue system.   Patient tolerated procedure well, there were no complications, all instruments were checked and patient was awakened and taken to PACU in stable condition.  I was present for the entire procedure.            Neena Pires MD  12/06/22  09:16 EST

## 2022-12-14 ENCOUNTER — OFFICE VISIT (OUTPATIENT)
Dept: PLASTIC SURGERY | Facility: CLINIC | Age: 62
End: 2022-12-14

## 2022-12-14 VITALS
TEMPERATURE: 98.2 F | SYSTOLIC BLOOD PRESSURE: 123 MMHG | DIASTOLIC BLOOD PRESSURE: 80 MMHG | WEIGHT: 207 LBS | HEIGHT: 63 IN | BODY MASS INDEX: 36.68 KG/M2 | HEART RATE: 60 BPM | OXYGEN SATURATION: 99 %

## 2022-12-14 DIAGNOSIS — N65.0 CONTOUR IRREGULARITY IN RECONSTRUCTED BREAST: ICD-10-CM

## 2022-12-14 DIAGNOSIS — N64.89 BREAST ASYMMETRY: ICD-10-CM

## 2022-12-14 PROCEDURE — 99024 POSTOP FOLLOW-UP VISIT: CPT | Performed by: SURGERY

## 2022-12-14 RX ORDER — TAMSULOSIN HYDROCHLORIDE 0.4 MG/1
1 CAPSULE ORAL NIGHTLY
COMMUNITY
Start: 2022-12-01

## 2022-12-14 RX ORDER — PROMETHAZINE HYDROCHLORIDE 12.5 MG/1
25 TABLET ORAL EVERY 6 HOURS PRN
Qty: 20 TABLET | Refills: 0 | Status: SHIPPED | OUTPATIENT
Start: 2022-12-14

## 2022-12-22 DIAGNOSIS — N61.0 CELLULITIS OF BREAST: Primary | ICD-10-CM

## 2022-12-22 RX ORDER — SULFAMETHOXAZOLE AND TRIMETHOPRIM 800; 160 MG/1; MG/1
1 TABLET ORAL 2 TIMES DAILY
Qty: 20 TABLET | Refills: 0 | Status: SHIPPED | OUTPATIENT
Start: 2022-12-22 | End: 2023-01-11

## 2022-12-22 NOTE — PROGRESS NOTES
"Chief Complaint  No chief complaint on file.    Subjective        Margy Jimenez presents to [facility] PLASTIC & RECONSTRUCTIVE SURGERY  History of Present Illness  Patient submitted pictures through the electronic medical record with new onset erythema and swelling to the right breast.      Objective   Vital Signs:  There were no vitals taken for this visit.  Estimated body mass index is 36.68 kg/m² as calculated from the following:    Height as of 12/14/22: 160 cm (62.99\").    Weight as of 12/14/22: 93.9 kg (207 lb).          Physical Exam   Result Review :           The pictures were reviewed.  She had indeed erythema and swelling to the right breast consistent with early cellulitis.     Assessment and Plan   Diagnoses and all orders for this visit:    1. Cellulitis of breast (Primary)  -     sulfamethoxazole-trimethoprim (Bactrim DS) 800-160 MG per tablet; Take 1 tablet by mouth 2 (Two) Times a Day.  Dispense: 20 tablet; Refill: 0    Based on the pictures, antibiotics were submitted         Follow Up   No follow-ups on file.  Patient was given instructions and counseling regarding her condition or for health maintenance advice. Please see specific information pulled into the AVS if appropriate.       "

## 2022-12-28 ENCOUNTER — TELEPHONE (OUTPATIENT)
Dept: PLASTIC SURGERY | Facility: CLINIC | Age: 62
End: 2022-12-28

## 2022-12-28 NOTE — TELEPHONE ENCOUNTER
Rubi Solano from lymphedema calls on this mutual patient stating this patient has been trying to reach out to us to get an appt but couldn't get ahold of anyone. Rubi states she has been having lots of drainage thinking she has an infection and we put her on an antibiotics. Rubi stated that she was wanting a call back to get an appointment.     I will reach out to this patient and get her an appointment.

## 2022-12-28 NOTE — PROGRESS NOTES
"Chief Complaint  Post-op Follow-up (Right breast drainage)    Subjective          History of Present Illness  Margy Jimenez is a 62 y.o. female who presents to [facility] PLASTIC & RECONSTRUCTIVE SURGERY for Postoperative Follow-Up of BILATERAL BREAST RECONSTRUCTION REVISION, RIGHT BREAST IMPLANT CAPSULOTOMY, LEFT BREAST SKIN TAILORING AND POSSIBLE IMPLANT EXCHANGE 12/6/22.    She is 3wks post op and here for right breast drainage. Yellowish drainage. No odor. She is on antibiotics but states she is having to use sanitary pads due to so much drainage. Started on last Friday. No fever. Some redness.   Patient continues to have significant cloudy drainage from the right breast.  Denies fevers or chills.  However she states that the drainage came intermittently substantial.  It was originally draining out of a defect on the lateral aspect of the breast but now there is a separation of the incision on the medial aspect of the right breast through which this drainage occurs.    Allergies: Hydromorphone, Hydromorphone hcl, and Cephalexin  Allergies Reconciled.    Review of Systems   All review of system has been reviewed and it  is negative except the ones note above.     Objective     /80 (BP Location: Left arm, Patient Position: Sitting, Cuff Size: Adult)   Pulse 75   Temp 98 °F (36.7 °C) (Temporal)   Ht 160 cm (62.99\")   Wt 93.4 kg (206 lb)   SpO2 100%   BMI 36.50 kg/m²     Body mass index is 36.5 kg/m².    Physical Exam   Cardiovascular: Normal rate.     Pulmonary/Chest  Effort normal.      Breast: Left breast is clean and intact, no erythema or induration.  Nontender.  Right breast is erythematous, especially laterally and there is a 1 cm dehiscence with thinning skin along the medial aspect of the mastectomy incision with the return of a thin turbid fluid.  The breast is mildly tender.  Result Review :        Assessment and Plan      Diagnoses and all orders for this " visit:    1. Infected breast tissue expander, subsequent encounter (Primary)        Plan: Removal of right breast implant    CPT code 81779    The procedure was discussed with the patient.  The risks and benefits were described.  Long discussion with patient regarding management of infected implants.  The patient has had several operations for these implants and now has an infected right breast implant with drainage and wound edge separation in spite of antibiotic therapy.  The plan is to remove the implant and any associated mesh, washout the pocket, closed over a drain and then once she has healed (in 4 to 6 months) place a tissue expander.  She will then undergo tissue expansion until we can recreate the original pocket and volume and then she will undergo an implant exchange.  The risk described included, but not limited to, bleeding, infection, need for revision surgeries, chronic and acute pain, poor cosmetic result, poor functional result, altered loss sensation, seroma, hematoma, inability to complete implant-based reconstruction.  Her questions were answered.  She agrees to the procedure.        Follow Up     Return in about 2 weeks (around 1/12/2023), or For postop.    Patient was given instructions and counseling regarding her condition. Please see specific information pulled into the AVS if appropriate.     Jordan Pandey MD  12/29/2022

## 2022-12-28 NOTE — H&P (VIEW-ONLY)
Chief Complaint  Post-op Follow-up (Right breast drainage)    Subjective          History of Present Illness  Margy Jimenez is a 62 y.o. female who presents to [facility] PLASTIC & RECONSTRUCTIVE SURGERY for Postoperative Follow-Up of BILATERAL BREAST RECONSTRUCTION REVISION, RIGHT BREAST IMPLANT CAPSULOTOMY, LEFT BREAST SKIN TAILORING AND POSSIBLE IMPLANT EXCHANGE 12/6/22.    She is 3wks post op and here for right breast drainage. Yellowish drainage. No odor. She is on antibiotics but states she is having to use sanitary pads due to so much drainage. Started on last Friday. No fever. Some redness.   Patient continues to have significant cloudy drainage from the right breast.  Denies fevers or chills.  However she states that the drainage came intermittently substantial.  It was originally draining out of a defect on the lateral aspect of the breast but now there is a separation of the incision on the medial aspect of the right breast through which this drainage occurs.    Allergies: Hydromorphone, Hydromorphone hcl, and Cephalexin  Allergies Reconciled.    Review of Systems   All review of system has been reviewed and it  is negative except the ones note above.     Objective     /80 (BP Location: Left arm, Patient Position: Sitting, Cuff Size: Adult)   Pulse 75   Temp 98 °F (36.7 °C) (Temporal)   Ht 160 cm (62.99\")   Wt 93.4 kg (206 lb)   SpO2 100%   BMI 36.50 kg/m²     Body mass index is 36.5 kg/m².    Physical Exam   Cardiovascular: Normal rate.     Pulmonary/Chest  Effort normal.      Breast: Left breast is clean and intact, no erythema or induration.  Nontender.  Right breast is erythematous, especially laterally and there is a 1 cm dehiscence with thinning skin along the medial aspect of the mastectomy incision with the return of a thin turbid fluid.  The breast is mildly tender.  Result Review :        Assessment and Plan      Diagnoses and all orders for this  visit:    1. Infected breast tissue expander, subsequent encounter (Primary)        Plan: Removal of right breast implant    CPT code 33614    The procedure was discussed with the patient.  The risks and benefits were described.  Long discussion with patient regarding management of infected implants.  The patient has had several operations for these implants and now has an infected right breast implant with drainage and wound edge separation in spite of antibiotic therapy.  The plan is to remove the implant and any associated mesh, washout the pocket, closed over a drain and then once she has healed (in 4 to 6 months) place a tissue expander.  She will then undergo tissue expansion until we can recreate the original pocket and volume and then she will undergo an implant exchange.  The risk described included, but not limited to, bleeding, infection, need for revision surgeries, chronic and acute pain, poor cosmetic result, poor functional result, altered loss sensation, seroma, hematoma, inability to complete implant-based reconstruction.  Her questions were answered.  She agrees to the procedure.        Follow Up     Return in about 2 weeks (around 1/12/2023), or For postop.    Patient was given instructions and counseling regarding her condition. Please see specific information pulled into the AVS if appropriate.     Jordan Pandey MD  12/29/2022

## 2022-12-29 ENCOUNTER — OFFICE VISIT (OUTPATIENT)
Dept: PLASTIC SURGERY | Facility: CLINIC | Age: 62
End: 2022-12-29

## 2022-12-29 ENCOUNTER — PREP FOR SURGERY (OUTPATIENT)
Dept: OTHER | Facility: HOSPITAL | Age: 62
End: 2022-12-29

## 2022-12-29 VITALS
HEART RATE: 75 BPM | SYSTOLIC BLOOD PRESSURE: 126 MMHG | BODY MASS INDEX: 36.5 KG/M2 | DIASTOLIC BLOOD PRESSURE: 80 MMHG | WEIGHT: 206 LBS | HEIGHT: 63 IN | TEMPERATURE: 98 F | OXYGEN SATURATION: 100 %

## 2022-12-29 DIAGNOSIS — T85.79XD: Primary | ICD-10-CM

## 2022-12-29 PROCEDURE — 99024 POSTOP FOLLOW-UP VISIT: CPT | Performed by: SURGERY

## 2022-12-29 RX ORDER — CLINDAMYCIN PHOSPHATE 900 MG/50ML
900 INJECTION INTRAVENOUS ONCE
Status: CANCELLED | OUTPATIENT
Start: 2022-12-29 | End: 2022-12-29

## 2022-12-30 ENCOUNTER — TELEPHONE (OUTPATIENT)
Dept: PLASTIC SURGERY | Facility: CLINIC | Age: 62
End: 2022-12-30

## 2022-12-30 DIAGNOSIS — Z01.818 PRE-OPERATIVE EXAMINATION: Primary | ICD-10-CM

## 2022-12-30 RX ORDER — CLINDAMYCIN HYDROCHLORIDE 300 MG/1
300 CAPSULE ORAL 3 TIMES DAILY
Qty: 30 CAPSULE | Refills: 0 | Status: SHIPPED | OUTPATIENT
Start: 2022-12-30 | End: 2023-01-09

## 2022-12-30 RX ORDER — OXYCODONE HYDROCHLORIDE AND ACETAMINOPHEN 5; 325 MG/1; MG/1
1 TABLET ORAL EVERY 6 HOURS PRN
Qty: 28 TABLET | Refills: 0 | Status: SHIPPED | OUTPATIENT
Start: 2022-12-30 | End: 2023-01-11 | Stop reason: SDUPTHER

## 2022-12-30 RX ORDER — MONTELUKAST SODIUM 10 MG/1
10 TABLET ORAL DAILY
Qty: 42 TABLET | Refills: 0 | Status: SHIPPED | OUTPATIENT
Start: 2022-12-30

## 2022-12-30 RX ORDER — PROMETHAZINE HYDROCHLORIDE 25 MG/1
25 TABLET ORAL EVERY 6 HOURS PRN
Qty: 10 TABLET | Refills: 0 | Status: SHIPPED | OUTPATIENT
Start: 2022-12-30 | End: 2023-01-11 | Stop reason: SDUPTHER

## 2022-12-30 NOTE — TELEPHONE ENCOUNTER
Patient calls and states she was seen yesterday in clinic with Dr. Quevedo for a pre op and was scheduled for surgery on the 4th. She was wanting to know if we were going to be calling in any medications to [facility] pharmacy in [facility] for post op medications. Patient is expecting a call back on what we will be calling in.    Thank you.

## 2023-01-03 ENCOUNTER — ANESTHESIA EVENT (OUTPATIENT)
Dept: PERIOP | Facility: HOSPITAL | Age: 63
End: 2023-01-03
Payer: COMMERCIAL

## 2023-01-03 NOTE — PROGRESS NOTES
"Chief Complaint  Post-op Follow-up (1 week f/u on right breast implant removal)    Subjective          History of Present Illness  Margy Jimenez is a 62 y.o. female who presents to [facility] PLASTIC & RECONSTRUCTIVE SURGERY for Postoperative Follow-Up of Rt breast removal on 1/4/23.  No fevers or chills.  Minimal drainage.  States the drainage continues to be clear.  Patient states that her pain is decreasing but she still intermittently needs narcotics, especially at night.  No concerns.  Drain putting out less than 3CC for 3 days.  Pt would like refill on pain medication.    Allergies: Hydromorphone, Hydromorphone hcl, and Cephalexin  Allergies Reconciled.    Review of Systems   All review of system has been reviewed and it  is negative except the ones note above.     Objective     /85 (BP Location: Right arm, Patient Position: Sitting)   Pulse 80   Temp 97.8 °F (36.6 °C) (Temporal)   Ht 160 cm (62.99\")   Wt 91.9 kg (202 lb 9.6 oz)   SpO2 93%   BMI 35.90 kg/m²     Body mass index is 35.9 kg/m².    Physical Exam  General Inspection: Incision healing well,   Mastectomy incision closed and clean.  Patient has edema, especially to the superior flap.  But there is no fluctuance.  No overlying erythema or induration.  No streaking.  The drainage is thin and serous, clear.  The PUNEET drain was removed.    Result Review :                Assessment and Plan      Diagnoses and all orders for this visit:    1. Postoperative follow-up (Primary)    2. Pre-operative examination  -     oxyCODONE-acetaminophen (Percocet) 5-325 MG per tablet; Take 1 tablet by mouth Every 6 (Six) Hours As Needed for Moderate Pain.  Dispense: 28 tablet; Refill: 0  -     promethazine (PHENERGAN) 25 MG tablet; Take 1 tablet by mouth Every 6 (Six) Hours As Needed for Nausea or Vomiting.  Dispense: 28 tablet; Refill: 0        Plan:  No worrisome changes to the right breast  No evidence of fluid collection or poor " wound healing.  • Rt breast drain was removed today in clinic.  • Refill of Percocet and phenergan will be sent to [facility]          Follow Up     Return in 2 weeks (on 1/25/2023) for 2 week f/u.       Scribed by Tammy Porter, acting as a scribe for Jordan Pandey MD, 01/11/23 16:13 EST.  Jordan Pandey MD's signature on the note affirms that the note adequately documents the care provided.      Patient was given instructions and counseling regarding her condition. Please see specific information pulled into the AVS if appropriate.     Jordan Pandey MD  01/11/2023

## 2023-01-04 ENCOUNTER — HOSPITAL ENCOUNTER (OUTPATIENT)
Facility: HOSPITAL | Age: 63
Setting detail: HOSPITAL OUTPATIENT SURGERY
Discharge: HOME | End: 2023-01-04
Attending: SURGERY | Admitting: SURGERY
Payer: COMMERCIAL

## 2023-01-04 ENCOUNTER — ANESTHESIA (OUTPATIENT)
Dept: PERIOP | Facility: HOSPITAL | Age: 63
End: 2023-01-04
Payer: COMMERCIAL

## 2023-01-04 VITALS
RESPIRATION RATE: 16 BRPM | OXYGEN SATURATION: 98 % | SYSTOLIC BLOOD PRESSURE: 136 MMHG | TEMPERATURE: 97.6 F | HEART RATE: 74 BPM | BODY MASS INDEX: 34.8 KG/M2 | WEIGHT: 196.43 LBS | HEIGHT: 63 IN | DIASTOLIC BLOOD PRESSURE: 88 MMHG

## 2023-01-04 DIAGNOSIS — T85.79XD: ICD-10-CM

## 2023-01-04 PROCEDURE — 87205 SMEAR GRAM STAIN: CPT | Performed by: SURGERY

## 2023-01-04 PROCEDURE — 0 MEPERIDINE PER 100 MG

## 2023-01-04 PROCEDURE — 25010000002 MIDAZOLAM PER 1 MG: Performed by: ANESTHESIOLOGY

## 2023-01-04 PROCEDURE — 87070 CULTURE OTHR SPECIMN AEROBIC: CPT | Performed by: SURGERY

## 2023-01-04 PROCEDURE — 88302 TISSUE EXAM BY PATHOLOGIST: CPT | Performed by: SURGERY

## 2023-01-04 PROCEDURE — 25010000002 DEXAMETHASONE PER 1 MG

## 2023-01-04 PROCEDURE — 25010000002 ONDANSETRON PER 1 MG

## 2023-01-04 PROCEDURE — 25010000002 PROPOFOL 10 MG/ML EMULSION

## 2023-01-04 PROCEDURE — 19328 RMVL INTACT BREAST IMPLANT: CPT | Performed by: SURGERY

## 2023-01-04 PROCEDURE — 25010000002 FENTANYL CITRATE (PF) 50 MCG/ML SOLUTION

## 2023-01-04 RX ORDER — SODIUM CHLORIDE, SODIUM LACTATE, POTASSIUM CHLORIDE, CALCIUM CHLORIDE 600; 310; 30; 20 MG/100ML; MG/100ML; MG/100ML; MG/100ML
9 INJECTION, SOLUTION INTRAVENOUS CONTINUOUS PRN
Status: DISCONTINUED | OUTPATIENT
Start: 2023-01-04 | End: 2023-01-04 | Stop reason: HOSPADM

## 2023-01-04 RX ORDER — FENTANYL CITRATE 50 UG/ML
INJECTION, SOLUTION INTRAMUSCULAR; INTRAVENOUS AS NEEDED
Status: DISCONTINUED | OUTPATIENT
Start: 2023-01-04 | End: 2023-01-04 | Stop reason: SURG

## 2023-01-04 RX ORDER — OXYCODONE HYDROCHLORIDE 5 MG/1
5 TABLET ORAL
Status: COMPLETED | OUTPATIENT
Start: 2023-01-04 | End: 2023-01-04

## 2023-01-04 RX ORDER — MIDAZOLAM HYDROCHLORIDE 1 MG/ML
2 INJECTION INTRAMUSCULAR; INTRAVENOUS ONCE
Status: COMPLETED | OUTPATIENT
Start: 2023-01-04 | End: 2023-01-04

## 2023-01-04 RX ORDER — PROMETHAZINE HYDROCHLORIDE 12.5 MG/1
25 TABLET ORAL ONCE AS NEEDED
Status: DISCONTINUED | OUTPATIENT
Start: 2023-01-04 | End: 2023-01-04 | Stop reason: HOSPADM

## 2023-01-04 RX ORDER — ONDANSETRON 2 MG/ML
INJECTION INTRAMUSCULAR; INTRAVENOUS AS NEEDED
Status: DISCONTINUED | OUTPATIENT
Start: 2023-01-04 | End: 2023-01-04 | Stop reason: SURG

## 2023-01-04 RX ORDER — DEXMEDETOMIDINE HYDROCHLORIDE 100 UG/ML
INJECTION, SOLUTION INTRAVENOUS AS NEEDED
Status: DISCONTINUED | OUTPATIENT
Start: 2023-01-04 | End: 2023-01-04 | Stop reason: SURG

## 2023-01-04 RX ORDER — ONDANSETRON 2 MG/ML
4 INJECTION INTRAMUSCULAR; INTRAVENOUS ONCE AS NEEDED
Status: DISCONTINUED | OUTPATIENT
Start: 2023-01-04 | End: 2023-01-04 | Stop reason: HOSPADM

## 2023-01-04 RX ORDER — MEPERIDINE HYDROCHLORIDE 25 MG/ML
12.5 INJECTION INTRAMUSCULAR; INTRAVENOUS; SUBCUTANEOUS
Status: COMPLETED | OUTPATIENT
Start: 2023-01-04 | End: 2023-01-04

## 2023-01-04 RX ORDER — ACETAMINOPHEN 500 MG
1000 TABLET ORAL ONCE
Status: COMPLETED | OUTPATIENT
Start: 2023-01-04 | End: 2023-01-04

## 2023-01-04 RX ORDER — EPHEDRINE SULFATE 50 MG/ML
INJECTION, SOLUTION INTRAVENOUS AS NEEDED
Status: DISCONTINUED | OUTPATIENT
Start: 2023-01-04 | End: 2023-01-04 | Stop reason: SURG

## 2023-01-04 RX ORDER — MAGNESIUM HYDROXIDE 1200 MG/15ML
LIQUID ORAL AS NEEDED
Status: DISCONTINUED | OUTPATIENT
Start: 2023-01-04 | End: 2023-01-04 | Stop reason: HOSPADM

## 2023-01-04 RX ORDER — PROPOFOL 10 MG/ML
VIAL (ML) INTRAVENOUS AS NEEDED
Status: DISCONTINUED | OUTPATIENT
Start: 2023-01-04 | End: 2023-01-04 | Stop reason: SURG

## 2023-01-04 RX ORDER — PROMETHAZINE HYDROCHLORIDE 25 MG/1
25 SUPPOSITORY RECTAL ONCE AS NEEDED
Status: DISCONTINUED | OUTPATIENT
Start: 2023-01-04 | End: 2023-01-04 | Stop reason: HOSPADM

## 2023-01-04 RX ORDER — PHENYLEPHRINE HCL IN 0.9% NACL 1 MG/10 ML
SYRINGE (ML) INTRAVENOUS AS NEEDED
Status: DISCONTINUED | OUTPATIENT
Start: 2023-01-04 | End: 2023-01-04 | Stop reason: SURG

## 2023-01-04 RX ORDER — DEXAMETHASONE SODIUM PHOSPHATE 4 MG/ML
INJECTION, SOLUTION INTRA-ARTICULAR; INTRALESIONAL; INTRAMUSCULAR; INTRAVENOUS; SOFT TISSUE AS NEEDED
Status: DISCONTINUED | OUTPATIENT
Start: 2023-01-04 | End: 2023-01-04 | Stop reason: SURG

## 2023-01-04 RX ORDER — CLINDAMYCIN PHOSPHATE 900 MG/50ML
900 INJECTION INTRAVENOUS ONCE
Status: COMPLETED | OUTPATIENT
Start: 2023-01-04 | End: 2023-01-04

## 2023-01-04 RX ORDER — LIDOCAINE HYDROCHLORIDE 20 MG/ML
INJECTION, SOLUTION EPIDURAL; INFILTRATION; INTRACAUDAL; PERINEURAL AS NEEDED
Status: DISCONTINUED | OUTPATIENT
Start: 2023-01-04 | End: 2023-01-04 | Stop reason: SURG

## 2023-01-04 RX ADMIN — PROPOFOL 150 MG: 10 INJECTION, EMULSION INTRAVENOUS at 13:02

## 2023-01-04 RX ADMIN — DEXAMETHASONE SODIUM PHOSPHATE 4 MG: 4 INJECTION, SOLUTION INTRA-ARTICULAR; INTRALESIONAL; INTRAMUSCULAR; INTRAVENOUS; SOFT TISSUE at 13:06

## 2023-01-04 RX ADMIN — DEXMEDETOMIDINE HYDROCHLORIDE 20 MCG: 100 INJECTION, SOLUTION, CONCENTRATE INTRAVENOUS at 13:33

## 2023-01-04 RX ADMIN — EPHEDRINE SULFATE 10 MG: 50 INJECTION INTRAVENOUS at 13:11

## 2023-01-04 RX ADMIN — OXYCODONE HYDROCHLORIDE 5 MG: 5 TABLET ORAL at 14:58

## 2023-01-04 RX ADMIN — SODIUM CHLORIDE, POTASSIUM CHLORIDE, SODIUM LACTATE AND CALCIUM CHLORIDE: 600; 310; 30; 20 INJECTION, SOLUTION INTRAVENOUS at 13:02

## 2023-01-04 RX ADMIN — MIDAZOLAM HYDROCHLORIDE 2 MG: 2 INJECTION, SOLUTION INTRAMUSCULAR; INTRAVENOUS at 12:15

## 2023-01-04 RX ADMIN — ONDANSETRON 4 MG: 2 INJECTION INTRAMUSCULAR; INTRAVENOUS at 14:00

## 2023-01-04 RX ADMIN — MEPERIDINE HYDROCHLORIDE 12.5 MG: 25 INJECTION, SOLUTION INTRAMUSCULAR; INTRAVENOUS; SUBCUTANEOUS at 14:58

## 2023-01-04 RX ADMIN — MEPERIDINE HYDROCHLORIDE 12.5 MG: 25 INJECTION, SOLUTION INTRAMUSCULAR; INTRAVENOUS; SUBCUTANEOUS at 14:39

## 2023-01-04 RX ADMIN — ACETAMINOPHEN 1000 MG: 500 TABLET ORAL at 11:17

## 2023-01-04 RX ADMIN — EPHEDRINE SULFATE 10 MG: 50 INJECTION INTRAVENOUS at 13:44

## 2023-01-04 RX ADMIN — FENTANYL CITRATE 25 MCG: 50 INJECTION, SOLUTION INTRAMUSCULAR; INTRAVENOUS at 14:06

## 2023-01-04 RX ADMIN — CLINDAMYCIN IN 5 PERCENT DEXTROSE 900 MG: 18 INJECTION, SOLUTION INTRAVENOUS at 13:05

## 2023-01-04 RX ADMIN — LIDOCAINE HYDROCHLORIDE 50 MG: 20 INJECTION, SOLUTION EPIDURAL; INFILTRATION; INTRACAUDAL; PERINEURAL at 13:02

## 2023-01-04 RX ADMIN — DEXMEDETOMIDINE HYDROCHLORIDE 10 MCG: 100 INJECTION, SOLUTION, CONCENTRATE INTRAVENOUS at 13:51

## 2023-01-04 RX ADMIN — OXYCODONE HYDROCHLORIDE 5 MG: 5 TABLET ORAL at 14:39

## 2023-01-04 RX ADMIN — FENTANYL CITRATE 100 MCG: 50 INJECTION, SOLUTION INTRAMUSCULAR; INTRAVENOUS at 13:02

## 2023-01-04 RX ADMIN — Medication 100 MCG: at 13:20

## 2023-01-04 RX ADMIN — FENTANYL CITRATE 25 MCG: 50 INJECTION, SOLUTION INTRAMUSCULAR; INTRAVENOUS at 13:36

## 2023-01-04 RX ADMIN — FENTANYL CITRATE 50 MCG: 50 INJECTION, SOLUTION INTRAMUSCULAR; INTRAVENOUS at 14:25

## 2023-01-04 NOTE — ANESTHESIA POSTPROCEDURE EVALUATION
Patient: Maryg Jimenez    Procedure Summary     Date: 01/04/23 Room / Location: [facility] OR  / [facility]    Anesthesia Start: 1255 Anesthesia Stop:     Procedure: BREAST IMPLANT REMOVAL (Right: Breast) Diagnosis:       Infected breast tissue expander, subsequent encounter      (Infected breast tissue expander, subsequent encounter [T85.79XD])    Surgeons: Jordan Pandey MD Provider: Robert Ruvalcaba MD    Anesthesia Type: general ASA Status: 2          Anesthesia Type: general    Vitals  Vitals Value Taken Time   BP     Temp     Pulse 96 01/04/23 1426   Resp     SpO2 92 % 01/04/23 1426   Vitals shown include unvalidated device data.        Post Anesthesia Care and Evaluation    Patient location during evaluation: bedside  Patient participation: complete - patient participated  Level of consciousness: awake and alert  Pain management: adequate    Airway patency: patent  Anesthetic complications: No anesthetic complications  PONV Status: none  Cardiovascular status: acceptable  Respiratory status: acceptable  Hydration status: acceptable    Comments: An Anesthesiologist personally participated in the most demanding procedures (including induction and emergence if applicable) in the anesthesia plan, monitored the course of anesthesia administration at frequent intervals and remained physically present and available for immediate diagnosis and treatment of emergencies.

## 2023-01-04 NOTE — OP NOTE
BREAST TISSUE EXPANDER REMOVAL INSERTION OF IMPLANT  Procedure Report    Patient Name:  Margy Jimenez  YOB: 1960    Date of Surgery:  1/4/2023     Indications: Infected right breast implant    Pre-op Diagnosis:   Infected breast tissue expander, subsequent encounter [T85.79XD]       Post-Op Diagnosis Codes:     * Infected breast tissue expander, subsequent encounter [T85.79XD]    Procedure/CPT® Codes:24264      Procedure(s):  BREAST IMPLANT REMOVAL        Staff:  Surgeon(s):  Jordan Pandey MD    Assistant: Diane Delacruz    Anesthesia: General    Estimated Blood Loss: 200 mL    Implants:    Implant Name Type Inv. Item Serial No.  Lot No. LRB No. Used Action   BRST RAZA HSCPLS OPUSLUXE [facility] RND HI/PROJ 505CC - [id number] - [id number] Implant BRST RAZA HSCPLS OPUSLUXE SMO RND HI/PROJ [id number] SIENTRA REF # ABOVE Right 1 Explanted   GRFT TISS ALLODERM RTM PERF [id number] 2.4MM/THK .4MM - [id number] Implant GRFT TISS ALLODERM RTM PERF [id number] 2.4MM/THK .4MM  [facility] [id number] Right 1 Explanted       Specimen:          Specimens     ID Source Type Tests Collected By Collected At Frozen?    1 Breast, Right Wound · WOUND CULTURE   Jordan Pandey MD 1/4/23 1323     Description: Right breast culture    This specimen was not marked as sent.    A Breast, Right Tissue · TISSUE PATHOLOGY EXAM   Jordan Pandey MD 1/4/23 1336     Description: Right breast capsule    This specimen was not marked as sent.    B Breast, Right Tissue · TISSUE PATHOLOGY EXAM   Jordan Pandey MD 1/4/23 1343     Description: Right breast implant    This specimen was not marked as sent.              Findings: Removal of surrounding AlloDerm, removal of some incorporated gala flex    Complications: None    Description of Procedure:   The patient is a 62-year-old white female with a history of breast cancer status post bilateral mastectomies.   She now presents with an infected right breast implant and persistent drainage as well as some malaise.  The patient has been started on antibiotics with no improvement.  Patient presents today for removal of the infected infected right breast implant.  The risks and benefits of the procedure were discussed.  Her questions were answered.  She agreed to the procedure.  Patient was marked in the holding area and brought back to the operating placed in supine position.  General anesthesia was induced, her chest was sterilely prepped and draped in standard surgical fashion began by opening the mastectomy incision on the right side.  The AlloDerm was intact.  Cultures were obtained of the fluid around the device.  The AlloDerm was then dissected off of the surrounding tissue, there were some portions that were incorporated, or at least beginning to incorporate.  This was then removed after removing the sutures.  The implant had been removed as soon as an adequate opening was created.  The implant was intact with no evidence of rupture or leak.  Examination of the mastectomy pocket demonstrated some retained or incorporated gala flex along the inferior flap as well as some thickened scar tissue along the superior flap.  This was all excised, dissecting it off the underlying tissue.  Hemostasis was then obtained with electrocautery.  The wound was then copiously irrigated with Irrisept and then closed over a drain.  The drain was brought out through a separate stab incision.  The mastectomy was then closed in 1 layer with a running subcuticular 2-0 Monocryl.  The Monocryl was also used to secure the drain stitch.  At the end of the case she was taken extubated to recovery in stable condition.                Assistant: Diane Delacruz  was responsible for performing the following activities: Retraction, Suction and Irrigation and their skilled assistance was necessary for the success of this case.    Jordan Tee  MD Dannie     Date: 1/4/2023  Time: 14:26 EST

## 2023-01-04 NOTE — DISCHARGE INSTRUCTIONS
DISCHARGE INSTRUCTIONS  SURGICAL / AMBULATORY  PROCEDURES      For your surgery you had:  General anesthesia (you may have a sore throat for the first 24 hours)  IV sedation.  Local anesthesia  Monitored anesthesia Care  You received a medicated patch for nausea prevention today (behind your ear). It is recommended that you remove it 24-48 hours post-operatively. It must be removed within 72 hours.   You have received an anesthesia medication today that can cause hormonal forms of birth control to be ineffective. You should use a different form of birth control (to prevent pregnancy) for 7 days.   You may experience dizziness, drowsiness, or light-headedness for several hours following surgery/procedure.  Do not stay alone today or tonight.  Limit your activity for 24 hours.  Resume your diet slowly.  Follow whatever special dietary instructions you may have been given by your doctor.  You should not drive or operate machinery, drink alcohol, or sign legally binding documents for 24 hours or while you are taking pain medication.  Last dose of pain medication was given at:   .  NOTIFY YOUR DOCTOR IF YOU EXPERIENCE ANY OF THE FOLLOWING:  Temperature greater than 101 degrees Fahrenheit  Shaking Chills  Redness or excessive drainage from incision  Nausea, vomiting and/or pain that is not controlled by prescribed medications  Increase in bleeding or bleeding that is excessive  Unable to urinate in 6 hours after surgery  If unable to reach your doctor, please go to the closest Emergency Room  You may begin dressing changes:     [] in 24 hours   [] in 48 hours   [] Other:    You may remove Band-Aid/dressing tomorrow.  You may shower or bathe   .  Apply an ice pack 24-48 hours.  Medications per physician instructions as indicated on Discharge Medication Information Sheet.  You should see   for follow-up care   on   .  Phone number:       SPECIAL INSTRUCTIONS:                                    Wash the drain site  with water and liquid soap every day. No sponge or cloths. Wash the drain site before other parts of the body.  Strip drains every 8 hours and record drain output daily. Wash hands or wear gloves when manipulating drains.   Ok for regular diet.  Do not drive for next 2 weeks or if taking pain medication.   Ok to shower but be aware you are a fall risk so have someone with you or place a chair in the shower. It is ok to wet the breast, simply let the water/soap run over the area. No sponge or cloths.   Drain: Wash the drain site with water and liquid soap every day. No sponge or cloths. Wash the drain site before other parts of the body.  Strip drains every 8 hours and record drain output daily. Wash hands or wear gloves when manipulating drains.  Do not exercise for the next 2 weeks.  Sleep in an upright position.  Ok to move arms slowly to the shoulder level (brushing hair movement).    Take post-operative medications as directed on the bottle.     If you experience any issues or concerns, please contact the office at [phone]. If after hours a call service will notify the on-call provider.

## 2023-01-04 NOTE — ANESTHESIA PREPROCEDURE EVALUATION
Anesthesia Evaluation     Patient summary reviewed and Nursing notes reviewed   no history of anesthetic complications:  NPO Solid Status: > 8 hours  NPO Liquid Status: > 2 hours           Airway   Mallampati: II  TM distance: >3 FB  Neck ROM: full  No difficulty expected  Dental      Pulmonary - negative pulmonary ROS and normal exam    breath sounds clear to auscultation  Cardiovascular - normal exam  Exercise tolerance: good (4-7 METS)    Rhythm: regular  Rate: normal    (+) hypertension well controlled, hyperlipidemia,       Neuro/Psych  (+) headaches,    GI/Hepatic/Renal/Endo    (+)  GERD well controlled,      Musculoskeletal     Abdominal    Substance History - negative use     OB/GYN negative ob/gyn ROS         Other   arthritis,    history of cancer remission    ROS/Med Hx Other: PAT Nursing Notes unavailable.                   Anesthesia Plan    ASA 2     general     (Patient understands anesthesia not responsible for dental damage.)  intravenous induction     Anesthetic plan, risks, benefits, and alternatives have been provided, discussed and informed consent has been obtained with: patient.  Pre-procedure education provided  Plan discussed with CRNA.        CODE STATUS:

## 2023-01-07 LAB
BACTERIA SPEC AEROBE CULT: NORMAL
GRAM STN SPEC: NORMAL

## 2023-01-11 ENCOUNTER — OFFICE VISIT (OUTPATIENT)
Dept: PLASTIC SURGERY | Facility: CLINIC | Age: 63
End: 2023-01-11
Payer: COMMERCIAL

## 2023-01-11 VITALS
HEIGHT: 63 IN | BODY MASS INDEX: 35.9 KG/M2 | WEIGHT: 202.6 LBS | SYSTOLIC BLOOD PRESSURE: 134 MMHG | TEMPERATURE: 97.8 F | HEART RATE: 80 BPM | OXYGEN SATURATION: 93 % | DIASTOLIC BLOOD PRESSURE: 85 MMHG

## 2023-01-11 DIAGNOSIS — Z09 POSTOPERATIVE FOLLOW-UP: Primary | ICD-10-CM

## 2023-01-11 DIAGNOSIS — Z01.818 PRE-OPERATIVE EXAMINATION: ICD-10-CM

## 2023-01-11 PROCEDURE — 99024 POSTOP FOLLOW-UP VISIT: CPT | Performed by: SURGERY

## 2023-01-11 RX ORDER — OXYCODONE HYDROCHLORIDE AND ACETAMINOPHEN 5; 325 MG/1; MG/1
1 TABLET ORAL EVERY 6 HOURS PRN
Qty: 28 TABLET | Refills: 0 | Status: SHIPPED | OUTPATIENT
Start: 2023-01-11

## 2023-01-11 RX ORDER — PROMETHAZINE HYDROCHLORIDE 25 MG/1
25 TABLET ORAL EVERY 6 HOURS PRN
Qty: 28 TABLET | Refills: 0 | Status: SHIPPED | OUTPATIENT
Start: 2023-01-11

## 2023-01-16 NOTE — PROGRESS NOTES
"Chief Complaint  Post-op Follow-up (3 week post op f/u )    Subjective          History of Present Illness  Margy Jimenez is a 62 y.o. female who presents to [facility] for Postoperative Follow-Up of breast rt implant removal on 1/4/2023.    Pt states her rt side where the implant was removed is a little swollen. Denies any drainage or redness.  Pt states she has been having pain on left breast since surgery. Pt states the pain only occurs when she moves a certain way or lays on it.  No fevers or chills.  Normal appetite.  Denies any drainage or erythema from the left breast.    Pt is being followed by OT/lymphedema      Allergies: Hydromorphone, Hydromorphone hcl, and Cephalexin  Allergies Reconciled.    Review of Systems   All review of system has been reviewed and it  is negative except the ones note above.     Objective     /87 (BP Location: Right arm, Patient Position: Sitting)   Pulse 65   Temp 98.4 °F (36.9 °C) (Temporal)   Ht 160 cm (63\")   Wt 90.3 kg (199 lb)   SpO2 93%   BMI 35.25 kg/m²     Body mass index is 35.25 kg/m².    Physical Exam  General Inspection: Incision healing well, no swelling, no erythema, no drainage.  Nontender.  No fluctuance.  Left breast  Mastectomy incision closed and clean.  Implant soft and mobile within the pocket.  Palpable wrinkling, but not visible wrinkling.  No evidence of capsular contracture.  No evidence of fluid collection.  No overlying erythema or induration.  No evidence of cellulitis.    Result Review :                Assessment and Plan      Diagnoses and all orders for this visit:    1. Postoperative follow-up (Primary)        Plan:  Left breast pain appears to be related to normal healing.  Right chest doing well after removal of infected implant with no evidence of infection or fluid collection    Long discussion with patient regarding the continuation of her breast reconstruction.  The plan " is to place a tissue expander and a prepectoral plane in May (the week of May 7 through May 13).  This will then be expanded in clinic.  And then she will undergo implant exchange towards the end of the year with an implant that matches the left side.  The risks and benefits of tissue expansion were discussed with the patient.  The risk described included, but not limited to, bleeding, infection, need for revision surgeries, seroma, hematoma, poor cosmetic result, poor functional result, edge necrosis, edge separation, deep venous thrombosis, pulmonary embolus, pneumonia, heart attack, stroke, death, infection of the expander.    The patient has some swelling on the upper outer portion of the right chest.  This appears to be normal postoperative changes.  Patient reassured this will continue to improve over time.  Continue with lymphatic massage to this area.     Discusses left breast pain with patient, no signs of infection.   Discussed time frame with pt on when the extender can be placed; recommend early     May or after; late April for pre op.   Advised pt is no longer on restrictions.     CPT code 87797    [id number] ALLERGAN        Follow Up   Pt will rtn to clinic for pre op once surgery is schd for left breast extender    Return for pre op once surgery is schd for extender .     Scribed by Tammy Porter, acting as a scribe for Jordan Pandey MD, 01/25/23 10:15 EST.  Jordan Pandey MD's signature on the note affirms that the note adequately documents the care provided.      Patient was given instructions and counseling regarding her condition. Please see specific information pulled into the AVS if appropriate.     Jodran Pandey MD  01/25/2023

## 2023-01-24 ENCOUNTER — HOSPITAL ENCOUNTER (OUTPATIENT)
Dept: OCCUPATIONAL THERAPY | Facility: HOSPITAL | Age: 63
Setting detail: THERAPIES SERIES
Discharge: HOME | End: 2023-01-24
Payer: COMMERCIAL

## 2023-01-24 DIAGNOSIS — Z17.0 MALIGNANT NEOPLASM OF LEFT BREAST IN FEMALE, ESTROGEN RECEPTOR POSITIVE, UNSPECIFIED SITE OF BREAST: ICD-10-CM

## 2023-01-24 DIAGNOSIS — C50.912 MALIGNANT NEOPLASM OF LEFT BREAST IN FEMALE, ESTROGEN RECEPTOR POSITIVE, UNSPECIFIED SITE OF BREAST: ICD-10-CM

## 2023-01-24 DIAGNOSIS — Z91.89 AT RISK FOR LYMPHEDEMA: Primary | ICD-10-CM

## 2023-01-24 PROCEDURE — 93702 BIS XTRACELL FLUID ANALYSIS: CPT

## 2023-01-24 PROCEDURE — 97535 SELF CARE MNGMENT TRAINING: CPT

## 2023-01-24 NOTE — THERAPY RE-EVALUATION
Outpatient Occupational Therapy Lymphedema Re-Evaluation   [facility]     Patient Name: Margy Jimenez  : 1960  MRN: [id number]  Today's Date: 2023      Visit Date: 2023    Patient Active Problem List   Diagnosis   • High blood pressure   • Mood disorder (HCC)   • Anxiety   • Biceps tendinitis, right   • Fatigue   • GERD (gastroesophageal reflux disease)   • Hyperlipidemia   • Migraine   • Nontraumatic incomplete tear of right rotator cuff   • Pain and swelling of right shoulder   • S/P rotator cuff repair   • Shoulder impingement, right   • Hypertension   • Depression   • Gastroesophageal reflux disease   • Heartburn   • Malignant neoplasm of left breast in female, estrogen receptor positive (HCC)   • S/P bilateral mastectomy   • Status post bilateral breast reconstruction with implant placement   • Postoperative follow-up   • Contour irregularity in reconstructed breast   • Breast asymmetry   • Infected breast tissue expander, subsequent encounter        Past Medical History:   Diagnosis Date   • Bladder atonia     LAZY BLADDER   • Cancer (HCC)     LEFT BREAST. NO BP OR NEEDLE STICKS IN LEFT ARM.  DID NOT REQUIRE CHEMO OR RADIATION   • Depression    • GERD (gastroesophageal reflux disease)    • High blood pressure    • Hyperlipidemia    • Neoplasm of left breast    • S/P bilateral mastectomy 2021   • Status post bilateral breast reconstruction with implant placement 2021        Past Surgical History:   Procedure Laterality Date   • ABDOMINAL HYSTERECTOMY     • BREAST AUGMENTATION Bilateral 2021    Procedure: BILATERAL BREAST RECONSTRUCTION WITH IMPLANTS, IMPLANT OF BIOLOGICAL MESH,  USE OF SPY, AND BILATERAL CHEST LIPOSUCTION;  Surgeon: Neena Pires MD;  [facility]: [facility] OR [facility];  Service: Plastics;  Laterality: Bilateral;   • BREAST BIOPSY Left 10/26/2021    Procedure: LEFT BREAST LUMPECTOMY WITH SENTINEL NODE BIOPSY AND NEEDLE LOCALIZATION;  Surgeon:  Ayah Chaparro MD;  Location: [facility] OR;  Service: General;  Laterality: Left;   • BREAST RECONSTRUCTION Bilateral 04/27/2022    Procedure: BREAST RECONSTRUCTION REVISION  with bilateral skin resection, bilateral nipple reconstruction.;  Surgeon: Neena Pires MD;  Location: [facility] OR [facility];  Service: Plastics;  Laterality: Bilateral;   • BREAST RECONSTRUCTION Bilateral 12/06/2022    Procedure: BREAST RECONSTRUCTION REVISION;  Surgeon: Neena Pires MD;  Location: [facility] OR [facility];  Service: Plastics;  Laterality: Bilateral;   • BREAST TISSUE EXPANDER REMOVAL INSERTION OF IMPLANT Right 1/4/2023    Procedure: BREAST IMPLANT REMOVAL;  Surgeon: Jordan Pandey MD;  Location: [facility];  Service: Plastics;  Laterality: Right;   • BUNIONECTOMY Left    • COLONOSCOPY      aprrox 2018    • COLONOSCOPY N/A 08/31/2021    Procedure: COLONOSCOPY WITH INJECTION ORISE/POLYPECTOMY HOT SNARE/ENDO CLIP X3;  Surgeon: David Hernandez MD;  Location: [facility] ENDOS[facility];  Service: Gastroenterology;  Laterality: N/A;  COLON POLYP   • CYSTOSCOPY     • ENDOSCOPY     • ENDOSCOPY N/A 08/31/2021    Procedure: ESOPHAGOGASTRODUODENOSCOPY WITH BIOPSY;  Surgeon: David Hernandez MD;  Location: [facility]OS[facility];  Service: Gastroenterology;  Laterality: N/A;  GASTRIC POLYPS/REFLUX ESOPHAGITIS   • LAPAROSCOPIC CHOLECYSTECTOMY     • MASTECTOMY Bilateral 12/07/2021    Procedure: MASTECTOMY;  Surgeon: Ayah Chaparro MD;  Location: [facility];  Service: General;  Laterality: Bilateral;   • ROTATOR CUFF REPAIR Right          Visit Dx:     ICD-10-CM ICD-9-CM   1. At risk for lymphedema  Z91.89 V49.89   2. Malignant neoplasm of left breast in female, estrogen receptor positive, unspecified site of breast (HCC)  C50.912 174.9    Z17.0 V86.0            Lymphedema     Row Name 01/24/23 1500             Subjective Pain    Able to rate subjective pain? yes  -SF      Pre-Treatment Pain Level 0  -SF       "Post-Treatment Pain Level 0  -SF      Subjective Pain Comment Patient denies pain  -SF         Subjective Comments    Subjective Comments Patient reports recent procedure where she had R breast implant removed. Patient reports possible cording in R axilla.  -SF         Lymphedema Assessment    Lymphedema Classification LUE:;at risk/stage 0  -SF      Lymphedema Cancer Related Sx bilateral;simple mastectomy;sentinel node biopsy  -SF      Lymph Nodes Removed # 4  -SF      Positive Lymph Nodes # 0  -SF      Chemo Received yes  -SF      Chemo Treatments #/Timeframe Anastrozole  -SF      Radiation Therapy Received no  -SF         LLIS - Physical Concerns    The amount of pain associated with my lymphedema is: 0  -SF      The amount of limb heaviness associated with my lymphedema is: 0  -SF      The amount of skin tightness associated with my lymphedema is: 0  -SF      The size of my swollen limb(s) seems: 0  -SF      Lymphedema affects the movement of my swollen limb(s): 0  -SF      The strength in my swollen limb(s) is: 0  -SF         LLIS - Psychosocial Concerns    Lymphedema affects my body image (i.e., \"how I think I look\"). 0  -SF      Lymphedema affects my socializing with others. 0  -SF      Lymphedema affects my intimate relations with spouse or partner (rate 0 if not applicable 0  -SF      Lymphedema \"gets me down\" (i.e., depression, frustration, or anger) 0  -SF      I must rely on others for help due to my lymphedema. 0  -SF      I know what to do to manage my lymphedema 1  -SF         LLIS - Functional Concerns    Lymphedema affects my ability to perform self-care activities (i.e. eating, dressing, hygiene) 0  -SF      Lymphedema affects my ability to perform routine home or work-related activities. 0  -SF      Lymphedema affects my performance of preferred leisure activities. 0  -SF      Lymphedema affects proper fit of clothing/shoes 0  -SF      Lymphedema affects my sleep 0  -SF         Posture/Observations "    Posture- WNL Posture is WNL  -SF         General ROM    GENERAL ROM COMMENTS NT on this date  -SF         MMT (Manual Muscle Testing)    General MMT Comments NT on this date  -SF         Skin Changes/Observations    Location/Assessment Upper Quadrant  -SF      Upper Quadrant Conditions right:;intact;left:;normal  -SF      Upper Quadrant Color/Pigment right:;erythema  Erythema noted around incision. Incision is intact and healing well.  -SF      Skin Observations Comment Small amount of swelling noted just proximal to incision on R chest all. Small lymphatic cord palpated in R axilla. Cording was released.  -SF         Lymphedema Measurements    Measurement Type(s) Volumetric  -SF      Volumetric Areas Upper extremities  -SF         L-Dex Bioimpedence Screening    L-Dex Measurement Extremity LUE  -SF      L-Dex Patient Position Standing  -SF      L-Dex UE Dominate Side Right  -SF      L-Dex UE At Risk Side Left  -SF      L-Dex UE Pre Surgical Value Yes  -SF      L-Dex UE Score 3.1  -SF      L-Dex UE Baseline Score 2.2  -SF      L-Dex UE Value Change 0.9  -SF      $ L-Dex Charge yes  -SF         Lymphedema Life Impact Scale Totals    A.  Total Q1 - Q17 (Do not include Q18) 1  -SF      B.  Total number of questions answered (Q1-Q17) 17  -SF      C. Divide A by B 0.06  -SF      D. Multiple C by 25 1.5  -SF            User Key  (r) = Recorded By, (t) = Taken By, (c) = Cosigned By    Initials Name Provider Type    Rubi Montano OT Occupational Therapist                        Therapy Education  Education Details: OT reviewed self manual lymphatic drainage to perform x2 daily over R chest wall to decrease edema and facilitate healing. Patient was provided w/ handout and demonstration. OT reviewed scar massage on this date w/ handout provided. OT reviewed stop light to recover tool on this date and patient was encouraged to follow up w/ Lymphedema clinic before 3 month follow-up if experiencing symptoms. Patient was  provided w/ bye-bye exercises to address lymphatic cording.  Given: Symptoms/condition management, Edema management  84141 - OT Self Care/Mgmt Minutes: 43         OT Goals     Row Name 01/24/23 1614          Time Calculation    OT Goal Re-Cert Due Date 02/23/23  -SF           User Key  (r) = Recorded By, (t) = Taken By, (c) = Cosigned By    Initials Name Provider Type    SF Rubi Solano OT Occupational Therapist                 OT Assessment/Plan     Row Name 01/24/23 1610          OT Assessment    Functional Limitations Performance in self-care ADL;Other (comment)  -SF     Impairments Impaired lymphatic circulation  -SF     Assessment Comments Patient demonstrates normalized lymphatic functioning on this date. Patient was provided w/ review of self MLD to address small amount of edema in R chest wall following reconstructive surgery. Patient verbalized interest in obtaining breast prosthesis to achieve symmetry in chest wall. Patient will benefit from continued skilled occupational therapy services to evaluate ongoing lymphatic functioning to prevent further advancement in lymphedema staging.  -SF     OT Diagnosis At risk for lymphedema  -SF     OT Rehab Potential Excellent  -SF     Patient/caregiver participated in establishment of treatment plan and goals Yes  -SF     Patient would benefit from skilled therapy intervention Yes  -SF        OT Plan    OT Frequency Other (comment)  -SF     Predicted Duration of Therapy Intervention (OT) Continue to reevaluate every 3 months years 1-3 and every 6 months years 4 and 5.  -SF     Planned CPT's? OT RE-EVAL: 12228;OT NEUROMUSC RE EDUCATION EA 15 MIN: 66126;OT SELF CARE/MGMT/TRAIN 15 MIN: 06548;OT HOT/COLD PACK;OT ULTRASOUND EA 15 MIN: 95070;OT MANUAL THERAPY EA 15 MIN: 76695;OT BIS XTRACELL FLUID ANALYSIS: 89643;OT ORTHOTIC MGMT/TRAIN EA 15 MIN: 19810;OT ORTHO/PROSTHET CHECKOUT EA 15 MIN: 27751  -SF     Planned Therapy Interventions (Optional Details) prosthetic  fitting/training;patient/family education;postural re-education;ROM (Range of Motion);orthotic fitting/training;manual therapy techniques;home exercise program;strengthening;stretching  -SF     OT Plan Comments Continue w/ POC  -SF           User Key  (r) = Recorded By, (t) = Taken By, (c) = Cosigned By    Initials Name Provider Type    SF Rubi Solano OT Occupational Therapist                  GOALS:  1. Post Breast Surgery Care/at risk for Lymphedema   LTG 1b: 90 days:  As an indicator of no exacerbation of lymphedema staging, the patient will present with an L-Dex score less than 7 points from preoperative baseline.              STATUS: Met: ongoing              STG 1a:   30 days: To prevent exacerbation of mixed edema to lymphedema, patient will utilize the 2 postsurgical compression garments daily.                 STATUS: Met:  Discontinue  STG 1b: 30 days: Patient will be independent with self-manual lymphatic massage.               STATUS: Met: ongoing  STG 1c: 30 days:  Patient will be independent with identification of signs and symptoms of lymphedema exasperation per stoplight to recovery education handout.              STATUS: Met: ongoing  STG 1 d: 30 days: Patient will be independent with HEP to prevent advancement in lymphedema staging.              STATUS: Met: ongoing  TREATMENT:  Self Care/ADL retraining, Therapeutic Activity, Neuromuscular Re-education, Therapeutic Exercise, Bioimpedence Fluid Analysis, [id number], Orthotic Management and training,  and Manual Therapy.         Time Calculation:   Timed Charges  75484 - OT Self Care/Mgmt Minutes: 43  Total Minutes  Timed Charges Total Minutes: 43   Total Minutes: 43     Therapy Charges for Today     Code Description Service Date Service Provider Modifiers Qty    09403544502 HC PT BIS XTRACELL FLUID ANALYSIS 1/24/2023 Rubi Solano OT  1    [id number]  OT SELF CARE/MGMT/TRAIN EA 15 MIN 1/24/2023  Rubi Solano, OT GO 3                    Rubi Solano, OT  1/24/2023

## 2023-01-25 ENCOUNTER — PREP FOR SURGERY (OUTPATIENT)
Dept: OTHER | Facility: HOSPITAL | Age: 63
End: 2023-01-25
Payer: COMMERCIAL

## 2023-01-25 ENCOUNTER — OFFICE VISIT (OUTPATIENT)
Dept: PLASTIC SURGERY | Facility: CLINIC | Age: 63
End: 2023-01-25
Payer: COMMERCIAL

## 2023-01-25 VITALS
OXYGEN SATURATION: 93 % | SYSTOLIC BLOOD PRESSURE: 136 MMHG | HEIGHT: 63 IN | BODY MASS INDEX: 35.26 KG/M2 | WEIGHT: 199 LBS | DIASTOLIC BLOOD PRESSURE: 87 MMHG | TEMPERATURE: 98.4 F | HEART RATE: 65 BPM

## 2023-01-25 DIAGNOSIS — Z09 POSTOPERATIVE FOLLOW-UP: Primary | ICD-10-CM

## 2023-01-25 DIAGNOSIS — Z17.0 MALIGNANT NEOPLASM OF LEFT BREAST IN FEMALE, ESTROGEN RECEPTOR POSITIVE, UNSPECIFIED SITE OF BREAST: Primary | ICD-10-CM

## 2023-01-25 DIAGNOSIS — C50.912 MALIGNANT NEOPLASM OF LEFT BREAST IN FEMALE, ESTROGEN RECEPTOR POSITIVE, UNSPECIFIED SITE OF BREAST: Primary | ICD-10-CM

## 2023-01-25 PROCEDURE — 99024 POSTOP FOLLOW-UP VISIT: CPT | Performed by: SURGERY

## 2023-01-25 RX ORDER — CLINDAMYCIN PHOSPHATE 900 MG/50ML
900 INJECTION INTRAVENOUS ONCE
Status: CANCELLED | OUTPATIENT
Start: 2023-01-25 | End: 2023-01-25

## 2023-02-13 ENCOUNTER — HOSPITAL ENCOUNTER (OUTPATIENT)
Dept: OCCUPATIONAL THERAPY | Facility: HOSPITAL | Age: 63
Setting detail: THERAPIES SERIES
Discharge: HOME | End: 2023-02-13
Payer: COMMERCIAL

## 2023-02-13 ENCOUNTER — TELEPHONE (OUTPATIENT)
Dept: PLASTIC SURGERY | Facility: CLINIC | Age: 63
End: 2023-02-13
Payer: COMMERCIAL

## 2023-02-13 DIAGNOSIS — Z90.13 HISTORY OF MASTECTOMY, BILATERAL: Primary | ICD-10-CM

## 2023-02-13 DIAGNOSIS — Z90.10 DEFORMITY DUE TO MASTECTOMY: ICD-10-CM

## 2023-02-13 DIAGNOSIS — N64.89 DEFORMITY DUE TO MASTECTOMY: ICD-10-CM

## 2023-02-13 DIAGNOSIS — B37.2 YEAST DERMATITIS: Primary | ICD-10-CM

## 2023-02-13 DIAGNOSIS — Z44.9 FITTING AND ADJUSTMENT OF UNSPECIFIED PROSTHETIC DEVICE: ICD-10-CM

## 2023-02-13 DIAGNOSIS — Z91.89 AT RISK FOR LYMPHEDEMA: ICD-10-CM

## 2023-02-13 PROCEDURE — 97165 OT EVAL LOW COMPLEX 30 MIN: CPT | Performed by: OCCUPATIONAL THERAPIST

## 2023-02-13 PROCEDURE — L8000 MASTECTOMY BRA: HCPCS | Performed by: OCCUPATIONAL THERAPIST

## 2023-02-13 PROCEDURE — L8030 BREAST PROSTHES W/O ADHESIVE: HCPCS | Performed by: OCCUPATIONAL THERAPIST

## 2023-02-13 RX ORDER — FLUCONAZOLE 150 MG/1
150 TABLET ORAL DAILY
Qty: 3 TABLET | Refills: 0 | Status: SHIPPED | OUTPATIENT
Start: 2023-02-13 | End: 2023-02-16

## 2023-02-13 NOTE — TELEPHONE ENCOUNTER
Per Dr Pandey, she can use OTC anti fungal cream and we will send in 3 days of Diflucan for her as well. Advised patient and she verbalized understanding

## 2023-02-13 NOTE — TELEPHONE ENCOUNTER
Patient called stating she thinks she has a yeast infection on the right breast. She had surgery 01/04/2023. No open areas or drainage, she states it has a yeast smell. Can she use anti fungal cream or what do you recommend? Please advise

## 2023-02-13 NOTE — THERAPY EVALUATION
Outpatient Occupational Therapy Lymphedema Initial Evaluation   [place]     Patient Name: Margy Jimenez  : 1960  MRN: [id number]  Today's Date: 2023      Visit Date: 2023    Patient Active Problem List   Diagnosis   • High blood pressure   • Mood disorder (HCC)   • Anxiety   • Biceps tendinitis, right   • Fatigue   • GERD (gastroesophageal reflux disease)   • Hyperlipidemia   • Migraine   • Nontraumatic incomplete tear of right rotator cuff   • Pain and swelling of right shoulder   • S/P rotator cuff repair   • Shoulder impingement, right   • Hypertension   • Depression   • Gastroesophageal reflux disease   • Heartburn   • Malignant neoplasm of left breast in female, estrogen receptor positive (HCC)   • S/P bilateral mastectomy   • Status post bilateral breast reconstruction with implant placement   • Postoperative follow-up   • Contour irregularity in reconstructed breast   • Breast asymmetry   • Infected breast tissue expander, subsequent encounter        Past Medical History:   Diagnosis Date   • Bladder atonia     LAZY BLADDER   • Cancer (HCC)     LEFT BREAST. NO BP OR NEEDLE STICKS IN LEFT ARM.  DID NOT REQUIRE CHEMO OR RADIATION   • Depression    • GERD (gastroesophageal reflux disease)    • High blood pressure    • Hyperlipidemia    • Neoplasm of left breast    • S/P bilateral mastectomy 2021   • Status post bilateral breast reconstruction with implant placement 2021        Past Surgical History:   Procedure Laterality Date   • ABDOMINAL HYSTERECTOMY     • BREAST AUGMENTATION Bilateral 2021    Procedure: BILATERAL BREAST RECONSTRUCTION WITH IMPLANTS, IMPLANT OF BIOLOGICAL MESH,  USE OF SPY, AND BILATERAL CHEST LIPOSUCTION;  Surgeon: Neena Pires MD;  [place] [facility] [place] [facility] [place]  Service: Plastics;  Laterality: Bilateral;   • BREAST BIOPSY Left 10/26/2021    Procedure: LEFT BREAST LUMPECTOMY WITH SENTINEL NODE BIOPSY AND NEEDLE LOCALIZATION;  Surgeon:  Ayah Chaparro MD;  Location: [facility] MAIN OR;  Service: General;  Laterality: Left;   • BREAST RECONSTRUCTION Bilateral 04/27/2022    Procedure: BREAST RECONSTRUCTION REVISION  with bilateral skin resection, bilateral nipple reconstruction.;  Surgeon: Neena Piers MD;  Location: [facility] OR [facility];  Service: Plastics;  Laterality: Bilateral;   • BREAST RECONSTRUCTION Bilateral 12/06/2022    Procedure: BREAST RECONSTRUCTION REVISION;  Surgeon: Neena Pires MD;  Location: [facility] OR [facility];  Service: Plastics;  Laterality: Bilateral;   • BREAST TISSUE EXPANDER REMOVAL INSERTION OF IMPLANT Right 1/4/2023    Procedure: BREAST IMPLANT REMOVAL;  Surgeon: Jordan Pandey MD;  Location: [facility] OR [facility];  Service: Plastics;  Laterality: Right;   • BUNIONECTOMY Left    • COLONOSCOPY      aprrox 2018    • COLONOSCOPY N/A 08/31/2021    Procedure: COLONOSCOPY WITH INJECTION ORISE/POLYPECTOMY HOT SNARE/ENDO CLIP X3;  Surgeon: David Hernandez MD;  Location: [facility] ENDOSCOPY;  Service: Gastroenterology;  Laterality: N/A;  COLON POLYP   • CYSTOSCOPY     • ENDOSCOPY     • ENDOSCOPY N/A 08/31/2021    Procedure: ESOPHAGOGASTRODUODENOSCOPY WITH BIOPSY;  Surgeon: David Hernandez MD;  Location: [facility] ENDOSCOPY;  Service: Gastroenterology;  Laterality: N/A;  GASTRIC POLYPS/REFLUX ESOPHAGITIS   • LAPAROSCOPIC CHOLECYSTECTOMY     • MASTECTOMY Bilateral 12/07/2021    Procedure: MASTECTOMY;  Surgeon: Ayah Chaparro MD;  Location: [facility] OR [facility];  Service: General;  Laterality: Bilateral;   • ROTATOR CUFF REPAIR Right          Visit Dx:     ICD-10-CM ICD-9-CM   1. History of mastectomy, bilateral  Z90.13 V45.71   2. Deformity due to mastectomy  N64.89 611.89    Z90.10    3. Fitting and adjustment of unspecified prosthetic device  Z44.9 V52.9   4. At risk for lymphedema  Z91.89 V49.89        Patient History     Row Name 02/13/23 1000             History    Chief Complaint --  Breast prosthesis and bra  fitting  -TD      Brief Description of Current Complaint Fanta is a 62-year-old female that underwent a bilateral mastectomy on December 7, 2021 with multiple revisions after a breast cancer diagnosis.  -TD            User Key  (r) = Recorded By, (t) = Taken By, (c) = Cosigned By    Initials Name Provider Type    Julia Peace OT Occupational Therapist                       [facility]     Row Name 02/13/23 1100             Orthotics & Prosthetics Management    Orthosis Location other (see comments)  brast prothesis and mastectomy bra orthosis  -TD      Additional Documentation Orthosis Location (Row)  -TD            User Key  (r) = Recorded By, (t) = Taken By, (c) = Cosigned By    Initials Name Provider Type    Julia Peaec OT Occupational Therapist               OT Mastectomy Care:   Location: Right chest wall    Type of Prosthetic:  Silicone breast form    Reason for Visit/Customer Goal:  Patient would like to achieve symmetry and balance in appearance to improve orthopedic balance as well as improve psychological impact when engaging in community and social activities.    Reason for Prosthetic: Prevent Deformities    Date of Surgery: 12/2021    Date of Discharge: 12/2021    Wearing Schedule: As Tolerated    Prosthetic Site Condition: Intact    Tolerance: Tolerates Well    Product :  Amrita    Product Name and Model Number:  (issued: 2/13/2023) Adapt light 3A size 7R    Garments  Type of Garment Dispensed: Mast Bra w/o Breast Form    Breast : Amrita    Product Name:   2- Brittany 42 B Nude  1- [place] color: black size: 18    1- [place] color: grey size: 18   1- [place] color: nude size: 18   Number of Garments Dispensed: 5    Therapy Education  Education Details: Pt. was fitted with a adapt light 3A 7R silicone breast prosthesis. See below.  Fit and size are appropriate with no gapping, pinching, or puckering observed.  Pt. states comfort and satisfaction with both bra's  selected and with prosthesis.  All devices were checked for quality and safety.  Pt. was educated on the benefits, precautions warranty, care and maintenance for her prosthesis and bras.  Educational handout was provided in the prosthesis box.  Given: HEP, Symptoms/condition management  Program: New  How Provided: Verbal  Provided to: Patient  Level of Understanding: Verbalized         OT Goals     Row Name 02/13/23 1341          Time Calculation    OT Goal Re-Cert Due Date 03/15/23  -TD           User Key  (r) = Recorded By, (t) = Taken By, (c) = Cosigned By    Initials Name Provider Type    Julia Peace OT Occupational Therapist             GOALS:  1. Post Breast Surgery Care/at risk for Lymphedema              LTG 1b: 90 days:  As an indicator of no exacerbation of lymphedema staging, the patient will present with an L-Dex score less than 7 points from preoperative baseline.              STATUS: Met: ongoing              STG 1a:   30 days: To prevent exacerbation of mixed edema to lymphedema, patient will utilize the 2 postsurgical compression garments daily.                 STATUS: Met:  Discontinue  STG 1b: 30 days: Patient will be independent with self-manual lymphatic massage.               STATUS: Met: ongoing  STG 1c: 30 days:  Patient will be independent with identification of signs and symptoms of lymphedema exasperation per stoplight to recovery education handout.              STATUS: Met: ongoing  STG 1 d: 30 days: Patient will be independent with HEP to prevent advancement in lymphedema staging.              STATUS: Met: ongoing  TREATMENT:  Self Care/ADL retraining, Therapeutic Activity, Neuromuscular Re-education, Therapeutic Exercise, Bioimpedence Fluid Analysis, Post-Surgical compression garement [id number] [facility], Orthotic Management and training,  and Manual Therapy.  1. Encounter for Breast Prosthetic and mastectomy bra fitting:  LTG 1:  90 days: To provide balance and symmetry for  performance of daily activity, the patient will participate in breast prosthesis and mastectomy bra fitting procedure.   STATUS: New  STG 1a: 30 days:  Patient will participate in mastectomy bra fit re-evaluation to ensure proper fit for balance and symmetry for improved postural alignment/lymph fluid dynamics to prevent impairment in activities of daily living.   STATUS: New  STG 1b: 30 days:  Patient will participate in breast prosthesis fit re-evaluation 2 years after initial distribution to ensure proper fit for balance and symmetry for improved postural alignment/lymph fluid dynamics to prevent impairment in activities of daily living.  STATUS: New  TREATMENT: Orthotic/Prosthetic Management and Training, Amoena Silicone Breast Prosthetic, Mastectomy Bra initial fitting and 3 month re-fitting, ADL/Self Care, Therapeutic Activity, Therapeutic Exercise, and Manual Therapy.           OT Assessment/Plan     Row Name 02/13/23 1151          OT Assessment    Functional Limitations Performance in self-care ADL  -TD     Impairments Impaired lymphatic circulation  -TD     Assessment Comments Pt presents with decreased balance and symmetry from breast resection that impacts orthopedic balance and symmetry.  Patient will benefit from continued evaluation and of integrity of the silicone breast form as well as the mastectomy bras to ensure proper fit for symmetry and balance of breast prosthesis to reduce orthopedic and lymphatic impairment. The skills of a therapist will be required to safely and effectively implement the following treatment plan to restore maximal level of function.  -TD     OT Diagnosis history of mastectomy, deformity due to mastectomy  -TD     OT Rehab Potential Excellent  -TD     Patient/caregiver participated in establishment of treatment plan and goals Yes  -TD     Patient would benefit from skilled therapy intervention Yes  -TD        OT Plan    OT Frequency --  see duration  -TD     Predicted  Duration of Therapy Intervention (OT) Continue to reevaluate every 3 months years 1-3 and every 6 months years 4 and 5.  Pt will be seen 1 time a year for bra refill and every 2 years for breast prosthesis refill  -TD     Planned CPT's? OT RE-EVAL: 39238;OT NEUROMUSC RE EDUCATION EA 15 MIN: 17072;OT SELF CARE/MGMT/TRAIN 15 MIN: 06971;OT HOT/COLD PACK;OT ULTRASOUND EA 15 MIN: 22055;OT MANUAL THERAPY EA 15 MIN: 32963;OT BIS XTRACELL FLUID ANALYSIS: 27252;OT ORTHOTIC MGMT/TRAIN EA 15 MIN: 53074;OT ORTHO/PROSTHET CHECKOUT EA 15 MIN: 15308  -TD     Planned Therapy Interventions (Optional Details) prosthetic fitting/training;patient/family education;postural re-education;ROM (Range of Motion);orthotic fitting/training;manual therapy techniques;home exercise program;strengthening;stretching  -TD     OT Plan Comments cotinue with POC  -TD           User Key  (r) = Recorded By, (t) = Taken By, (c) = Cosigned By    Initials Name Provider Type    TD Julia Mac OT Occupational Therapist                          Time Calculation:   Untimed Charges  OT Eval/Re-eval Minutes: 90  Total Minutes  Untimed Charges Total Minutes: 90   Total Minutes: 90     Therapy Charges for Today     Code Description Service Date Service Provider Modifiers Qty    67752478238 HC OT EVAL LOW COMPLEXITY 4 2/13/2023 Julia Mac OT GO 1    28445565265 HC BREAST PROSTHESIS WO ADHS ENERGY/ADAPT AMOENA 2/13/2023 Julia Mac OT  1    82933823944 HC BRA POST/SURG NO/FORM ELIUD/SHO/PHYLILS/GILDARDO/DAVID 2/13/2023 Julia Mac OT  5                    Julia Mac OT  2/13/2023

## 2023-04-19 NOTE — H&P (VIEW-ONLY)
"Pre-op Exam (Pre Op Exam for 05/10/23)            History of Present Illness  Margy Jimenez is a 63 y.o. female who presents to [facility]IC [facility] RECONSTRU[facility] SURGERY for Pre-Operative Examination for Right chest tissue expander placement  Patient denies any swelling or drainage from the right breast.    Subjective        Hydromorphone, Hydromorphone hcl, and Cephalexin  Allergies Reconciled.    Review of Systems   All review of system has been reviewed and it  is negative except the ones note above.     Objective     /90 (BP Location: Right arm)   Pulse 77   Temp 98.4 °F (36.9 °C) (Temporal)   Ht 160 cm (63\")   Wt 92.6 kg (204 lb 3.2 oz)   SpO2 96%   BMI 36.17 kg/m²     Body mass index is 36.17 kg/m².    Physical Exam   Cardiovascular: Normal rate.     Pulmonary/Chest  Effort normal.   : Breast  Well-healed right mastectomy incision with appropriate skin laxity.  The patient has some mild tenderness in the upper chest, almost more of a hyperesthesia to light touch.  But there is no fluctuance.  No erythema or induration.  No streaking.    Result Review :                Assessment and Plan      Diagnoses and all orders for this visit:    1. Contour irregularity in reconstructed breast (Primary)    2. Pre-operative examination  -     Nicotine Screen, Urine - Urine, Clean Catch; Future    3. Infected breast tissue expander, subsequent encounter        Plan:  Delayed breast reconstruction, right breast with tissue expander  Use of biologic mesh    It was explained to the patient that the tissue expander be placed along with an AlloDerm sling.  However, if the intraoperative findings indicate that there is an adequate amount of skin, a permanent silicone prosthesis may be placed instead of the tissue expander.  Often there is some postoperative cicatrix requiring subsequent tissue expansion.  However it is impossible to make this decision accurately until the tissue is " examined intraoperatively.  Nicotine urine ordered; pt aware  Post op medication sent to pharmacy  Discussed will release and remove some scar tissue  Discussed will put mesh and tissue expander in  Discussed will go through same scars, no new scars  Discussed will have drain  Discussed time off     Device:  sientra high projection 700 cc ([id number])  Silicone sizer ([id number])  Sientra expander ([id number])  alloderm 20 x 16 cm      CPT code: 64606    Scribed by Tammy Porter, acting as a scribe for Jordan Pandey MD, 04/28/23 16:14 EDT.  Jordan Pandey MD's signature on the note affirms that the note adequately documents the care provided.      Follow Up     Return for post op f/u schd for 5/18/23.    Patient was given instructions and counseling regarding her condition. Please see specific information pulled into the AVS if appropriate.     Jordan Pandey MD  04/28/2023

## 2023-04-19 NOTE — PROGRESS NOTES
"Pre-op Exam (Pre Op Exam for 05/10/23)            History of Present Illness  Margy Jimenez is a 63 y.o. female who presents to [facility] PLASTIC [facility] RECONSTRUCTIVE SURGERY for Pre-Operative Examination for Right chest tissue expander placement  Patient denies any swelling or drainage from the right breast.    Subjective        Hydromorphone, Hydromorphone hcl, and Cephalexin  Allergies Reconciled.    Review of Systems   All review of system has been reviewed and it  is negative except the ones note above.     Objective     /90 (BP Location: Right arm)   Pulse 77   Temp 98.4 °F (36.9 °C) (Temporal)   Ht 160 cm (63\")   Wt 92.6 kg (204 lb 3.2 oz)   SpO2 96%   BMI 36.17 kg/m²     Body mass index is 36.17 kg/m².    Physical Exam   Cardiovascular: Normal rate.     Pulmonary/Chest  Effort normal.   : Breast  Well-healed right mastectomy incision with appropriate skin laxity.  The patient has some mild tenderness in the upper chest, almost more of a hyperesthesia to light touch.  But there is no fluctuance.  No erythema or induration.  No streaking.    Result Review :                Assessment and Plan      Diagnoses and all orders for this visit:    1. Contour irregularity in reconstructed breast (Primary)    2. Pre-operative examination  -     Nicotine Screen, Urine - Urine, Clean Catch; Future    3. Infected breast tissue expander, subsequent encounter        Plan:  Delayed breast reconstruction, right breast with tissue expander  Use of biologic mesh    It was explained to the patient that the tissue expander be placed along with an AlloDerm sling.  However, if the intraoperative findings indicate that there is an adequate amount of skin, a permanent silicone prosthesis may be placed instead of the tissue expander.  Often there is some postoperative cicatrix requiring subsequent tissue expansion.  However it is impossible to make this decision accurately until the tissue is " examined intraoperatively.  Nicotine urine ordered; pt aware  Post op medication sent to pharmacy  Discussed will release and remove some scar tissue  Discussed will put mesh and tissue expander in  Discussed will go through same scars, no new scars  Discussed will have drain  Discussed time off     Device:  sientra high projection 700 cc ([id number])  Silicone sizer ([id number])  Sientra expander ([id number])  alloderm 20 x 16 cm      CPT code: 99161    Scribed by Tammy Porter, acting as a scribe for Jordan Pandey MD, 04/28/23 16:14 EDT.  Jordan Pandey MD's signature on the note affirms that the note adequately documents the care provided.      Follow Up     Return for post op f/u schd for 5/18/23.    Patient was given instructions and counseling regarding her condition. Please see specific information pulled into the AVS if appropriate.     Jordan Pandey MD  04/28/2023

## 2023-04-21 ENCOUNTER — PREP FOR SURGERY (OUTPATIENT)
Dept: OTHER | Facility: HOSPITAL | Age: 63
End: 2023-04-21
Payer: COMMERCIAL

## 2023-04-21 DIAGNOSIS — Z17.0 MALIGNANT NEOPLASM OF LEFT BREAST IN FEMALE, ESTROGEN RECEPTOR POSITIVE, UNSPECIFIED SITE OF BREAST: Primary | ICD-10-CM

## 2023-04-21 DIAGNOSIS — C50.912 MALIGNANT NEOPLASM OF LEFT BREAST IN FEMALE, ESTROGEN RECEPTOR POSITIVE, UNSPECIFIED SITE OF BREAST: Primary | ICD-10-CM

## 2023-04-21 RX ORDER — CEFAZOLIN SODIUM 2 G/100ML
2 INJECTION, SOLUTION INTRAVENOUS ONCE
OUTPATIENT
Start: 2023-04-21 | End: 2023-04-21

## 2023-04-28 ENCOUNTER — OFFICE VISIT (OUTPATIENT)
Dept: PLASTIC SURGERY | Facility: CLINIC | Age: 63
End: 2023-04-28
Payer: COMMERCIAL

## 2023-04-28 VITALS
HEIGHT: 63 IN | BODY MASS INDEX: 36.18 KG/M2 | OXYGEN SATURATION: 96 % | TEMPERATURE: 98.4 F | DIASTOLIC BLOOD PRESSURE: 90 MMHG | WEIGHT: 204.2 LBS | SYSTOLIC BLOOD PRESSURE: 150 MMHG | HEART RATE: 77 BPM

## 2023-04-28 DIAGNOSIS — Z01.818 PRE-OPERATIVE EXAMINATION: ICD-10-CM

## 2023-04-28 DIAGNOSIS — T85.79XD: ICD-10-CM

## 2023-04-28 DIAGNOSIS — N65.0 CONTOUR IRREGULARITY IN RECONSTRUCTED BREAST: Primary | ICD-10-CM

## 2023-04-28 RX ORDER — CLINDAMYCIN HYDROCHLORIDE 150 MG/1
150 CAPSULE ORAL 4 TIMES DAILY
Qty: 40 CAPSULE | Refills: 0 | Status: SHIPPED | OUTPATIENT
Start: 2023-04-28 | End: 2023-05-08

## 2023-04-28 RX ORDER — PROMETHAZINE HYDROCHLORIDE 25 MG/1
25 TABLET ORAL EVERY 6 HOURS PRN
Qty: 20 TABLET | Refills: 0 | Status: SHIPPED | OUTPATIENT
Start: 2023-04-28

## 2023-04-28 RX ORDER — OXYCODONE HYDROCHLORIDE AND ACETAMINOPHEN 5; 325 MG/1; MG/1
1-2 TABLET ORAL EVERY 6 HOURS PRN
Qty: 28 TABLET | Refills: 0 | Status: SHIPPED | OUTPATIENT
Start: 2023-04-28

## 2023-05-01 ENCOUNTER — LAB (OUTPATIENT)
Dept: LAB | Facility: HOSPITAL | Age: 63
End: 2023-05-01
Payer: COMMERCIAL

## 2023-05-01 DIAGNOSIS — Z17.0 MALIGNANT NEOPLASM OF LEFT BREAST IN FEMALE, ESTROGEN RECEPTOR POSITIVE, UNSPECIFIED SITE OF BREAST: ICD-10-CM

## 2023-05-01 DIAGNOSIS — C50.912 MALIGNANT NEOPLASM OF LEFT BREAST IN FEMALE, ESTROGEN RECEPTOR POSITIVE, UNSPECIFIED SITE OF BREAST: ICD-10-CM

## 2023-05-01 DIAGNOSIS — Z01.818 PRE-OPERATIVE EXAMINATION: ICD-10-CM

## 2023-05-01 LAB — COTININE UR-MCNC: NEGATIVE NG/ML

## 2023-05-01 PROCEDURE — G0480 DRUG TEST DEF 1-7 CLASSES: HCPCS

## 2023-05-08 RX ORDER — BUSPIRONE HYDROCHLORIDE 10 MG/1
10 TABLET ORAL NIGHTLY
COMMUNITY

## 2023-05-08 NOTE — PRE-PROCEDURE INSTRUCTIONS
PATIENT INSTRUCTED TO BE:    - NPO AFTER MIDNIGHT EXCEPT CAN HAVE CLEAR LIQUIDS 2 HOURS PRIOR TO SURGERY ARRIVAL TIME     - TO HOLD ALL VITAMINS, SUPPLEMENTS, NSAIDS FOR ONE WEEK PRIOR TO THEIR SURGICAL PROCEDURE    - INSTRUCTED PT TO USE SURGICAL SOAP 1 TIME THE NIGHT PRIOR TO SURGERY OR THE AM OF SURGERY.   USE SOAP FROM NECK TO TOES AVOID THEIR FACE, HAIR, AND PRIVATE PARTS. INSTRUCTED NO LOTIONS, JEWELRY, PIERCINGS, OR DEODORANT DAY OF SURGERY    - IF DIABETIC, CHECK BLOOD GLUCOSE IF LESS THAN 70 CALL PREOP AREA -AM OF SURGERY     - INSTRUCTED TO TAKE THE FOLLOWING MEDICATIONS THE DAY OF SURGERY:       NONE    PATIENT VERBALIZED UNDERSTANDING

## 2023-05-08 NOTE — PROGRESS NOTES
"Chief Complaint  Post-op Follow-up (1 week follow up tissue expander placement right breast)    Subjective          History of Present Illness  Margy Jimenez is a 63 y.o. female who presents to [facility] PLASTIC [facility] RECONSTRUCTIVE SURGERY for Postoperative Follow-Up of breast tissue expander removal, insertion of implant 5/10/23.    Pt presents today for 1 week post op, very swollen and tender on right side.  Pain 4/10    Allergies: Hydromorphone, Hydromorphone hcl, and Cephalexin  Allergies Reconciled.    Review of Systems   All review of system has been reviewed and it  is negative except the ones note above.     Objective     /87 (BP Location: Right arm, Patient Position: Sitting)   Pulse 67   Temp 98.4 °F (36.9 °C) (Temporal)   Ht 160 cm (62.99\")   Wt 96.2 kg (212 lb)   SpO2 94%   BMI 37.56 kg/m²     Body mass index is 37.56 kg/m².    Physical Exam  Breast  Right breast with well-healed incision.  No significant discoloration or bruising.  Appropriate postoperative swelling.  The drainage is thin and serous.  Not bloody, not dark; such as a resolving hematoma.  Drain removed.  No overlying erythema or induration..    Result Review :                Assessment and Plan      Diagnoses and all orders for this visit:    1. Postoperative follow-up (Primary)        Plan:  • Drained was removed today in clinic  • Doing well.  Patient states she has a lot of tightness on the lateral aspect of the right breast.  No evidence of significant hematoma or seroma.  This may be appropriate (although undesirable) postoperative pain.        Follow Up     Return for RTC in 1 week.    Patient was given instructions and counseling regarding her condition. Please see specific information pulled into the AVS if appropriate.     Jordan Pandey MD  05/18/2023  "

## 2023-05-10 ENCOUNTER — ANESTHESIA EVENT (OUTPATIENT)
Dept: PERIOP | Facility: HOSPITAL | Age: 63
End: 2023-05-10
Payer: COMMERCIAL

## 2023-05-10 ENCOUNTER — ANESTHESIA (OUTPATIENT)
Dept: PERIOP | Facility: HOSPITAL | Age: 63
End: 2023-05-10
Payer: COMMERCIAL

## 2023-05-10 ENCOUNTER — HOSPITAL ENCOUNTER (OUTPATIENT)
Facility: HOSPITAL | Age: 63
Setting detail: HOSPITAL OUTPATIENT SURGERY
Discharge: HOME | End: 2023-05-10
Attending: SURGERY | Admitting: SURGERY
Payer: COMMERCIAL

## 2023-05-10 VITALS
HEART RATE: 82 BPM | BODY MASS INDEX: 35.86 KG/M2 | SYSTOLIC BLOOD PRESSURE: 105 MMHG | OXYGEN SATURATION: 92 % | HEIGHT: 63 IN | DIASTOLIC BLOOD PRESSURE: 65 MMHG | RESPIRATION RATE: 16 BRPM | WEIGHT: 202.38 LBS | TEMPERATURE: 97.8 F

## 2023-05-10 DIAGNOSIS — C50.912 MALIGNANT NEOPLASM OF LEFT BREAST IN FEMALE, ESTROGEN RECEPTOR POSITIVE, UNSPECIFIED SITE OF BREAST: ICD-10-CM

## 2023-05-10 DIAGNOSIS — Z17.0 MALIGNANT NEOPLASM OF LEFT BREAST IN FEMALE, ESTROGEN RECEPTOR POSITIVE, UNSPECIFIED SITE OF BREAST: ICD-10-CM

## 2023-05-10 LAB
ANION GAP SERPL CALCULATED.3IONS-SCNC: 10.4 MMOL/L (ref 5–15)
BUN SERPL-MCNC: 16 MG/DL (ref 8–23)
BUN/CREAT SERPL: 17.4 (ref 7–25)
CALCIUM SPEC-SCNC: 9.5 MG/DL (ref 8.6–10.5)
CHLORIDE SERPL-SCNC: 102 MMOL/L (ref 98–107)
CO2 SERPL-SCNC: 27.6 MMOL/L (ref 22–29)
CREAT SERPL-MCNC: 0.92 MG/DL (ref 0.57–1)
EGFRCR SERPLBLD CKD-EPI 2021: 69.7 ML/MIN/1.73
GLUCOSE SERPL-MCNC: 102 MG/DL (ref 65–99)
POTASSIUM SERPL-SCNC: 3.7 MMOL/L (ref 3.5–5.2)
QT INTERVAL: 444 MS
SODIUM SERPL-SCNC: 140 MMOL/L (ref 136–145)

## 2023-05-10 PROCEDURE — 88302 TISSUE EXAM BY PATHOLOGIST: CPT | Performed by: SURGERY

## 2023-05-10 PROCEDURE — C1789 PROSTHESIS, BREAST, IMP: HCPCS | Performed by: SURGERY

## 2023-05-10 PROCEDURE — 25010000002 DEXAMETHASONE PER 1 MG: Performed by: NURSE ANESTHETIST, CERTIFIED REGISTERED

## 2023-05-10 PROCEDURE — 80048 BASIC METABOLIC PNL TOTAL CA: CPT | Performed by: ANESTHESIOLOGY

## 2023-05-10 PROCEDURE — 19357 TISS XPNDR PLMT BRST RCNSTJ: CPT | Performed by: SURGERY

## 2023-05-10 PROCEDURE — 93005 ELECTROCARDIOGRAM TRACING: CPT | Performed by: ANESTHESIOLOGY

## 2023-05-10 PROCEDURE — 25010000002 FENTANYL CITRATE (PF) 50 MCG/ML SOLUTION: Performed by: NURSE ANESTHETIST, CERTIFIED REGISTERED

## 2023-05-10 PROCEDURE — 0 MEPERIDINE PER 100 MG: Performed by: NURSE ANESTHETIST, CERTIFIED REGISTERED

## 2023-05-10 PROCEDURE — 25010000002 FENTANYL CITRATE (PF) 50 MCG/ML SOLUTION: Performed by: ANESTHESIOLOGY

## 2023-05-10 PROCEDURE — 25010000002 ONDANSETRON PER 1 MG: Performed by: NURSE ANESTHETIST, CERTIFIED REGISTERED

## 2023-05-10 PROCEDURE — 25010000002 SUGAMMADEX 200 MG/2ML SOLUTION: Performed by: NURSE ANESTHETIST, CERTIFIED REGISTERED

## 2023-05-10 PROCEDURE — 15777 ACELLULAR DERM MATRIX IMPLT: CPT | Performed by: SURGERY

## 2023-05-10 PROCEDURE — 25010000002 MIDAZOLAM PER 1MG: Performed by: ANESTHESIOLOGY

## 2023-05-10 PROCEDURE — 25010000002 PROPOFOL 10 MG/ML EMULSION: Performed by: NURSE ANESTHETIST, CERTIFIED REGISTERED

## 2023-05-10 DEVICE — EXPNDR TISS BRST MODHT V/P STL 133S MV/T 500CC: Type: IMPLANTABLE DEVICE | Site: BREAST | Status: FUNCTIONAL

## 2023-05-10 DEVICE — GRFT TISS ALLODERM RTM PERF 16X20CM 2.4MM/THK .4MM: Type: IMPLANTABLE DEVICE | Site: BREAST | Status: FUNCTIONAL

## 2023-05-10 RX ORDER — MEPERIDINE HYDROCHLORIDE 25 MG/ML
12.5 INJECTION INTRAMUSCULAR; INTRAVENOUS; SUBCUTANEOUS
Status: DISCONTINUED | OUTPATIENT
Start: 2023-05-10 | End: 2023-05-10 | Stop reason: HOSPADM

## 2023-05-10 RX ORDER — ONDANSETRON 2 MG/ML
4 INJECTION INTRAMUSCULAR; INTRAVENOUS ONCE AS NEEDED
Status: DISCONTINUED | OUTPATIENT
Start: 2023-05-10 | End: 2023-05-10 | Stop reason: HOSPADM

## 2023-05-10 RX ORDER — ACETAMINOPHEN 500 MG
1000 TABLET ORAL ONCE
Status: COMPLETED | OUTPATIENT
Start: 2023-05-10 | End: 2023-05-10

## 2023-05-10 RX ORDER — DEXAMETHASONE SODIUM PHOSPHATE 4 MG/ML
INJECTION, SOLUTION INTRA-ARTICULAR; INTRALESIONAL; INTRAMUSCULAR; INTRAVENOUS; SOFT TISSUE AS NEEDED
Status: DISCONTINUED | OUTPATIENT
Start: 2023-05-10 | End: 2023-05-10 | Stop reason: SURG

## 2023-05-10 RX ORDER — PROMETHAZINE HYDROCHLORIDE 12.5 MG/1
25 TABLET ORAL ONCE AS NEEDED
Status: DISCONTINUED | OUTPATIENT
Start: 2023-05-10 | End: 2023-05-10 | Stop reason: HOSPADM

## 2023-05-10 RX ORDER — ROCURONIUM BROMIDE 10 MG/ML
INJECTION, SOLUTION INTRAVENOUS AS NEEDED
Status: DISCONTINUED | OUTPATIENT
Start: 2023-05-10 | End: 2023-05-10 | Stop reason: SURG

## 2023-05-10 RX ORDER — KETAMINE HCL IN NACL, ISO-OSM 100MG/10ML
SYRINGE (ML) INJECTION AS NEEDED
Status: DISCONTINUED | OUTPATIENT
Start: 2023-05-10 | End: 2023-05-10 | Stop reason: SURG

## 2023-05-10 RX ORDER — LIDOCAINE HYDROCHLORIDE 20 MG/ML
INJECTION, SOLUTION EPIDURAL; INFILTRATION; INTRACAUDAL; PERINEURAL AS NEEDED
Status: DISCONTINUED | OUTPATIENT
Start: 2023-05-10 | End: 2023-05-10 | Stop reason: SURG

## 2023-05-10 RX ORDER — FENTANYL CITRATE 50 UG/ML
INJECTION, SOLUTION INTRAMUSCULAR; INTRAVENOUS
Status: DISCONTINUED
Start: 2023-05-10 | End: 2023-05-10 | Stop reason: HOSPADM

## 2023-05-10 RX ORDER — OXYCODONE HYDROCHLORIDE 5 MG/1
5 TABLET ORAL
Status: COMPLETED | OUTPATIENT
Start: 2023-05-10 | End: 2023-05-10

## 2023-05-10 RX ORDER — EPHEDRINE SULFATE 50 MG/ML
INJECTION, SOLUTION INTRAVENOUS AS NEEDED
Status: DISCONTINUED | OUTPATIENT
Start: 2023-05-10 | End: 2023-05-10 | Stop reason: SURG

## 2023-05-10 RX ORDER — FENTANYL CITRATE 50 UG/ML
INJECTION, SOLUTION INTRAMUSCULAR; INTRAVENOUS AS NEEDED
Status: DISCONTINUED | OUTPATIENT
Start: 2023-05-10 | End: 2023-05-10 | Stop reason: SURG

## 2023-05-10 RX ORDER — PROMETHAZINE HYDROCHLORIDE 25 MG/1
25 SUPPOSITORY RECTAL ONCE AS NEEDED
Status: DISCONTINUED | OUTPATIENT
Start: 2023-05-10 | End: 2023-05-10 | Stop reason: HOSPADM

## 2023-05-10 RX ORDER — CLINDAMYCIN PHOSPHATE 900 MG/50ML
900 INJECTION INTRAVENOUS EVERY 8 HOURS SCHEDULED
Status: DISCONTINUED | OUTPATIENT
Start: 2023-05-10 | End: 2023-05-10

## 2023-05-10 RX ORDER — FENTANYL CITRATE 50 UG/ML
25 INJECTION, SOLUTION INTRAMUSCULAR; INTRAVENOUS
Status: DISCONTINUED | OUTPATIENT
Start: 2023-05-10 | End: 2023-05-10 | Stop reason: HOSPADM

## 2023-05-10 RX ORDER — CLINDAMYCIN PHOSPHATE 900 MG/50ML
900 INJECTION INTRAVENOUS
Status: COMPLETED | OUTPATIENT
Start: 2023-05-10 | End: 2023-05-10

## 2023-05-10 RX ORDER — CEFAZOLIN SODIUM 2 G/100ML
2 INJECTION, SOLUTION INTRAVENOUS ONCE
Status: DISCONTINUED | OUTPATIENT
Start: 2023-05-10 | End: 2023-05-10 | Stop reason: HOSPADM

## 2023-05-10 RX ORDER — MIDAZOLAM HYDROCHLORIDE 2 MG/2ML
2 INJECTION, SOLUTION INTRAMUSCULAR; INTRAVENOUS ONCE
Status: COMPLETED | OUTPATIENT
Start: 2023-05-10 | End: 2023-05-10

## 2023-05-10 RX ORDER — SODIUM CHLORIDE, SODIUM LACTATE, POTASSIUM CHLORIDE, CALCIUM CHLORIDE 600; 310; 30; 20 MG/100ML; MG/100ML; MG/100ML; MG/100ML
9 INJECTION, SOLUTION INTRAVENOUS CONTINUOUS PRN
Status: DISCONTINUED | OUTPATIENT
Start: 2023-05-10 | End: 2023-05-10 | Stop reason: HOSPADM

## 2023-05-10 RX ORDER — ONDANSETRON 2 MG/ML
INJECTION INTRAMUSCULAR; INTRAVENOUS AS NEEDED
Status: DISCONTINUED | OUTPATIENT
Start: 2023-05-10 | End: 2023-05-10 | Stop reason: SURG

## 2023-05-10 RX ORDER — PROPOFOL 10 MG/ML
VIAL (ML) INTRAVENOUS AS NEEDED
Status: DISCONTINUED | OUTPATIENT
Start: 2023-05-10 | End: 2023-05-10 | Stop reason: SURG

## 2023-05-10 RX ADMIN — PROPOFOL 150 MG: 10 INJECTION, EMULSION INTRAVENOUS at 10:49

## 2023-05-10 RX ADMIN — SUGAMMADEX 200 MG: 100 INJECTION, SOLUTION INTRAVENOUS at 12:21

## 2023-05-10 RX ADMIN — DEXAMETHASONE SODIUM PHOSPHATE 4 MG: 4 INJECTION, SOLUTION INTRA-ARTICULAR; INTRALESIONAL; INTRAMUSCULAR; INTRAVENOUS; SOFT TISSUE at 11:02

## 2023-05-10 RX ADMIN — EPHEDRINE SULFATE 25 MG: 50 INJECTION INTRAVENOUS at 11:10

## 2023-05-10 RX ADMIN — OXYCODONE HYDROCHLORIDE 5 MG: 5 TABLET ORAL at 13:17

## 2023-05-10 RX ADMIN — OXYCODONE HYDROCHLORIDE 5 MG: 5 TABLET ORAL at 13:02

## 2023-05-10 RX ADMIN — FENTANYL CITRATE 25 MCG: 50 INJECTION, SOLUTION INTRAMUSCULAR; INTRAVENOUS at 14:03

## 2023-05-10 RX ADMIN — PROPOFOL 50 MG: 10 INJECTION, EMULSION INTRAVENOUS at 12:35

## 2023-05-10 RX ADMIN — Medication 25 MG: at 10:58

## 2023-05-10 RX ADMIN — FENTANYL CITRATE 50 MCG: 50 INJECTION, SOLUTION INTRAMUSCULAR; INTRAVENOUS at 10:49

## 2023-05-10 RX ADMIN — EPHEDRINE SULFATE 25 MG: 50 INJECTION INTRAVENOUS at 11:15

## 2023-05-10 RX ADMIN — MIDAZOLAM HYDROCHLORIDE 2 MG: 1 INJECTION, SOLUTION INTRAMUSCULAR; INTRAVENOUS at 10:37

## 2023-05-10 RX ADMIN — FENTANYL CITRATE 50 MCG: 50 INJECTION, SOLUTION INTRAMUSCULAR; INTRAVENOUS at 10:57

## 2023-05-10 RX ADMIN — ACETAMINOPHEN 1000 MG: 500 TABLET ORAL at 09:01

## 2023-05-10 RX ADMIN — MEPERIDINE HYDROCHLORIDE 12.5 MG: 25 INJECTION INTRAMUSCULAR; INTRAVENOUS; SUBCUTANEOUS at 13:01

## 2023-05-10 RX ADMIN — ONDANSETRON 4 MG: 2 INJECTION INTRAMUSCULAR; INTRAVENOUS at 11:02

## 2023-05-10 RX ADMIN — Medication 25 MG: at 12:21

## 2023-05-10 RX ADMIN — SODIUM CHLORIDE, POTASSIUM CHLORIDE, SODIUM LACTATE AND CALCIUM CHLORIDE 9 ML/HR: 600; 310; 30; 20 INJECTION, SOLUTION INTRAVENOUS at 09:01

## 2023-05-10 RX ADMIN — CLINDAMYCIN IN 5 PERCENT DEXTROSE 900 MG: 18 INJECTION, SOLUTION INTRAVENOUS at 10:45

## 2023-05-10 RX ADMIN — LIDOCAINE HYDROCHLORIDE 100 MG: 20 INJECTION, SOLUTION EPIDURAL; INFILTRATION; INTRACAUDAL; PERINEURAL at 10:49

## 2023-05-10 RX ADMIN — ROCURONIUM BROMIDE 50 MG: 10 INJECTION, SOLUTION INTRAVENOUS at 10:49

## 2023-05-10 NOTE — ANESTHESIA POSTPROCEDURE EVALUATION
Patient: Margy Jimenez    Procedure Summary     Date: 05/10/23 Room / Location: [facility] OR  / [facility]    Anesthesia Start: 1045 Anesthesia Stop: 1255    Procedure: BREAST TISSUE EXPANDER  INSERTION WITH PLACEMENT OF MESH (Right: Breast) Diagnosis:       Malignant neoplasm of left breast in female, estrogen receptor positive, unspecified site of breast      (Malignant neoplasm of left breast in female, estrogen receptor positive, unspecified site of breast (HCC) [C50.912, Z17.0])    Surgeons: Jordan Pandey MD Provider: Reyes, Mirabelle, DO    Anesthesia Type: general ASA Status: 2          Anesthesia Type: general    Vitals  Vitals Value Taken Time   /87 05/10/23 1330   Temp 36.2 °C (97.2 °F) 05/10/23 1252   Pulse 91 05/10/23 1330   Resp 13 05/10/23 1252   SpO2 95 % 05/10/23 1330           Post Anesthesia Care and Evaluation    Patient location during evaluation: bedside  Patient participation: complete - patient participated  Level of consciousness: awake  Pain management: adequate    Airway patency: patent  PONV Status: none  Cardiovascular status: acceptable and stable  Respiratory status: acceptable  Hydration status: acceptable    Comments: An Anesthesiologist personally participated in the most demanding procedures (including induction and emergence if applicable) in the anesthesia plan, monitored the course of anesthesia administration at frequent intervals and remained physically present and available for immediate diagnosis and treatment of emergencies.

## 2023-05-10 NOTE — ANESTHESIA PREPROCEDURE EVALUATION
Anesthesia Evaluation     Patient summary reviewed and Nursing notes reviewed   no history of anesthetic complications:  NPO Solid Status: > 8 hours  NPO Liquid Status: > 2 hours           Airway   Mallampati: III  TM distance: >3 FB  Neck ROM: full  Possible difficult intubation  Dental - normal exam     Pulmonary - negative pulmonary ROS and normal exam    breath sounds clear to auscultation  Cardiovascular - normal exam  Exercise tolerance: good (4-7 METS)    Rhythm: regular  Rate: normal    (+) hypertension, hyperlipidemia,       Neuro/Psych  (+) headaches, psychiatric history Anxiety and Depression,    GI/Hepatic/Renal/Endo    (+) obesity,  GERD well controlled,      Musculoskeletal     (+) joint swelling,   Abdominal    Substance History - negative use     OB/GYN negative ob/gyn ROS         Other      history of cancer active    ROS/Med Hx Other: PAT Nursing Notes unavailable.                 Anesthesia Plan    ASA 2     general     (Patient understands anesthesia not responsible for dental damage.)  intravenous induction     Anesthetic plan, risks, benefits, and alternatives have been provided, discussed and informed consent has been obtained with: patient.  Pre-procedure education provided  Use of blood products discussed with patient  Consented to blood products.   Plan discussed with CRNA.        CODE STATUS:

## 2023-05-10 NOTE — DISCHARGE INSTRUCTIONS
DISCHARGE INSTRUCTIONS  SURGICAL / AMBULATORY  PROCEDURES      For your surgery you had:  General anesthesia (you may have a sore throat for the first 24 hours)  IV sedation.  Local anesthesia  Monitored anesthesia Care  YYou may experience dizziness, drowsiness, or light-headedness for several hours following surgery/procedure.  Do not stay alone today or tonight.  Limit your activity for 24 hours.  Resume your diet slowly.  Follow whatever special dietary instructions you may have been given by your doctor.  You should not drive or operate machinery, drink alcohol, or sign legally binding documents for 24 hours or while you are taking pain medication.  Last dose of pain medication was given at:   .  NOTIFY YOUR DOCTOR IF YOU EXPERIENCE ANY OF THE FOLLOWING:  Temperature greater than 101 degrees Fahrenheit  Shaking Chills  Redness or excessive drainage from incision  Nausea, vomiting and/or pain that is not controlled by prescribed medications  Increase in bleeding or bleeding that is excessive  Unable to urinate in 6 hours after surgery  If unable to reach your doctor, please go to the closest Emergency Room  You may begin dressing changes:     [] in 24 hours   [] in 48 hours   [x] Other:  *Leave skin glue alone*  You may shower or Friday 5/12/23  Medications per physician instructions as indicated on Discharge Medication Information Sheet.  You should see   for follow-up care   on   .  Phone number:       SPECIAL INSTRUCTIONS:                                        Ok for regular diet.  Do not drive for next 2 weeks or if taking pain medication.   Ok to shower Friday but be aware you are a fall risk so have someone with you or place a chair in the shower. It is ok to wet the breast, simply let the water/soap run over the area. No sponge or cloths.   Drain: Wash the drain site with water and liquid soap every day. No sponge or cloths. Wash the drain site before other parts of the body.  Strip drains 3-4  times a day and record drain output daily. Wash hands or wear gloves when manipulating drains.  Do not exercise for the next 2 weeks.  Sleep in an upright position.  No bra for 1 week. After that, wear a comfortable soft bra.  Ok to move arms slowly to the shoulder level (brushing hair movement).    Take post-operative medications as directed on the bottle.     If you experience any issues or concerns, please contact the office at [phone]. If after hours a call service will notify the on-call provider.

## 2023-05-10 NOTE — OP NOTE
Plastic Surgery Op Note    BREAST TISSUE EXPANDER REMOVAL INSERTION OF IMPLANT    Margy Jimenez  5/10/2023    Pre-op Diagnosis:   Malignant neoplasm of left breast in female, estrogen receptor positive, unspecified site of breast (HCC) [C50.912, Z17.0]    Post-Op Diagnosis Codes:     * Malignant neoplasm of left breast in female, estrogen receptor positive, unspecified site of breast [C50.912, Z17.0]    Anesthesia: General    Staff:   SURGEON: Dannie  Circulator: Adelia Doran RN  Scrub Person: Jacqueline Romero Mindy L  Assistant: Althea Horton RN CSA    Estimated Blood Loss: 50 mL    Specimens:   Order Name Source Comment Collection Info Order Time   BASIC METABOLIC PANEL   Collected By: Mary Christiansen 5/10/2023  8:04 AM   TISSUE PATHOLOGY EXAM Breast, Right  Collected By: Jordan Pandey MD 5/10/2023 11:41 AM     Release to patient   Routine Release              Drains:   [REMOVED] Closed/Suction Drain 1 Inferior;Right Breast Bulb 15 Fr. (Removed)       Findings:   Placement of tissue expander in a prepectoral plane under AlloDerm.  Initial fill of tissue expander was 400 cc  Tissue expander is Allergan reference number [id number].  Serial #[id number]  AlloDerm reference number [id number].  Lot number [id number]    Complications: None    Indications for procedure: Right breast cancer    Description of the procedure:  Patient is a 63-year-old white female who presents after developing an infection on the right side requiring the removal of the breast implant.  Patient has done well and has healed nicely.  She now presents for tissue expander placement.  It had been discussed with the patient the possibility of going straight to implant should there be an adequate amount of tissue laxity.  The risks and benefits of the procedure were discussed.  Her questions were answered.  She agreed to the procedure.  Patient was marked in the holding area.  She then brought back to the  opera placed in supine position.  After general anesthesia was induced, her chest was sterilely prepped and draped in the standard surgical fashion.  The right breast was infiltrated with a dilute Marcaine lidocaine epinephrine mix.  The mastectomy incision was reopened and this was then dissected superiorly and inferiorly.  Inferiorly down to the inframammary fold and superiorly to the upper pole of the breast.  There was some thickened scar tissue which was scored as well as some scar tissue that was removed.  This was submitted for pathologic examination.  The wound was then irrigated with Irrisept.  After hemostasis was obtained, the inferior edge of the AlloDerm was secured to the inframammary fold with a running horizontal mattress suture of 2-0 PDS.  The tissue expander was filled to 150 cc and then placed into the pocket and secured to tabs, the most inferior medial and the most inferior lateral tabs.  The AlloDerm was then placed over the device and secured along the medial aspect with a running 2-0 PDS suture.  Along the superior aspect with a running 2-0 PDS suture then continued onto the lateral border the pectoralis and down the lateral border the pectoralis to the inferolateral corner.  The wound was irrigated reexamined no bleeding was noted.  A drain was placed and brought out through a separate stab incision and secured with a drain stitch.  The port was identified under direct visualization as well as palpation and percutaneously accessed.  The inferior and superior mastectomy flaps were then reapproximated with a series of interrupted Vicryl's at the level of the deep tissue and interrupted Vicryl at the level of the dermis.  Not all the dermal sutures were placed and then through the percutaneous tubing, the tissue expander was filled to a total of 400 cc.  At this point it was felt that additional filler would create excessive tension and so no additional volume was placed into the expander.   The percutaneous needle was removed.  And then additional Vicryl's were placed at the level of the dermis and the skin was reapproximated with a running subcuticular Monocryl.  Patient tolerated this well at the end of the case was taken extubated to recovery in stable condition.    LA Rea, was present throughout the end entire case.  She assisted with exposure as well as hemostasis.  She assisted with skin closure.  Her assistance was essential to the efficient completion of this case.            .me  05/10/23  12:30 EDT

## 2023-05-10 NOTE — INTERVAL H&P NOTE
H&P reviewed. The patient was examined and there are no changes to the H&P.    The risks and benefits of the procedure were discussed with the patient.  The risk described included, but not limited to, bleeding, infection, need for revision surgeries, seroma, hematoma, poor cosmetic result, poor functional result, edge necrosis, edge separation, infection requiring removal of the device, deep venous thrombosis, pulmonary embolus, pneumonia, heart attack, stroke, death.  Her questions were answered. She still agrees to the procedure.  Patient marked in the holding area    She understands that the proposed surgery is placement of a tissue expander.  However, if there is sufficient soft tissue laxity after the removal of the scar tissue, placing a permanent silicone prosthesis was also discussed.  Patient is amenable to this plan.  By going directly to an implant, she can avoid a second surgery.

## 2023-05-18 ENCOUNTER — OFFICE VISIT (OUTPATIENT)
Dept: PLASTIC SURGERY | Facility: CLINIC | Age: 63
End: 2023-05-18
Payer: COMMERCIAL

## 2023-05-18 VITALS
OXYGEN SATURATION: 94 % | WEIGHT: 212 LBS | HEART RATE: 67 BPM | SYSTOLIC BLOOD PRESSURE: 150 MMHG | BODY MASS INDEX: 37.56 KG/M2 | HEIGHT: 63 IN | TEMPERATURE: 98.4 F | DIASTOLIC BLOOD PRESSURE: 87 MMHG

## 2023-05-18 DIAGNOSIS — Z09 POSTOPERATIVE FOLLOW-UP: Primary | ICD-10-CM

## 2023-05-19 LAB — QT INTERVAL: 444 MS

## 2023-05-24 NOTE — PROGRESS NOTES
"Chief Complaint  Post-op Follow-up (2 week post op)    Subjective          History of Present Illness  Margy Jimenez is a 63 y.o. female who presents to [facility] for Postoperative Follow-Up of breast tissue expander removal, insertion of implant 5/10/23.    Patient is here for 2wk post op follow up.  Right breast is tight, high, red and itchy.  Denies drainage or opening.   Denies fevers or chills.  Denies fevers.  No change in appetite.        Allergies: Hydromorphone, Hydromorphone hcl, and Cephalexin  Allergies Reconciled.    Review of Systems   All review of system has been reviewed and it  is negative except the ones note above.     Objective     /90 (BP Location: Right arm, Patient Position: Sitting)   Pulse 98   Temp 98.7 °F (37.1 °C) (Temporal)   Ht 160 cm (62.99\")   Wt 94.6 kg (208 lb 9.6 oz)   SpO2 98%   BMI 36.96 kg/m²     Body mass index is 36.96 kg/m².    Physical Exam    Breast:   Right breast has some postoperative edema and erythema.  No dehiscence.  No mottling or bruising.  No streaking.  Not very tender, not excessively tender.    Result Review :         Assessment and Plan    Patient with some changes worrisome for infection.  The patient recently finished a course of postoperative clindamycin.  She has a history of prior breast infections.  The patient will be started on a different antibiotic, doxycycline.    Should this be an infection requiring the removal of the implant and the mesh, the next steps were discussed with the patient.  When she heals, the patient is still a candidate for reconstruction both implant based or autologous.  However with 2 sets of infected implants after an adequate amount of time between the healing and the placement of the implant, the patient may be better served with an autologous tissue reconstruction.  TRAM flap versus latissimus dorsi flap was discussed with the patient.  A latissimus " dorsi flap with subsequent fat injections should provide an adequate amount of volume for reasonable symmetry.  She may require a procedure to the left breast in order to obtain better symmetry as well.  This is nothing that will be done in the short-term, but various options were discussed with the patient.    Diagnoses and all orders for this visit:    1. Postoperative follow-up (Primary)    Other orders  -     doxycycline (VIBRAMYCIN) 100 MG capsule; Take 1 capsule by mouth 2 (Two) Times a Day.  Dispense: 28 capsule; Refill: 0  -     fluconazole (Diflucan) 100 MG tablet; Take 1 tablet by mouth Daily for 3 days.  Dispense: 3 tablet; Refill: 0        Plan:  Begin Doxycycline and follow up in 1 week.      Follow Up     Return in about 1 week (around 6/1/2023) for post op follow up with Dannie.    Patient was given instructions and counseling regarding her condition. Please see specific information pulled into the AVS if appropriate.     Jordan Pandey MD  05/25/2023

## 2023-05-25 ENCOUNTER — OFFICE VISIT (OUTPATIENT)
Dept: PLASTIC SURGERY | Facility: CLINIC | Age: 63
End: 2023-05-25
Payer: COMMERCIAL

## 2023-05-25 VITALS
WEIGHT: 208.6 LBS | SYSTOLIC BLOOD PRESSURE: 150 MMHG | DIASTOLIC BLOOD PRESSURE: 90 MMHG | BODY MASS INDEX: 36.96 KG/M2 | HEIGHT: 63 IN | HEART RATE: 98 BPM | TEMPERATURE: 98.7 F | OXYGEN SATURATION: 98 %

## 2023-05-25 DIAGNOSIS — Z09 POSTOPERATIVE FOLLOW-UP: Primary | ICD-10-CM

## 2023-05-25 PROCEDURE — 99024 POSTOP FOLLOW-UP VISIT: CPT | Performed by: SURGERY

## 2023-05-25 RX ORDER — FLUCONAZOLE 100 MG/1
100 TABLET ORAL DAILY
Qty: 3 TABLET | Refills: 0 | Status: SHIPPED | OUTPATIENT
Start: 2023-05-25 | End: 2023-05-28

## 2023-05-25 RX ORDER — DOXYCYCLINE HYCLATE 100 MG/1
100 CAPSULE ORAL 2 TIMES DAILY
Qty: 28 CAPSULE | Refills: 0 | Status: SHIPPED | OUTPATIENT
Start: 2023-05-25

## 2023-05-27 ENCOUNTER — ANESTHESIA (OUTPATIENT)
Dept: PERIOP | Facility: HOSPITAL | Age: 63
End: 2023-05-27
Payer: COMMERCIAL

## 2023-05-27 ENCOUNTER — ANESTHESIA EVENT (OUTPATIENT)
Dept: PERIOP | Facility: HOSPITAL | Age: 63
End: 2023-05-27
Payer: COMMERCIAL

## 2023-05-27 ENCOUNTER — HOSPITAL ENCOUNTER (OUTPATIENT)
Facility: HOSPITAL | Age: 63
Setting detail: HOSPITAL OUTPATIENT SURGERY
Discharge: HOME | End: 2023-05-27
Attending: EMERGENCY MEDICINE | Admitting: SURGERY
Payer: COMMERCIAL

## 2023-05-27 ENCOUNTER — PREP FOR SURGERY (OUTPATIENT)
Dept: OTHER | Facility: HOSPITAL | Age: 63
End: 2023-05-27
Payer: COMMERCIAL

## 2023-05-27 VITALS
SYSTOLIC BLOOD PRESSURE: 152 MMHG | OXYGEN SATURATION: 93 % | HEIGHT: 63 IN | HEART RATE: 79 BPM | BODY MASS INDEX: 36.25 KG/M2 | RESPIRATION RATE: 16 BRPM | DIASTOLIC BLOOD PRESSURE: 76 MMHG | WEIGHT: 204.59 LBS | TEMPERATURE: 97.5 F

## 2023-05-27 DIAGNOSIS — N61.0 CELLULITIS OF RIGHT BREAST: Primary | ICD-10-CM

## 2023-05-27 DIAGNOSIS — T85.79XD: ICD-10-CM

## 2023-05-27 LAB
ALBUMIN SERPL-MCNC: 4.4 G/DL (ref 3.5–5.2)
ALBUMIN/GLOB SERPL: 1.2 G/DL
ALP SERPL-CCNC: 133 U/L (ref 39–117)
ALT SERPL W P-5'-P-CCNC: 20 U/L (ref 1–33)
ANION GAP SERPL CALCULATED.3IONS-SCNC: 10.8 MMOL/L (ref 5–15)
AST SERPL-CCNC: 19 U/L (ref 1–32)
BASOPHILS # BLD AUTO: 0.03 10*3/MM3 (ref 0–0.2)
BASOPHILS NFR BLD AUTO: 0.7 % (ref 0–1.5)
BILIRUB SERPL-MCNC: 0.5 MG/DL (ref 0–1.2)
BUN SERPL-MCNC: 17 MG/DL (ref 8–23)
BUN/CREAT SERPL: 18.9 (ref 7–25)
CALCIUM SPEC-SCNC: 9.8 MG/DL (ref 8.6–10.5)
CHLORIDE SERPL-SCNC: 101 MMOL/L (ref 98–107)
CO2 SERPL-SCNC: 28.2 MMOL/L (ref 22–29)
CREAT SERPL-MCNC: 0.9 MG/DL (ref 0.57–1)
D-LACTATE SERPL-SCNC: 1.1 MMOL/L (ref 0.5–2)
DEPRECATED RDW RBC AUTO: 45.7 FL (ref 37–54)
EGFRCR SERPLBLD CKD-EPI 2021: 71.5 ML/MIN/1.73
EOSINOPHIL # BLD AUTO: 0.14 10*3/MM3 (ref 0–0.4)
EOSINOPHIL NFR BLD AUTO: 3.2 % (ref 0.3–6.2)
ERYTHROCYTE [DISTWIDTH] IN BLOOD BY AUTOMATED COUNT: 13.5 % (ref 12.3–15.4)
GLOBULIN UR ELPH-MCNC: 3.6 GM/DL
GLUCOSE SERPL-MCNC: 103 MG/DL (ref 65–99)
HCT VFR BLD AUTO: 40.7 % (ref 34–46.6)
HGB BLD-MCNC: 13.5 G/DL (ref 12–15.9)
HOLD SPECIMEN: NORMAL
HOLD SPECIMEN: NORMAL
IMM GRANULOCYTES # BLD AUTO: 0.01 10*3/MM3 (ref 0–0.05)
IMM GRANULOCYTES NFR BLD AUTO: 0.2 % (ref 0–0.5)
LYMPHOCYTES # BLD AUTO: 0.91 10*3/MM3 (ref 0.7–3.1)
LYMPHOCYTES NFR BLD AUTO: 21.1 % (ref 19.6–45.3)
MCH RBC QN AUTO: 30.4 PG (ref 26.6–33)
MCHC RBC AUTO-ENTMCNC: 33.2 G/DL (ref 31.5–35.7)
MCV RBC AUTO: 91.7 FL (ref 79–97)
MONOCYTES # BLD AUTO: 0.31 10*3/MM3 (ref 0.1–0.9)
MONOCYTES NFR BLD AUTO: 7.2 % (ref 5–12)
NEUTROPHILS NFR BLD AUTO: 2.92 10*3/MM3 (ref 1.7–7)
NEUTROPHILS NFR BLD AUTO: 67.6 % (ref 42.7–76)
NRBC BLD AUTO-RTO: 0 /100 WBC (ref 0–0.2)
PLATELET # BLD AUTO: 288 10*3/MM3 (ref 140–450)
PMV BLD AUTO: 9.8 FL (ref 6–12)
POTASSIUM SERPL-SCNC: 3.6 MMOL/L (ref 3.5–5.2)
PROT SERPL-MCNC: 8 G/DL (ref 6–8.5)
RBC # BLD AUTO: 4.44 10*6/MM3 (ref 3.77–5.28)
SODIUM SERPL-SCNC: 140 MMOL/L (ref 136–145)
WBC NRBC COR # BLD: 4.32 10*3/MM3 (ref 3.4–10.8)
WHOLE BLOOD HOLD COAG: NORMAL
WHOLE BLOOD HOLD SPECIMEN: NORMAL

## 2023-05-27 PROCEDURE — 0 MEPERIDINE PER 100 MG: Performed by: NURSE ANESTHETIST, CERTIFIED REGISTERED

## 2023-05-27 PROCEDURE — 99284 EMERGENCY DEPT VISIT MOD MDM: CPT

## 2023-05-27 PROCEDURE — 25010000002 FENTANYL CITRATE (PF) 50 MCG/ML SOLUTION: Performed by: NURSE ANESTHETIST, CERTIFIED REGISTERED

## 2023-05-27 PROCEDURE — 80053 COMPREHEN METABOLIC PANEL: CPT | Performed by: EMERGENCY MEDICINE

## 2023-05-27 PROCEDURE — 19328 RMVL INTACT BREAST IMPLANT: CPT | Performed by: SURGERY

## 2023-05-27 PROCEDURE — 88302 TISSUE EXAM BY PATHOLOGIST: CPT | Performed by: SURGERY

## 2023-05-27 PROCEDURE — 99283 EMERGENCY DEPT VISIT LOW MDM: CPT | Performed by: SURGERY

## 2023-05-27 PROCEDURE — 25010000002 MIDAZOLAM PER 1MG: Performed by: ANESTHESIOLOGY

## 2023-05-27 PROCEDURE — 87205 SMEAR GRAM STAIN: CPT | Performed by: SURGERY

## 2023-05-27 PROCEDURE — 87186 SC STD MICRODIL/AGAR DIL: CPT | Performed by: SURGERY

## 2023-05-27 PROCEDURE — 87075 CULTR BACTERIA EXCEPT BLOOD: CPT | Performed by: SURGERY

## 2023-05-27 PROCEDURE — 25010000002 VANCOMYCIN 5 G RECONSTITUTED SOLUTION: Performed by: SURGERY

## 2023-05-27 PROCEDURE — 25010000002 PROPOFOL 10 MG/ML EMULSION: Performed by: NURSE ANESTHETIST, CERTIFIED REGISTERED

## 2023-05-27 PROCEDURE — 83605 ASSAY OF LACTIC ACID: CPT | Performed by: EMERGENCY MEDICINE

## 2023-05-27 PROCEDURE — 25010000002 METOCLOPRAMIDE PER 10 MG: Performed by: NURSE ANESTHETIST, CERTIFIED REGISTERED

## 2023-05-27 PROCEDURE — 85025 COMPLETE CBC W/AUTO DIFF WBC: CPT | Performed by: EMERGENCY MEDICINE

## 2023-05-27 PROCEDURE — 25010000002 SUGAMMADEX 200 MG/2ML SOLUTION: Performed by: NURSE ANESTHETIST, CERTIFIED REGISTERED

## 2023-05-27 PROCEDURE — 87040 BLOOD CULTURE FOR BACTERIA: CPT | Performed by: EMERGENCY MEDICINE

## 2023-05-27 PROCEDURE — 25010000002 ONDANSETRON PER 1 MG: Performed by: NURSE ANESTHETIST, CERTIFIED REGISTERED

## 2023-05-27 PROCEDURE — 87077 CULTURE AEROBIC IDENTIFY: CPT | Performed by: SURGERY

## 2023-05-27 PROCEDURE — 25010000002 DEXAMETHASONE PER 1 MG: Performed by: NURSE ANESTHETIST, CERTIFIED REGISTERED

## 2023-05-27 PROCEDURE — 87070 CULTURE OTHR SPECIMN AEROBIC: CPT | Performed by: SURGERY

## 2023-05-27 PROCEDURE — 36415 COLL VENOUS BLD VENIPUNCTURE: CPT

## 2023-05-27 RX ORDER — SODIUM CHLORIDE 0.9 % (FLUSH) 0.9 %
10 SYRINGE (ML) INJECTION AS NEEDED
Status: DISCONTINUED | OUTPATIENT
Start: 2023-05-27 | End: 2023-05-27 | Stop reason: HOSPADM

## 2023-05-27 RX ORDER — DEXMEDETOMIDINE HYDROCHLORIDE 100 UG/ML
INJECTION, SOLUTION INTRAVENOUS AS NEEDED
Status: DISCONTINUED | OUTPATIENT
Start: 2023-05-27 | End: 2023-05-27 | Stop reason: SURG

## 2023-05-27 RX ORDER — METOCLOPRAMIDE HYDROCHLORIDE 5 MG/ML
10 INJECTION INTRAMUSCULAR; INTRAVENOUS ONCE AS NEEDED
Status: DISCONTINUED | OUTPATIENT
Start: 2023-05-27 | End: 2023-05-27 | Stop reason: HOSPADM

## 2023-05-27 RX ORDER — PROPOFOL 10 MG/ML
VIAL (ML) INTRAVENOUS AS NEEDED
Status: DISCONTINUED | OUTPATIENT
Start: 2023-05-27 | End: 2023-05-27 | Stop reason: SURG

## 2023-05-27 RX ORDER — ACETAMINOPHEN 500 MG
1000 TABLET ORAL ONCE
Status: DISCONTINUED | OUTPATIENT
Start: 2023-05-27 | End: 2023-05-27 | Stop reason: HOSPADM

## 2023-05-27 RX ORDER — CETIRIZINE HYDROCHLORIDE 10 MG/1
10 TABLET ORAL DAILY
COMMUNITY
End: 2023-05-27 | Stop reason: HOSPADM

## 2023-05-27 RX ORDER — PROMETHAZINE HYDROCHLORIDE 12.5 MG/1
25 TABLET ORAL ONCE AS NEEDED
Status: DISCONTINUED | OUTPATIENT
Start: 2023-05-27 | End: 2023-05-27 | Stop reason: HOSPADM

## 2023-05-27 RX ORDER — DIPHENHYDRAMINE HCL 25 MG
50 CAPSULE ORAL NIGHTLY
COMMUNITY
End: 2023-05-27 | Stop reason: HOSPADM

## 2023-05-27 RX ORDER — SODIUM CHLORIDE, SODIUM LACTATE, POTASSIUM CHLORIDE, CALCIUM CHLORIDE 600; 310; 30; 20 MG/100ML; MG/100ML; MG/100ML; MG/100ML
INJECTION, SOLUTION INTRAVENOUS CONTINUOUS PRN
Status: DISCONTINUED | OUTPATIENT
Start: 2023-05-27 | End: 2023-05-27 | Stop reason: SURG

## 2023-05-27 RX ORDER — ROCURONIUM BROMIDE 10 MG/ML
INJECTION, SOLUTION INTRAVENOUS AS NEEDED
Status: DISCONTINUED | OUTPATIENT
Start: 2023-05-27 | End: 2023-05-27 | Stop reason: SURG

## 2023-05-27 RX ORDER — KETAMINE HCL IN NACL, ISO-OSM 100MG/10ML
SYRINGE (ML) INJECTION AS NEEDED
Status: DISCONTINUED | OUTPATIENT
Start: 2023-05-27 | End: 2023-05-27 | Stop reason: SURG

## 2023-05-27 RX ORDER — ONDANSETRON 2 MG/ML
INJECTION INTRAMUSCULAR; INTRAVENOUS AS NEEDED
Status: DISCONTINUED | OUTPATIENT
Start: 2023-05-27 | End: 2023-05-27 | Stop reason: SURG

## 2023-05-27 RX ORDER — SODIUM CHLORIDE, SODIUM LACTATE, POTASSIUM CHLORIDE, CALCIUM CHLORIDE 600; 310; 30; 20 MG/100ML; MG/100ML; MG/100ML; MG/100ML
9 INJECTION, SOLUTION INTRAVENOUS CONTINUOUS PRN
Status: DISCONTINUED | OUTPATIENT
Start: 2023-05-27 | End: 2023-05-27 | Stop reason: HOSPADM

## 2023-05-27 RX ORDER — OXYCODONE HYDROCHLORIDE 5 MG/1
5 TABLET ORAL
Status: DISCONTINUED | OUTPATIENT
Start: 2023-05-27 | End: 2023-05-27 | Stop reason: HOSPADM

## 2023-05-27 RX ORDER — MAGNESIUM HYDROXIDE 1200 MG/15ML
LIQUID ORAL AS NEEDED
Status: DISCONTINUED | OUTPATIENT
Start: 2023-05-27 | End: 2023-05-27 | Stop reason: HOSPADM

## 2023-05-27 RX ORDER — MIDAZOLAM HYDROCHLORIDE 2 MG/2ML
2 INJECTION, SOLUTION INTRAMUSCULAR; INTRAVENOUS ONCE
Status: COMPLETED | OUTPATIENT
Start: 2023-05-27 | End: 2023-05-27

## 2023-05-27 RX ORDER — MEPERIDINE HYDROCHLORIDE 25 MG/ML
12.5 INJECTION INTRAMUSCULAR; INTRAVENOUS; SUBCUTANEOUS
Status: COMPLETED | OUTPATIENT
Start: 2023-05-27 | End: 2023-05-27

## 2023-05-27 RX ORDER — ACETAMINOPHEN 650 MG
TABLET, EXTENDED RELEASE ORAL AS NEEDED
Status: DISCONTINUED | OUTPATIENT
Start: 2023-05-27 | End: 2023-05-27 | Stop reason: HOSPADM

## 2023-05-27 RX ORDER — LANOLIN ALCOHOL/MO/W.PET/CERES
3 CREAM (GRAM) TOPICAL NIGHTLY PRN
COMMUNITY
End: 2023-05-27 | Stop reason: HOSPADM

## 2023-05-27 RX ORDER — FAMOTIDINE 10 MG/ML
20 INJECTION, SOLUTION INTRAVENOUS ONCE
Status: COMPLETED | OUTPATIENT
Start: 2023-05-27 | End: 2023-05-27

## 2023-05-27 RX ORDER — FAMOTIDINE 20 MG/1
20 TABLET, FILM COATED ORAL 2 TIMES DAILY PRN
COMMUNITY
End: 2023-05-27 | Stop reason: HOSPADM

## 2023-05-27 RX ORDER — LIDOCAINE HYDROCHLORIDE 20 MG/ML
INJECTION, SOLUTION EPIDURAL; INFILTRATION; INTRACAUDAL; PERINEURAL AS NEEDED
Status: DISCONTINUED | OUTPATIENT
Start: 2023-05-27 | End: 2023-05-27 | Stop reason: SURG

## 2023-05-27 RX ORDER — EPHEDRINE SULFATE 50 MG/ML
INJECTION, SOLUTION INTRAVENOUS AS NEEDED
Status: DISCONTINUED | OUTPATIENT
Start: 2023-05-27 | End: 2023-05-27 | Stop reason: SURG

## 2023-05-27 RX ORDER — ONDANSETRON 2 MG/ML
4 INJECTION INTRAMUSCULAR; INTRAVENOUS ONCE AS NEEDED
Status: DISCONTINUED | OUTPATIENT
Start: 2023-05-27 | End: 2023-05-27 | Stop reason: HOSPADM

## 2023-05-27 RX ORDER — PROMETHAZINE HYDROCHLORIDE 25 MG/1
25 SUPPOSITORY RECTAL ONCE AS NEEDED
Status: DISCONTINUED | OUTPATIENT
Start: 2023-05-27 | End: 2023-05-27 | Stop reason: HOSPADM

## 2023-05-27 RX ORDER — FENTANYL CITRATE 50 UG/ML
INJECTION, SOLUTION INTRAMUSCULAR; INTRAVENOUS AS NEEDED
Status: DISCONTINUED | OUTPATIENT
Start: 2023-05-27 | End: 2023-05-27 | Stop reason: SURG

## 2023-05-27 RX ORDER — METOCLOPRAMIDE HYDROCHLORIDE 5 MG/ML
INJECTION INTRAMUSCULAR; INTRAVENOUS AS NEEDED
Status: DISCONTINUED | OUTPATIENT
Start: 2023-05-27 | End: 2023-05-27 | Stop reason: SURG

## 2023-05-27 RX ORDER — DEXAMETHASONE SODIUM PHOSPHATE 4 MG/ML
INJECTION, SOLUTION INTRA-ARTICULAR; INTRALESIONAL; INTRAMUSCULAR; INTRAVENOUS; SOFT TISSUE AS NEEDED
Status: DISCONTINUED | OUTPATIENT
Start: 2023-05-27 | End: 2023-05-27 | Stop reason: SURG

## 2023-05-27 RX ADMIN — MEPERIDINE HYDROCHLORIDE 12.5 MG: 25 INJECTION INTRAMUSCULAR; INTRAVENOUS; SUBCUTANEOUS at 13:17

## 2023-05-27 RX ADMIN — METOCLOPRAMIDE HYDROCHLORIDE 10 MG: 5 INJECTION INTRAMUSCULAR; INTRAVENOUS at 11:24

## 2023-05-27 RX ADMIN — SUGAMMADEX 100 MG: 100 INJECTION, SOLUTION INTRAVENOUS at 12:10

## 2023-05-27 RX ADMIN — FENTANYL CITRATE 50 MCG: 50 INJECTION, SOLUTION INTRAMUSCULAR; INTRAVENOUS at 11:23

## 2023-05-27 RX ADMIN — MEPERIDINE HYDROCHLORIDE 12.5 MG: 25 INJECTION INTRAMUSCULAR; INTRAVENOUS; SUBCUTANEOUS at 13:02

## 2023-05-27 RX ADMIN — MIDAZOLAM HYDROCHLORIDE 2 MG: 1 INJECTION, SOLUTION INTRAMUSCULAR; INTRAVENOUS at 11:06

## 2023-05-27 RX ADMIN — SUGAMMADEX 100 MG: 100 INJECTION, SOLUTION INTRAVENOUS at 12:13

## 2023-05-27 RX ADMIN — Medication 10 MG: at 12:18

## 2023-05-27 RX ADMIN — ROCURONIUM BROMIDE 50 MG: 10 INJECTION, SOLUTION INTRAVENOUS at 11:24

## 2023-05-27 RX ADMIN — ONDANSETRON 4 MG: 2 INJECTION INTRAMUSCULAR; INTRAVENOUS at 11:57

## 2023-05-27 RX ADMIN — DEXAMETHASONE SODIUM PHOSPHATE 4 MG: 4 INJECTION, SOLUTION INTRA-ARTICULAR; INTRALESIONAL; INTRAMUSCULAR; INTRAVENOUS; SOFT TISSUE at 11:25

## 2023-05-27 RX ADMIN — EPHEDRINE SULFATE 10 MG: 50 INJECTION INTRAVENOUS at 11:41

## 2023-05-27 RX ADMIN — FAMOTIDINE 20 MG: 10 INJECTION INTRAVENOUS at 11:06

## 2023-05-27 RX ADMIN — SODIUM CHLORIDE, POTASSIUM CHLORIDE, SODIUM LACTATE AND CALCIUM CHLORIDE: 600; 310; 30; 20 INJECTION, SOLUTION INTRAVENOUS at 11:07

## 2023-05-27 RX ADMIN — SODIUM CHLORIDE, SODIUM LACTATE, POTASSIUM CHLORIDE, CALCIUM CHLORIDE: 600; 310; 30; 20 INJECTION, SOLUTION INTRAVENOUS at 12:06

## 2023-05-27 RX ADMIN — DEXMEDETOMIDINE HYDROCHLORIDE 10 MCG: 100 INJECTION, SOLUTION, CONCENTRATE INTRAVENOUS at 12:05

## 2023-05-27 RX ADMIN — FENTANYL CITRATE 50 MCG: 50 INJECTION, SOLUTION INTRAMUSCULAR; INTRAVENOUS at 12:37

## 2023-05-27 RX ADMIN — LIDOCAINE HYDROCHLORIDE 100 MG: 20 INJECTION, SOLUTION EPIDURAL; INFILTRATION; INTRACAUDAL; PERINEURAL at 11:23

## 2023-05-27 RX ADMIN — FENTANYL CITRATE 50 MCG: 50 INJECTION, SOLUTION INTRAMUSCULAR; INTRAVENOUS at 12:19

## 2023-05-27 RX ADMIN — PROPOFOL 50 MG: 10 INJECTION, EMULSION INTRAVENOUS at 11:24

## 2023-05-27 RX ADMIN — EPHEDRINE SULFATE 10 MG: 50 INJECTION INTRAVENOUS at 11:38

## 2023-05-27 RX ADMIN — EPHEDRINE SULFATE 10 MG: 50 INJECTION INTRAVENOUS at 12:03

## 2023-05-27 RX ADMIN — Medication 1 G: at 11:05

## 2023-05-27 RX ADMIN — FENTANYL CITRATE 50 MCG: 50 INJECTION, SOLUTION INTRAMUSCULAR; INTRAVENOUS at 11:21

## 2023-05-27 RX ADMIN — PROPOFOL 150 MG: 10 INJECTION, EMULSION INTRAVENOUS at 11:23

## 2023-05-27 RX ADMIN — SODIUM CHLORIDE, POTASSIUM CHLORIDE, SODIUM LACTATE AND CALCIUM CHLORIDE: 600; 310; 30; 20 INJECTION, SOLUTION INTRAVENOUS at 12:21

## 2023-05-27 RX ADMIN — DEXMEDETOMIDINE HYDROCHLORIDE 10 MCG: 100 INJECTION, SOLUTION, CONCENTRATE INTRAVENOUS at 11:18

## 2023-05-27 NOTE — PLAN OF CARE
Goal Outcome Evaluation:                      Holding patient from PACU following incision and drainage of right breast.  Patient is alert and oriented x4.  Vital signs stable.  Patient stated pain 2/10.  Pain controlled at this time.  Patient able to eat and drink without nausea or vomiting.  Patient ambulated in room.  Patient voided an adequate amount of urine.  No bleeding.  Dressing clean, dry, and intact.  PUNEET drain draining appropriately.  10 mL of serosanguinous drainage noted at time of discharge in PUNEET drain.  Ivs removed with tips intact.  Patient given instructions on emptying PUNEET drain 4x per day.  Patient agreeable to discharge.  Full discharge instructions given.  Patient verbalized understanding of discharge instructions.

## 2023-05-27 NOTE — OP NOTE
Plastic Surgery Op Note    INCISION AND DRAINAGE TRUNK    Margy Jimenez  5/27/2023    Pre-op Diagnosis:   Infected breast tissue expander, subsequent encounter [T85.79XD]    Post-Op Diagnosis Codes:     * Infected breast tissue expander, subsequent encounter [T85.79XD]    Anesthesia: General    Staff:   SURGEON: Dannie  Circulator: Mariangel Gonzalez RN  Scrub Person: Lynnette Skelton; Alexis Xiong RN    Estimated Blood Loss: 25 mL    Specimens:   Order Name Source Comment Collection Info Order Time   ANAEROBIC CULTURE Breast, Right  Collected By: Jordan Pandey MD 5/27/2023 11:49 AM     Release to patient   Routine Release        WOUND CULTURE Breast, Right  Collected By: Jordan Pandey MD 5/27/2023 11:49 AM     Release to patient   Routine Release        TISSUE PATHOLOGY EXAM Breast, Right  Collected By: Jordan Pandey MD 5/27/2023 12:08 PM     Release to patient   Routine Release              Drains:   Closed/Suction Drain Right Breast Bulb 19 Fr. (Active)   Dressing Status Clean;Dry;Intact 05/27/23 1221   Status To bulb suction 05/27/23 1221       [REMOVED] Closed/Suction Drain 1 Inferior;Right Breast Bulb 15 Fr. (Removed)       [REMOVED] Closed/Suction Drain 1 Right Bulb 15 Fr. (Removed)   Dressing Status Clean;Dry;Intact 05/10/23 1440   Drainage Appearance Bloody 05/10/23 1440   Status To bulb suction 05/10/23 1440   Output (mL) 40 mL 05/10/23 1440       [REMOVED] NG/OG Tube Orogastric 18 Fr Center mouth (Removed)       Findings: Removal of implant and AlloDerm.  Cultured the cloudy serous fluid around the device    Complications: None    Indications for procedure: Right chest wall infection    Description of the procedure:  Patient is a 63-year-old white female with a history of breast cancer status postmastectomy and reconstruction who had an infected right implant that required removal and the patient underwent reconstruction approximately 2 weeks ago with a new  tissue expander and mesh.  She now presents with erythema and swelling and pain and spite of ongoing oral antibiotic usage.  Removal of the device and washout of the pocket was discussed with the patient.  The risks and benefits were described.  Her questions were answered.  She agreed to the procedure.  The patient was marked in the holding area.  She is then brought back to the operative placed in supine position.  After general anesthesia was induced, her chest was sterilely prepped and draped in standard surgical fashion.  After the appropriate timeout, began with a field block around the periphery of the breast with 20 cc of 1.5% lidocaine.  The mastectomy incision was reopened for distance of approximately 10 cm.  There was prompt return of some slightly cloudy serous fluid.  This was cultured.  The rest of it was aspirated out.  There was poor incorporation of the AlloDerm except on the lateralmost and at the superior medial aspect of the AlloDerm.  The AlloDerm that was in contact with the chest wall along the inferior aspect of the device was also densely adherent to an had evidence of early incorporation.  The fluid around the device, between the implant and the AlloDerm was more cloudy than the fluid on the outside of the AlloDerm.  This was a perforated AlloDerm to allow for better egress of the fluid.  Because of the turbid fluid and the progression of the cellulitis on antibiotics as well as her recent history of an infection, decision was made to remove the implant and the collagen mesh.  The PDS sutures were sharply divided and the mesh and implant were removed.  All the PDS suture was removed as were the sutures at the skin edges.  The wound was irrigated and reexamined.  Then a curette was used to rasp the fibrinous debris off the cavity.  The wound was irrigated again and hemostasis was obtained with the judicious use of electrocautery.  The pocket was washed with Betadine.  A drain was placed and  brought out through a separate stab incision and secured with a drain stitch.  The skin edges to the mastectomy pocket were then reapproximated with a running subcuticular 2-0 Monocryl.  At the end of the case sterile dressings were placed and she was taken extubated to recovery in stable condition.        .me  05/27/23  12:53 EDT

## 2023-05-27 NOTE — ED PROVIDER NOTES
Time: 9:32 AM EDT  Date of encounter:  5/27/2023  Independent Historian/Clinical History and Information was obtained by:   Patient  Chief Complaint: Breast pain redness and swelling    History is limited by: N/A    History of Present Illness:  Patient is a 63 y.o. year old female who presents to the emergency department for evaluation of right breast redness pain and swelling has been worse in the last 24 hours she has a known history of breast cancer status post mastectomies as well as implants and on the 10th of this month had an expander placed in the right breast and since that time has had progressive redness despite taking antibiotics.  Spoke to her surgeon who advised her to come to the emergency department today for further evaluation.  She had a low-grade fever at home.  She currently describes the pain as moderate.    HPI    Patient Care Team  Primary Care Provider: Mariangel Anne APRN    Past Medical History:     Allergies   Allergen Reactions   • Hydromorphone Anaphylaxis and Palpitations   • Hydromorphone Hcl Anaphylaxis and Palpitations   • Cephalexin Hives     Past Medical History:   Diagnosis Date   • Bladder atonia     LAZY BLADDER   • Cancer     LEFT BREAST. NO BP OR NEEDLE STICKS IN LEFT ARM.  DID NOT REQUIRE CHEMO OR RADIATION   • Depression    • GERD (gastroesophageal reflux disease)    • High blood pressure    • Hyperlipidemia    • Neoplasm of left breast    • S/P bilateral mastectomy 12/08/2021    NO BP OR NEEDLE STICKS LEFT ARM   • Status post bilateral breast reconstruction with implant placement 12/08/2021     Past Surgical History:   Procedure Laterality Date   • ABDOMINAL HYSTERECTOMY     • BREAST AUGMENTATION Bilateral 12/07/2021    Procedure: BILATERAL BREAST RECONSTRUCTION WITH IMPLANTS, IMPLANT OF BIOLOGICAL MESH,  USE OF SPY, AND BILATERAL CHEST LIPOSUCTION;  Surgeon: Neena Pires MD;  Location: [facility] OR [facility] [place];  Service: Plastics;  Laterality: Bilateral;   • BREAST  BIOPSY Left 10/26/2021    Procedure: LEFT BREAST LUMPECTOMY WITH SENTINEL NODE BIOPSY AND NEEDLE LOCALIZATION;  Surgeon: Ayah Chaparro MD;  Location: [facility] MAIN OR;  Service: General;  Laterality: Left;   • BREAST RECONSTRUCTION Bilateral 04/27/2022    Procedure: BREAST RECONSTRUCTION REVISION  with bilateral skin resection, bilateral nipple reconstruction.;  Surgeon: Neena Pires MD;  [place]: [facility] [place] [facility] [place]  Service: Plastics;  Laterality: Bilateral;   • BREAST RECONSTRUCTION Bilateral 12/06/2022    Procedure: BREAST RECONSTRUCTION REVISION;  Surgeon: Neena Pires MD;  Location: [facility] [place] [facility] [place];  Service: Plastics;  Laterality: Bilateral;   • BREAST TISSUE EXPANDER REMOVAL INSERTION OF IMPLANT Right 1/4/2023    Procedure: BREAST IMPLANT REMOVAL;  Surgeon: Jordan Pandey MD;  Location: [facility] OR [facility] [place];  Service: Plastics;  Laterality: Right;   • BREAST TISSUE EXPANDER REMOVAL INSERTION OF IMPLANT Right 5/10/2023    Procedure: BREAST TISSUE EXPANDER  INSERTION WITH PLACEMENT OF MESH;  Surgeon: Jordan Pandey MD;  Location: [facility] OR [facility] [place];  Service: Plastics;  Laterality: Right;   • BUNIONECTOMY Left    • COLONOSCOPY      aprrox 2018    • COLONOSCOPY N/A 08/31/2021    Procedure: COLONOSCOPY WITH INJECTION ORISE/POLYPECTOMY HOT SNARE/ENDO CLIP X3;  Surgeon: David Hernandez MD;  Location: [facility] ENDOSCOPY;  Service: Gastroenterology;  Laterality: N/A;  COLON POLYP   • CYSTOSCOPY     • ENDOSCOPY     • ENDOSCOPY N/A 08/31/2021    Procedure: ESOPHAGOGASTRODUODENOSCOPY WITH BIOPSY;  Surgeon: David Hernandez MD;  Location: [facility] ENDOSCOPY;  Service: Gastroenterology;  Laterality: N/A;  GASTRIC POLYPS/REFLUX ESOPHAGITIS   • LAPAROSCOPIC CHOLECYSTECTOMY     • MASTECTOMY Bilateral 12/07/2021    Procedure: MASTECTOMY;  Surgeon: Ayah Chaparro MD;  Location: [facility] OR [facility] [place];  Service: General;  Laterality: Bilateral;   • ROTATOR CUFF REPAIR Right      Family History    Problem Relation Age of Onset   • Diabetes Mother    • Diabetes Other    • Colon cancer Neg Hx    • Malig Hyperthermia Neg Hx        Home Medications:  Prior to Admission medications    Medication Sig Start Date End Date Taking? Authorizing Provider   amLODIPine (NORVASC) 2.5 MG tablet Take 1 tablet by mouth Every Night.    Holden Al MD   anastrozole (ARIMIDEX) 1 MG tablet TAKE 1 TABLET BY MOUTH EVERY DAY  Patient taking differently: Every Night. 11/29/22   Mariela Watson MD PhD   atorvastatin (LIPITOR) 10 MG tablet Take 1 tablet by mouth every night at bedtime. 9/11/21   Holden Al MD   baclofen (LIORESAL) 10 MG tablet Take 1 tablet by mouth Every Night.    Holden Al MD   busPIRone (BUSPAR) 10 MG tablet Take 1 tablet by mouth Every Night.    Holden Al MD   doxycycline (VIBRAMYCIN) 100 MG capsule Take 1 capsule by mouth 2 (Two) Times a Day. 5/25/23   Jordan Pandey MD   escitalopram (LEXAPRO) 20 MG tablet Take 1 tablet by mouth Every Night. 4/17/21   Holden Al MD   fluconazole (Diflucan) 100 MG tablet Take 1 tablet by mouth Daily for 3 days. 5/25/23 5/28/23  Jordan Pandey MD   hydrOXYzine (ATARAX) 25 MG tablet Take 1 tablet by mouth At Night As Needed. 9/8/22   Holden Al MD   lisinopril-hydrochlorothiazide (PRINZIDE,ZESTORETIC) 20-12.5 MG per tablet Take 1 tablet by mouth Every Night. 4/17/21   Holden Al MD   mirtazapine (REMERON) 15 MG tablet Take 1 tablet by mouth every night at bedtime. 4/17/21   Holden Al MD   pantoprazole (PROTONIX) 40 MG EC tablet Take 1 tablet by mouth 2 (Two) Times a Day As Needed. 2/2/22   Holden Al MD   promethazine (PHENERGAN) 25 MG tablet Take 1 tablet by mouth Every 6 (Six) Hours As Needed for Nausea or Vomiting. 4/28/23   Jordan Pandey MD   tamsulosin (FLOMAX) 0.4 MG capsule 24 hr capsule Take 1 capsule by mouth Every Night. 12/1/22    "Provider, MD Holden   oxyCODONE-acetaminophen (Percocet) 5-325 MG per tablet Take 1-2 tablets by mouth Every 6 (Six) Hours As Needed for Moderate Pain. 4/28/23 5/27/23  Jordan Pandey MD        Social History:   Social History     Tobacco Use   • Smoking status: Never     Passive exposure: Never   • Smokeless tobacco: Never   Vaping Use   • Vaping Use: Never used   Substance Use Topics   • Alcohol use: Yes     Alcohol/week: 1.0 standard drink     Types: 1 Glasses of wine per week     Comment: OCC   • Drug use: Never         Review of Systems:  Review of Systems   Constitutional: Positive for fever.        Physical Exam:  /76 (BP Location: Left leg, Patient Position: Lying)   Pulse 79   Temp 97.5 °F (36.4 °C) (Temporal)   Resp 16   Ht 160 cm (62.99\")   Wt 92.8 kg (204 lb 9.4 oz)   SpO2 93%   BMI 36.25 kg/m²     Physical Exam  Constitutional:       Appearance: Normal appearance.   HENT:      Head: Normocephalic and atraumatic.      Nose: Nose normal.      Mouth/Throat:      Mouth: Mucous membranes are moist.   Eyes:      Extraocular Movements: Extraocular movements intact.      Conjunctiva/sclera: Conjunctivae normal.      Pupils: Pupils are equal, round, and reactive to light.   Cardiovascular:      Rate and Rhythm: Normal rate and regular rhythm.      Pulses: Normal pulses.      Heart sounds: Normal heart sounds.   Pulmonary:      Effort: Pulmonary effort is normal.      Breath sounds: Normal breath sounds. No wheezing.   Abdominal:      Palpations: Abdomen is soft.      Tenderness: There is no abdominal tenderness.   Musculoskeletal:         General: Normal range of motion.      Cervical back: Normal range of motion and neck supple.      Right lower leg: No edema.      Left lower leg: No edema.      Comments: There is redness and swelling to the right breast area extending to the midline and crossing over to the medial aspect of the left breast area.  There is associated tenderness. "   Skin:     General: Skin is warm and dry.      Capillary Refill: Capillary refill takes less than 2 seconds.      Findings: No rash.   Neurological:      General: No focal deficit present.      Mental Status: She is alert and oriented to person, place, and time. Mental status is at baseline.      Cranial Nerves: No cranial nerve deficit.      Sensory: No sensory deficit.      Motor: No weakness.   Psychiatric:         Mood and Affect: Mood normal.         Behavior: Behavior normal.                  Procedures:  Procedures      Medical Decision Making:      Comorbidities that affect care:    None     External Notes reviewed:    None      The following orders were placed and all results were independently analyzed by me:  Orders Placed This Encounter   Procedures   • Blood Culture - Blood,   • Blood Culture - Blood,   • Anaerobic Culture - Wound, Breast, Right   • Wound Culture - Wound, Breast, Right   • Comprehensive Metabolic Panel   • Lactic Acid, Plasma   • [place] Draw   • CBC Auto Differential   • Undress & Gown   • Vital Signs   • Obtain Informed Consent   • Discharge Follow-up with Specified Provider: Dr. Pandey; 1 Week   • Drain Care & Output Recording   • Remove Dressing in 48 Hours   • IP General Consult (Use specialty-specific consult if known)   • Insert Peripheral IV   • Discontinue IV   • Discharge patient   • CBC & Differential   • Green Top (Gel)   • Lavender Top   • Gold Top - SST   • Light Blue Top       Medications Given in the Emergency Department:  Medications   sodium chloride 0.9 % flush 10 mL ( Intravenous MAR Hold 5/27/23 1058)   vancomycin 1000 mg/250 mL 0.9% NS IVPB ([facility]) (1 g Intravenous Given 5/27/23 1105)   Midazolam HCl (PF) (VERSED) injection 2 mg (2 mg Intravenous Given 5/27/23 1106)   famotidine (PEPCID) injection 20 mg (20 mg Intravenous Given 5/27/23 1106)   meperidine (DEMEROL) injection 12.5 mg (12.5 mg Intravenous Given 5/27/23 1317)        ED Course:         Labs:    Lab  Results (last 24 hours)     Procedure Component Value Units Date/Time    CBC & Differential [[id number]]  (Normal) Collected: 05/27/23 0910    Specimen: Blood Updated: 05/27/23 0926    Narrative:      The following orders were created for panel order CBC & Differential.  Procedure                               Abnormality         Status                     ---------                               -----------         ------                     CBC Auto Differential[[id number]]        Normal              Final result                 Please view results for these tests on the individual orders.    Comprehensive Metabolic Panel [[id number]]  (Abnormal) Collected: 05/27/23 0910    Specimen: Blood Updated: 05/27/23 0958     Glucose 103 mg/dL      BUN 17 mg/dL      Creatinine 0.90 mg/dL      Sodium 140 mmol/L      Potassium 3.6 mmol/L      Chloride 101 mmol/L      CO2 28.2 mmol/L      Calcium 9.8 mg/dL      Total Protein 8.0 g/dL      Albumin 4.4 g/dL      ALT (SGPT) 20 U/L      AST (SGOT) 19 U/L      Alkaline Phosphatase 133 U/L      Total Bilirubin 0.5 mg/dL      Globulin 3.6 gm/dL      A/G Ratio 1.2 g/dL      BUN/Creatinine Ratio 18.9     Anion Gap 10.8 mmol/L      eGFR 71.5 mL/min/1.73     Narrative:      GFR Normal >60  Chronic Kidney Disease <60  Kidney Failure <15      Lactic Acid, Plasma [[id number]]  (Normal) Collected: 05/27/23 0910    Specimen: Blood Updated: 05/27/23 0958     Lactate 1.1 mmol/L     Blood Culture - Blood, Arm, Left [[id number]] Collected: 05/27/23 0910    Specimen: Blood from Arm, Left Updated: 05/27/23 0922    Blood Culture - Blood, Arm, Left [[id number]] Collected: 05/27/23 0910    Specimen: Blood from Arm, Left Updated: 05/27/23 0922    CBC Auto Differential [[id number]]  (Normal) Collected: 05/27/23 0910    Specimen: Blood Updated: 05/27/23 0926     WBC 4.32 10*3/mm3      RBC 4.44 10*6/mm3      Hemoglobin 13.5 g/dL      Hematocrit 40.7 %      MCV 91.7 fL      MCH 30.4 pg      MCHC 33.2 g/dL      RDW  13.5 %      RDW-SD 45.7 fl      MPV 9.8 fL      Platelets 288 10*3/mm3      Neutrophil % 67.6 %      Lymphocyte % 21.1 %      Monocyte % 7.2 %      Eosinophil % 3.2 %      Basophil % 0.7 %      Immature Grans % 0.2 %      Neutrophils, Absolute 2.92 10*3/mm3      Lymphocytes, Absolute 0.91 10*3/mm3      Monocytes, Absolute 0.31 10*3/mm3      Eosinophils, Absolute 0.14 10*3/mm3      Basophils, Absolute 0.03 10*3/mm3      Immature Grans, Absolute 0.01 10*3/mm3      nRBC 0.0 /100 WBC     Anaerobic Culture - Wound, Breast, Right [[id number]] Collected: 05/27/23 1148    Specimen: Wound from Breast, Right Updated: 05/27/23 1203    Wound Culture - Wound, Breast, Right [[id number]] Collected: 05/27/23 1148    Specimen: Wound from Breast, Right Updated: 05/27/23 1253     Gram Stain No organisms seen           Imaging:    No Radiology Exams Resulted Within Past 24 Hours      Differential Diagnosis and Discussion:        All labs were reviewed and interpreted by me.    MDM  Number of Diagnoses or Management Options  Cellulitis of right breast  Diagnosis management comments: The patient presents with right breast increasing redness and swelling and low-grade fever.  She has been on antibiotics orally without improvement her surgeon has been consulted and is aware of this issue and is going to see the patient in the ER to provide definitive disposition.  I did discuss with him the option of ordering a CT scan however if he feels that that is not indicated with which I am in agreement.  She is stable and  Nonseptic,  nontoxic in appearance.           Patient Care Considerations:          Consultants/Shared Management Plan:    Consultant: I have discussed the case with Dannie who states He will see the patient in the emergency department    Social Determinants of Health:    Patient is independent, reliable, and has access to care.       Disposition and Care Coordination:    Admit:   Through independent evaluation of the patient's  history, physical, and imperical data, the patient meets criteria for observation/admission to the hospital.        Final diagnoses:   Cellulitis of right breast        ED Disposition     ED Disposition   Decision to Admit    Condition   --    Comment   --             This medical record created using voice recognition software.          Josiah Day MD  05/27/23 0936    Josiah Day MD  05/27/23 1539    Josiah Day MD  05/27/23 154

## 2023-05-27 NOTE — CONSULTS
"Plastic Surgery  Note    Current Date: 5/27/2023  Name: Margy Jimenez  MRN: [id number]  YOB: 1960  VS: /81 (BP Location: Right arm, Patient Position: Lying)   Pulse 66   Temp 98.2 °F (36.8 °C) (Oral)   Resp 18   Ht 160 cm (62.99\")   Wt 92.8 kg (204 lb 9.4 oz)   SpO2 95%   BMI 36.25 kg/m²   ?  ?  History of Present Illness:  Margy Jimenez is a female 63 y.o. who presents with increased erythema and swelling and discomfort to the right breast.  She has a complicated breast history.  She underwent bilateral mastectomies for breast cancer and implant-based reconstruction.  After her most recent breast surgery, she developed a right breast infection requiring removal of the device.  She has done well since then and then she presented for placement of a new tissue expander approximately 2 weeks ago.  Because of her history of prior infections, she was kept on clindamycin for a prolonged period postoperatively.  She was seen in clinic 2 days ago (Thursday) with some swelling and erythema and started on doxycycline.  Yesterday she called in the afternoon because the erythema and swelling of gotten worse.  However she had just finished eating dinner.  She was advised to not eat or drink anything after midnight and present to the emergency room this morning.  She did this.  She states that the swelling and erythema on the right breast is worse today than it was yesterday.    Family History:   Family History   Problem Relation Age of Onset   • Diabetes Mother    • Diabetes Other    • Colon cancer Neg Hx    • Malig Hyperthermia Neg Hx      Social History:   Social History     Socioeconomic History   • Marital status:    Tobacco Use   • Smoking status: Never     Passive exposure: Never   • Smokeless tobacco: Never   Vaping Use   • Vaping Use: Never used   Substance and Sexual Activity   • Alcohol use: Yes     Alcohol/week: 1.0 standard drink     Types: 1 Glasses of wine per " week     Comment: OCC   • Drug use: Never   • Sexual activity: Defer     Medical Problems: [unfilled]  Past Medical History:   Past Medical History:   Diagnosis Date   • Bladder atonia     LAZY BLADDER   • Cancer     LEFT BREAST. NO BP OR NEEDLE STICKS IN LEFT ARM.  DID NOT REQUIRE CHEMO OR RADIATION   • Depression    • GERD (gastroesophageal reflux disease)    • High blood pressure    • Hyperlipidemia    • Neoplasm of left breast    • S/P bilateral mastectomy 12/08/2021    NO BP OR NEEDLE STICKS LEFT ARM   • Status post bilateral breast reconstruction with implant placement 12/08/2021     Past Surgical History:   Past Surgical History:   Procedure Laterality Date   • ABDOMINAL HYSTERECTOMY     • BREAST AUGMENTATION Bilateral 12/07/2021    Procedure: BILATERAL BREAST RECONSTRUCTION WITH IMPLANTS, IMPLANT OF BIOLOGICAL MESH,  USE OF SPY, AND BILATERAL CHEST LIPOSUCTION;  Surgeon: Neena Pires MD;  [place] [facility] [place] [facility] [place]  Service: Plastics;  Laterality: Bilateral;   • BREAST BIOPSY Left 10/26/2021    Procedure: LEFT BREAST LUMPECTOMY WITH SENTINEL NODE BIOPSY AND NEEDLE LOCALIZATION;  Surgeon: Ayah Chaparro MD;  Location: [facility] MAIN OR;  Service: General;  Laterality: Left;   • BREAST RECONSTRUCTION Bilateral 04/27/2022    Procedure: BREAST RECONSTRUCTION REVISION  with bilateral skin resection, bilateral nipple reconstruction.;  Surgeon: Neena Pires MD;  [place] [facility] [place] [facility] [place]  Service: Plastics;  Laterality: Bilateral;   • BREAST RECONSTRUCTION Bilateral 12/06/2022    Procedure: BREAST RECONSTRUCTION REVISION;  Surgeon: Neena Pires MD;  Location: [facility] [place] [facility] [place]  Service: Plastics;  Laterality: Bilateral;   • BREAST TISSUE EXPANDER REMOVAL INSERTION OF IMPLANT Right 1/4/2023    Procedure: BREAST IMPLANT REMOVAL;  Surgeon: Jordan Pandey MD;  Location: [facility] [place] [facility] [place]  Service: Plastics;  Laterality: Right;   • BREAST TISSUE EXPANDER REMOVAL INSERTION OF IMPLANT  Right 5/10/2023    Procedure: BREAST TISSUE EXPANDER  INSERTION WITH PLACEMENT OF MESH;  Surgeon: Jordan Pandey MD;  Location: [facility] OR [facility] [place];  Service: Plastics;  Laterality: Right;   • BUNIONECTOMY Left    • COLONOSCOPY      aprrox 2018    • COLONOSCOPY N/A 08/31/2021    Procedure: COLONOSCOPY WITH INJECTION ORISE/POLYPECTOMY HOT SNARE/ENDO CLIP X3;  Surgeon: David Hernandez MD;  Location: [facility] ENDOSCOPY;  Service: Gastroenterology;  Laterality: N/A;  COLON POLYP   • CYSTOSCOPY     • ENDOSCOPY     • ENDOSCOPY N/A 08/31/2021    Procedure: ESOPHAGOGASTRODUODENOSCOPY WITH BIOPSY;  Surgeon: David Hernandez MD;  Location: [facility] ENDOSCOPY;  Service: Gastroenterology;  Laterality: N/A;  GASTRIC POLYPS/REFLUX ESOPHAGITIS   • LAPAROSCOPIC CHOLECYSTECTOMY     • MASTECTOMY Bilateral 12/07/2021    Procedure: MASTECTOMY;  Surgeon: Ayah Chaparro MD;  Location: [facility] OR [facility] [place];  Service: General;  Laterality: Bilateral;   • ROTATOR CUFF REPAIR Right      Medications:   Current Facility-Administered Medications:   •  sodium chloride 0.9 % flush 10 mL, 10 mL, Intravenous, PRN, Josiah Day MD  •  vancomycin 1000 mg/250 mL 0.9% NS IVPB ([facility]), 1 g, Intravenous, 60 Min Pre-Op, Jordan Pandey MD    Current Outpatient Medications:   •  amLODIPine (NORVASC) 2.5 MG tablet, Take 1 tablet by mouth Every Night., Disp: , Rfl:   •  anastrozole (ARIMIDEX) 1 MG tablet, TAKE 1 TABLET BY MOUTH EVERY DAY (Patient taking differently: Every Night.), Disp: 90 tablet, Rfl: 1  •  atorvastatin (LIPITOR) 10 MG tablet, Take 1 tablet by mouth every night at bedtime., Disp: , Rfl:   •  baclofen (LIORESAL) 10 MG tablet, Take 1 tablet by mouth Every Night., Disp: , Rfl:   •  busPIRone (BUSPAR) 10 MG tablet, Take 1 tablet by mouth Every Night., Disp: , Rfl:   •  doxycycline (VIBRAMYCIN) 100 MG capsule, Take 1 capsule by mouth 2 (Two) Times a Day., Disp: 28 capsule, Rfl: 0  •  escitalopram (LEXAPRO) 20 MG tablet, Take 1  "tablet by mouth Every Night., Disp: , Rfl:   •  fluconazole (Diflucan) 100 MG tablet, Take 1 tablet by mouth Daily for 3 days., Disp: 3 tablet, Rfl: 0  •  hydrOXYzine (ATARAX) 25 MG tablet, Take 1 tablet by mouth At Night As Needed., Disp: , Rfl:   •  lisinopril-hydrochlorothiazide (PRINZIDE,ZESTORETIC) 20-12.5 MG per tablet, Take 1 tablet by mouth Every Night., Disp: , Rfl:   •  mirtazapine (REMERON) 15 MG tablet, Take 1 tablet by mouth every night at bedtime., Disp: , Rfl:   •  pantoprazole (PROTONIX) 40 MG EC tablet, Take 1 tablet by mouth 2 (Two) Times a Day As Needed., Disp: , Rfl:   •  promethazine (PHENERGAN) 25 MG tablet, Take 1 tablet by mouth Every 6 (Six) Hours As Needed for Nausea or Vomiting., Disp: 20 tablet, Rfl: 0  •  tamsulosin (FLOMAX) 0.4 MG capsule 24 hr capsule, Take 1 capsule by mouth Every Night., Disp: , Rfl:   Allergies:   Allergies   Allergen Reactions   • Hydromorphone Anaphylaxis and Palpitations   • Hydromorphone Hcl Anaphylaxis and Palpitations   • Cephalexin Hives     ?  Review of Systems: All system were reviewed and were negative, except the ones noted above.   Physical Examination:   Blood pressure 138/81, pulse 66, temperature 98.2 °F (36.8 °C), temperature source Oral, resp. rate 18, height 160 cm (62.99\"), weight 92.8 kg (204 lb 9.4 oz), SpO2 95 %, not currently breastfeeding.  Physical examination demonstrates  General appearance: alert, cooperative, no distress, appears stated age  Head: Normocephalic, without obvious abnormality  Lungs: clear to auscultation bilaterally  Breasts: normal appearance, no masses or tenderness  Heart: regular rate and rhythm, S1, S2 normal, no murmur, click, rub or gallop  Breast: Left breast closed and clean and intact.  Right breast tender erythematous and swollen.  No dehiscence, no drainage.      Assessment and Plan:  Patient with right breast cellulitis after recent implant placement    Recommendation  Incision and drainage of right " breast    The urgent procedure was discussed with the patient.  We will reopen the existing mastectomy incision, remove the collagen mesh, remove the tissue expander and then washed out the pocket, debriding any necrotic material as necessary.  The intended plan is to close over a drain, but intraoperative findings may change this.  The risks and benefits of the procedure were discussed with the patient.  The risk described included, but not limited to, bleeding, infection, need for revision surgeries, seroma, hematoma, poor cosmetic result, poor functional result, edge necrosis, edge separation, deep venous thrombosis, pulmonary embolus, pneumonia, heart attack, stroke, death.  Her questions were answered. She agrees to the procedure.    Had a discussion with the patient regarding postoperative inpatient hospitalization.  The patient would like to go home.  It was stressed to the patient that she needs to return promptly with any worrisome changes.  She will be given vancomycin this hospitalization and she will continue her doxycycline.

## 2023-05-27 NOTE — ANESTHESIA PREPROCEDURE EVALUATION
Anesthesia Evaluation     Patient summary reviewed and Nursing notes reviewed   no history of anesthetic complications:  NPO Solid Status: > 8 hours  NPO Liquid Status: > 2 hours           Airway   Mallampati: III  TM distance: >3 FB  Neck ROM: full  No difficulty expected  Dental      Pulmonary - negative pulmonary ROS and normal exam    breath sounds clear to auscultation  Cardiovascular - normal exam  Exercise tolerance: good (4-7 METS)    Rhythm: regular  Rate: normal    (+) hypertension well controlled 2 medications or greater, hyperlipidemia,       Neuro/Psych  (+) headaches, psychiatric history,    GI/Hepatic/Renal/Endo    (+)  GERD,      Musculoskeletal     (+) joint swelling,   Abdominal    Substance History - negative use     OB/GYN negative ob/gyn ROS         Other      history of cancer remission    ROS/Med Hx Other: Left breast cancer status post mastectomy, with revision and implant                Anesthesia Plan    ASA 2 - emergent     general     (Patient understands anesthesia not responsible for dental damage.    Npo status good, took meds last night; does denote some acid in stomach will give pepcid/reglan and cricoid pressure during induction  )  intravenous induction     Anesthetic plan, risks, benefits, and alternatives have been provided, discussed and informed consent has been obtained with: patient.    Use of blood products discussed with patient .   Plan discussed with CRNA.        CODE STATUS:

## 2023-05-27 NOTE — ANESTHESIA POSTPROCEDURE EVALUATION
Patient: Margy Jimenez    Procedure Summary     Date: 05/27/23 Room / Location: [facility] OR 02 / [facility] MAIN OR    Anesthesia Start: 1117 Anesthesia Stop: 1240    Procedure: INCISION AND DRAINAGE OF RIGHT BREAST (Right: Breast) Diagnosis:       Infected breast tissue expander, subsequent encounter      (Infected breast tissue expander, subsequent encounter [T85.79XD])    Surgeons: Jordan Pandey MD Provider: Foster Lawrence MD    Anesthesia Type: general ASA Status: 2 - Emergent          Anesthesia Type: general    Vitals  Vitals Value Taken Time   /64 05/27/23 1335   Temp 36.4 °C (97.5 °F) 05/27/23 1335   Pulse 79 05/27/23 1335   Resp 16 05/27/23 1335   SpO2 94 % 05/27/23 1335           Post Anesthesia Care and Evaluation    Patient location during evaluation: bedside  Patient participation: complete - patient participated  Level of consciousness: awake  Pain score: 2  Pain management: adequate    Airway patency: patent  PONV Status: none  Cardiovascular status: acceptable and stable  Respiratory status: acceptable  Hydration status: acceptable    Comments: An Anesthesiologist personally participated in the most demanding procedures (including induction and emergence if applicable) in the anesthesia plan, monitored the course of anesthesia administration at frequent intervals and remained physically present and available for immediate diagnosis and treatment of emergencies.

## 2023-05-30 LAB
BACTERIA SPEC AEROBE CULT: ABNORMAL
GRAM STN SPEC: ABNORMAL

## 2023-05-31 NOTE — PROGRESS NOTES
"Chief Complaint  Post-op Follow-up (3wk post op follow up )    Subjective          History of Present Illness  Margy Jimenez is a 63 y.o. female who presents to [facility] for Postoperative Follow-Up of right breast tissue expander removal, insertion of implant 5/10/23.    Patient is here for 3wk post op follow up, but is also 3 days from removal. No new concerns. She does have a drain and states output has been around 10 for past few days.     5/27/23 wound culture-  Results-Rare Staphylococcus lugdunensis Abnormal   Currently taking doxy    Allergies: Hydromorphone, Hydromorphone hcl, and Cephalexin  Allergies Reconciled.    Review of Systems   All review of system has been reviewed and it  is negative except the ones note above.     Objective     /88 (BP Location: Right arm)   Pulse 74   Temp 98.2 °F (36.8 °C) (Temporal)   Ht 160 cm (63\")   Wt 94 kg (207 lb 3.2 oz)   SpO2 96%   BMI 36.70 kg/m²     Body mass index is 36.7 kg/m².    Physical Exam    Breast:   Right breast with well-healed mastectomy incision.  Drain in place.  Drainage is serous.  No overlying erythema or induration.  Nontender.  The mastectomy flaps appear to be adherent to the chest wall, no evidence of fluid collection.  No mottling or bruising.    Result Review :         Assessment and Plan          Diagnoses and all orders for this visit:    1. Postoperative follow-up (Primary)        Plan:    Drain will be removed on Monday-nurse visit  Advised pt to finish antibiotics due to recent culture  Patient doing well.    Wound cultures demonstrate that this organism was sensitive to the doxycycline which the patient was recently started on.  The patient states that the doxycycline is causing increasing amounts of GI upset.  Patient advised to stop the doxycycline if the GI upset continues to increase.  If it is tolerable, to continue it for a few more days.  Continue drain " care      Scribed by Tammy Porter, acting as a scribe for Jordan Pandey MD, 06/01/23 09:15 EDT.  Jordan Pandey MD's signature on the note affirms that the note adequately documents the care provided.    RTC on Monday for drain removal-nurse visit and appt week afterRTC on Monday for drain removal-nurse visit and appt week after    Patient was given instructions and counseling regarding her condition. Please see specific information pulled into the AVS if appropriate.     Jordan Pandey MD  06/01/2023

## 2023-06-01 ENCOUNTER — OFFICE VISIT (OUTPATIENT)
Dept: PLASTIC SURGERY | Facility: CLINIC | Age: 63
End: 2023-06-01

## 2023-06-01 VITALS
HEART RATE: 74 BPM | BODY MASS INDEX: 36.71 KG/M2 | HEIGHT: 63 IN | OXYGEN SATURATION: 96 % | TEMPERATURE: 98.2 F | DIASTOLIC BLOOD PRESSURE: 88 MMHG | SYSTOLIC BLOOD PRESSURE: 161 MMHG | WEIGHT: 207.2 LBS

## 2023-06-01 DIAGNOSIS — Z09 POSTOPERATIVE FOLLOW-UP: Primary | ICD-10-CM

## 2023-06-01 LAB
BACTERIA SPEC AEROBE CULT: NORMAL
BACTERIA SPEC AEROBE CULT: NORMAL
BACTERIA SPEC ANAEROBE CULT: NORMAL

## 2023-06-01 PROCEDURE — 99024 POSTOP FOLLOW-UP VISIT: CPT | Performed by: SURGERY

## 2023-06-05 ENCOUNTER — CLINICAL SUPPORT (OUTPATIENT)
Dept: PLASTIC SURGERY | Facility: CLINIC | Age: 63
End: 2023-06-05
Payer: COMMERCIAL

## 2023-06-05 NOTE — PROGRESS NOTES
"Chief Complaint  Post-op Follow-up (1 month post op)    Subjective          History of Present Illness  Margy Jimenez is a 63 y.o. female who presents to [facility] for Postoperative Follow-Up of right breast tissue expander removal, insertion of implant 5/10/23. INCISION AND DRAINAGE OF RIGHT BREAST 5/27/23    Patient is here for 1 month post op follow up.    Pt states doing well today with no issues        Allergies: Hydromorphone, Hydromorphone hcl, and Cephalexin  Allergies Reconciled.    Review of Systems   All review of system has been reviewed and it  is negative except the ones note above.     Objective     /75 (BP Location: Right arm, Patient Position: Sitting, Cuff Size: Large Adult)   Pulse 81   Temp 98.4 °F (36.9 °C) (Temporal)   Ht 160 cm (62.99\")   Wt 93.1 kg (205 lb 3.2 oz)   SpO2 95%   BMI 36.36 kg/m²     Body mass index is 36.36 kg/m².    Physical Exam   Cardiovascular: Normal rate.     Pulmonary/Chest  Effort normal.     Breast:   Right breast incision closed and clean.  A pullout suture was removed.  No erythema or induration.  No fluctuance.  No evidence of fluid collection or infection.    RResult Review :         Assessment and Plan    Spitting stitch removed today  No worrisome changes   Discussed future fat injections  Referred to Infectious Disease      Scribed by Tammy Porter, acting as a scribe for Jordan Pandey MD, 06/12/23 08:42 EDT.  Jordan Pandey MD's signature on the note affirms that the note adequately documents the care provided.      Diagnoses and all orders for this visit:    1. Infected breast tissue expander, subsequent encounter (Primary)  -     Ambulatory Referral to Infectious Disease    2. Postoperative follow-up          Plan:    Discussion with patient regarding her options for breast reconstruction.  The options discussed included; use of a padded bra with no reconstruction, " placement of a tissue expander and subsequent expansion with placement of an implant eventually, fat injections into the right mastectomy flaps, latissimus dorsi myocutaneous flap reconstruction (with or without implant), TRAM flap breast reconstruction.  Did not discuss in detail free flap breast reconstruction because I do not perform that option, the patient was informed that that is a possibility.  The patient states she is interested in reconstruction.  She is leaning towards another attempt at an implant-based reconstruction.  Because of her history of 2 infections after implant-based reconstructions, recommend a longer interval between surgeries to better sterilize the pocket as well as return her bacterial rosa maria to its normal type.  Also recommend the patient be evaluated by infectious diseases to see if additional interventions may be helpful in decreasing her risk for postoperative infection.      Patient was given instructions and counseling regarding her condition. Please see specific information pulled into the AVS if appropriate.     Jordan Pandey MD  06/12/2023

## 2023-06-12 ENCOUNTER — OFFICE VISIT (OUTPATIENT)
Dept: PLASTIC SURGERY | Facility: CLINIC | Age: 63
End: 2023-06-12
Payer: COMMERCIAL

## 2023-06-12 VITALS
HEIGHT: 63 IN | HEART RATE: 81 BPM | SYSTOLIC BLOOD PRESSURE: 112 MMHG | OXYGEN SATURATION: 95 % | TEMPERATURE: 98.4 F | WEIGHT: 205.2 LBS | BODY MASS INDEX: 36.36 KG/M2 | DIASTOLIC BLOOD PRESSURE: 75 MMHG

## 2023-06-12 DIAGNOSIS — Z09 POSTOPERATIVE FOLLOW-UP: ICD-10-CM

## 2023-06-12 DIAGNOSIS — T85.79XD: Primary | ICD-10-CM

## 2023-06-12 PROCEDURE — 99024 POSTOP FOLLOW-UP VISIT: CPT | Performed by: SURGERY

## 2023-07-06 PROBLEM — M19.90 ARTHRITIS: Status: ACTIVE | Noted: 2022-03-22

## 2023-07-06 PROBLEM — G47.00 INSOMNIA: Status: ACTIVE | Noted: 2022-09-08

## 2023-07-06 PROBLEM — N32.0 BLADDER OBSTRUCTION: Status: ACTIVE | Noted: 2023-03-09

## 2023-10-27 ENCOUNTER — TRANSCRIBE ORDERS (OUTPATIENT)
Dept: ADMINISTRATIVE | Facility: HOSPITAL | Age: 63
End: 2023-10-27
Payer: COMMERCIAL

## 2023-10-27 DIAGNOSIS — R22.2 MASS OF SUBCUTANEOUS TISSUE OF BACK: Primary | ICD-10-CM

## 2023-11-02 ENCOUNTER — HOSPITAL ENCOUNTER (OUTPATIENT)
Dept: ULTRASOUND IMAGING | Facility: HOSPITAL | Age: 63
Discharge: HOME | End: 2023-11-02
Admitting: NURSE PRACTITIONER
Payer: COMMERCIAL

## 2023-11-02 DIAGNOSIS — R22.2 MASS OF SUBCUTANEOUS TISSUE OF BACK: ICD-10-CM

## 2023-11-02 PROCEDURE — 76999 ECHO EXAMINATION PROCEDURE: CPT

## 2023-11-14 DIAGNOSIS — Z17.0 MALIGNANT NEOPLASM OF UPPER-OUTER QUADRANT OF LEFT BREAST IN FEMALE, ESTROGEN RECEPTOR POSITIVE: ICD-10-CM

## 2023-11-14 DIAGNOSIS — C50.412 MALIGNANT NEOPLASM OF UPPER-OUTER QUADRANT OF LEFT BREAST IN FEMALE, ESTROGEN RECEPTOR POSITIVE: ICD-10-CM

## 2023-11-14 RX ORDER — ANASTROZOLE 1 MG/1
1 TABLET ORAL DAILY
Qty: 90 TABLET | Refills: 0 | Status: SHIPPED | OUTPATIENT
Start: 2023-11-14

## 2023-12-06 ENCOUNTER — HOSPITAL ENCOUNTER (OUTPATIENT)
Dept: OCCUPATIONAL THERAPY | Facility: HOSPITAL | Age: 63
Setting detail: THERAPIES SERIES
Discharge: HOME | End: 2023-12-06
Payer: COMMERCIAL

## 2023-12-06 DIAGNOSIS — C50.412 MALIGNANT NEOPLASM OF UPPER-OUTER QUADRANT OF LEFT BREAST IN FEMALE, ESTROGEN RECEPTOR POSITIVE: ICD-10-CM

## 2023-12-06 DIAGNOSIS — I89.9 DISORDERS OF LYMPH NODE AND LYMPHATICS: ICD-10-CM

## 2023-12-06 DIAGNOSIS — Z91.89 AT RISK FOR LYMPHEDEMA: Primary | ICD-10-CM

## 2023-12-06 DIAGNOSIS — Z17.0 MALIGNANT NEOPLASM OF UPPER-OUTER QUADRANT OF LEFT BREAST IN FEMALE, ESTROGEN RECEPTOR POSITIVE: ICD-10-CM

## 2023-12-06 DIAGNOSIS — C50.912 MALIGNANT NEOPLASM OF LEFT BREAST IN FEMALE, ESTROGEN RECEPTOR POSITIVE, UNSPECIFIED SITE OF BREAST: ICD-10-CM

## 2023-12-06 DIAGNOSIS — Z90.13 HISTORY OF MASTECTOMY, BILATERAL: ICD-10-CM

## 2023-12-06 DIAGNOSIS — Z17.0 MALIGNANT NEOPLASM OF LEFT BREAST IN FEMALE, ESTROGEN RECEPTOR POSITIVE, UNSPECIFIED SITE OF BREAST: ICD-10-CM

## 2024-01-04 ENCOUNTER — HOSPITAL ENCOUNTER (OUTPATIENT)
Dept: OCCUPATIONAL THERAPY | Facility: HOSPITAL | Age: 64
Setting detail: THERAPIES SERIES
Discharge: HOME | End: 2024-01-04
Payer: COMMERCIAL

## 2024-01-04 ENCOUNTER — OFFICE VISIT (OUTPATIENT)
Dept: ONCOLOGY | Facility: HOSPITAL | Age: 64
End: 2024-01-04
Payer: COMMERCIAL

## 2024-01-04 VITALS
WEIGHT: 205.91 LBS | SYSTOLIC BLOOD PRESSURE: 126 MMHG | TEMPERATURE: 98 F | BODY MASS INDEX: 36.48 KG/M2 | DIASTOLIC BLOOD PRESSURE: 80 MMHG | RESPIRATION RATE: 16 BRPM | HEART RATE: 71 BPM | OXYGEN SATURATION: 96 %

## 2024-01-04 DIAGNOSIS — Z91.89 AT RISK FOR LYMPHEDEMA: Primary | ICD-10-CM

## 2024-01-04 DIAGNOSIS — C50.412 MALIGNANT NEOPLASM OF UPPER-OUTER QUADRANT OF LEFT BREAST IN FEMALE, ESTROGEN RECEPTOR POSITIVE: ICD-10-CM

## 2024-01-04 DIAGNOSIS — Z90.13 HISTORY OF MASTECTOMY, BILATERAL: ICD-10-CM

## 2024-01-04 DIAGNOSIS — C50.912 MALIGNANT NEOPLASM OF LEFT BREAST IN FEMALE, ESTROGEN RECEPTOR POSITIVE, UNSPECIFIED SITE OF BREAST: ICD-10-CM

## 2024-01-04 DIAGNOSIS — Z17.0 MALIGNANT NEOPLASM OF UPPER-OUTER QUADRANT OF LEFT BREAST IN FEMALE, ESTROGEN RECEPTOR POSITIVE: ICD-10-CM

## 2024-01-04 DIAGNOSIS — I89.9 DISORDERS OF LYMPH NODE AND LYMPHATICS: ICD-10-CM

## 2024-01-04 DIAGNOSIS — Z17.0 MALIGNANT NEOPLASM OF LEFT BREAST IN FEMALE, ESTROGEN RECEPTOR POSITIVE, UNSPECIFIED SITE OF BREAST: ICD-10-CM

## 2024-01-04 PROCEDURE — G0463 HOSPITAL OUTPT CLINIC VISIT: HCPCS | Performed by: NURSE PRACTITIONER

## 2024-01-04 PROCEDURE — 97535 SELF CARE MNGMENT TRAINING: CPT

## 2024-01-04 PROCEDURE — 93702 BIS XTRACELL FLUID ANALYSIS: CPT

## 2024-01-04 RX ORDER — ANASTROZOLE 1 MG/1
1 TABLET ORAL DAILY
Qty: 90 TABLET | Refills: 1 | Status: SHIPPED | OUTPATIENT
Start: 2024-01-04

## 2024-01-04 RX ORDER — CIPROFLOXACIN 500 MG/1
TABLET, FILM COATED ORAL
COMMUNITY
Start: 2023-12-26

## 2024-01-04 RX ORDER — ASPIRIN 81 MG/1
81 TABLET, CHEWABLE ORAL DAILY
COMMUNITY
Start: 2023-11-17 | End: 2024-02-16

## 2024-01-04 RX ORDER — CETIRIZINE HYDROCHLORIDE 10 MG/1
10 TABLET ORAL
COMMUNITY

## 2024-01-04 NOTE — THERAPY RE-EVALUATION
Outpatient Occupational Therapy Lymphedema Re-Evaluation   [facility]     Patient Name: Margy Jimenez  : 1960  MRN: [id number]  Today's Date: 2024      Visit Date: 2024    Patient Active Problem List   Diagnosis    High blood pressure    Mood disorder    Anxiety    Biceps tendinitis, right    Fatigue    GERD (gastroesophageal reflux disease)    Hyperlipidemia    Migraine    Nontraumatic incomplete tear of right rotator cuff    Pain and swelling of right shoulder    S/P rotator cuff repair    Shoulder impingement, right    Hypertension    Depression    Gastroesophageal reflux disease    Heartburn    Malignant neoplasm of left breast in female, estrogen receptor positive    S/P bilateral mastectomy    Status post bilateral breast reconstruction with implant placement    Postoperative follow-up    Contour irregularity in reconstructed breast    Breast asymmetry    Infected breast tissue expander, subsequent encounter    Arthritis    Bladder obstruction    Insomnia        Past Medical History:   Diagnosis Date    Bladder atonia     LAZY BLADDER    Cancer     LEFT BREAST. NO BP OR NEEDLE STICKS IN LEFT ARM.  DID NOT REQUIRE CHEMO OR RADIATION    Depression     GERD (gastroesophageal reflux disease)     High blood pressure     Hyperlipidemia     Neoplasm of left breast     S/P bilateral mastectomy 2021    NO BP OR NEEDLE STICKS LEFT ARM    Status post bilateral breast reconstruction with implant placement 2021        Past Surgical History:   Procedure Laterality Date    ABDOMINAL HYSTERECTOMY      BREAST AUGMENTATION Bilateral 2021    Procedure: BILATERAL BREAST RECONSTRUCTION WITH IMPLANTS, IMPLANT OF BIOLOGICAL MESH,  USE OF SPY, AND BILATERAL CHEST LIPOSUCTION;  Surgeon: Neena Pires MD;  Location: [facility] [place];  Service: Plastics;  Laterality: Bilateral;    BREAST BIOPSY Left 10/26/2021    Procedure: LEFT BREAST LUMPECTOMY WITH SENTINEL NODE BIOPSY AND NEEDLE  LOCALIZATION;  Surgeon: Ayah Chaparro MD;  Location: [facility] MAIN OR;  Service: General;  Laterality: Left;    BREAST RECONSTRUCTION Bilateral 04/27/2022    Procedure: BREAST RECONSTRUCTION REVISION  with bilateral skin resection, bilateral nipple reconstruction.;  Surgeon: Neena Pires MD;  Location: [facility] OR [facility];  Service: Plastics;  Laterality: Bilateral;    BREAST RECONSTRUCTION Bilateral 12/06/2022    Procedure: BREAST RECONSTRUCTION REVISION;  Surgeon: Neena Pires MD;  Location: [facility] [place] [facility];  Service: Plastics;  Laterality: Bilateral;    BREAST TISSUE EXPANDER REMOVAL INSERTION OF IMPLANT Right 1/4/2023    Procedure: BREAST IMPLANT REMOVAL;  Surgeon: Jordan Pandey MD;  Location: [facility] OR [facility] [place];  Service: Plastics;  Laterality: Right;    BREAST TISSUE EXPANDER REMOVAL INSERTION OF IMPLANT Right 5/10/2023    Procedure: BREAST TISSUE EXPANDER  INSERTION WITH PLACEMENT OF MESH;  Surgeon: Jordan Pandey MD;  Location: [facility] OR [facility];  Service: Plastics;  Laterality: Right;    BUNIONECTOMY Left     COLONOSCOPY      aprrox 2018     COLONOSCOPY N/A 08/31/2021    Procedure: COLONOSCOPY WITH INJECTION ORISE/POLYPECTOMY HOT SNARE/ENDO CLIP X3;  Surgeon: David Hernandez MD;  Location: [facility] ENDOSCOPY;  Service: Gastroenterology;  Laterality: N/A;  COLON POLYP    CYSTOSCOPY      ENDOSCOPY      ENDOSCOPY N/A 08/31/2021    Procedure: ESOPHAGOGASTRODUODENOSCOPY WITH BIOPSY;  Surgeon: David Hernandez MD;  Location: [facility] ENDOSCOPY;  Service: Gastroenterology;  Laterality: N/A;  GASTRIC POLYPS/REFLUX ESOPHAGITIS    INCISION AND DRAINAGE TRUNK Right 5/27/2023    Procedure: INCISION AND DRAINAGE OF RIGHT BREAST;  Surgeon: Jordan Pandey MD;  Location: [facility] MAIN OR;  Service: Plastics;  Laterality: Right;    LAPAROSCOPIC CHOLECYSTECTOMY      MASTECTOMY Bilateral 12/07/2021    Procedure: MASTECTOMY;  Surgeon: Ayah Chaparro MD;  Location: [facility] OR  [facility];  Service: General;  Laterality: Bilateral;    ROTATOR CUFF REPAIR Right          Visit Dx:     ICD-10-CM ICD-9-CM   1. At risk for lymphedema  Z91.89 V49.89   2. Malignant neoplasm of upper-outer quadrant of left breast in female, estrogen receptor positive  C50.412 174.4    Z17.0 V86.0   3. History of mastectomy, bilateral  Z90.13 V45.71   4. Malignant neoplasm of left breast in female, estrogen receptor positive, unspecified site of breast  C50.912 174.9    Z17.0 V86.0   5. Disorders of lymph node and lymphatics  I89.9 785.6        Patient History       Row Name 01/04/24 0800             History    Chief Complaint Other 1 (comment)  Lymphedema surveillance program  -SF      Brief Description of Current Complaint Pt is a 63 year old female who had a lumpectomy w/ sentinel node biopsy.  Margy has undergone multiple reconstruction attempts which most recently includes implant placement on 5/10/23 and then implant and mesh removal on 5/27/23 secondary to infection.  Patient presents today for post surgical re-evalutation following reconstruction.  -SF      Hand Dominance right-handed  -SF         Fall Risk Assessment    Any falls in the past year: No  -SF      Does patient have a fear of falling No  -SF         Services    Prior Rehab/Home Health Experiences No  -SF      Are you currently receiving Home Health services No  -SF      Do you plan to receive Home Health services in the near future No  -SF         Daily Activities    Primary Language English  -SF      Are you able to read Yes  -SF      Are you able to write Yes  -SF      How does patient learn best? Demonstration  -SF      Barriers to learning None  -SF      Pt Participated in POC and Goals Yes  -SF         Safety    Are you being hurt, hit, or frightened by anyone at home or in your life? No  -SF      Are you being neglected by a caregiver No  -SF      Have you had any of the following issues with Depression;Anxiety  -SF                User Key   "(r) = Recorded By, (t) = Taken By, (c) = Cosigned By      Initials Name Provider Type    SF Rubi Solano OT Occupational Therapist                     Lymphedema       Row Name 01/04/24 0800             Subjective Pain    Able to rate subjective pain? yes  -SF      Pre-Treatment Pain Level 4  -SF      Post-Treatment Pain Level 4  -SF         Subjective    Subjective Comments Patient reports she has had a wound vac placed for the past week and could possibly have it on for one more week.  -SF         Lymphedema Assessment    Lymphedema Classification LUE:;at risk/stage 0  -SF      Lymphedema Cancer Related Sx bilateral;simple mastectomy;sentinel node biopsy  -SF      Lymph Nodes Removed # 4  -SF      Positive Lymph Nodes # 0  -SF      Chemo Received yes  -SF      Chemo Treatments #/Timeframe anastrozole  -SF      Radiation Therapy Received no  -SF         LLIS - Physical Concerns    The amount of pain associated with my lymphedema is: 0  -SF      The amount of limb heaviness associated with my lymphedema is: 0  -SF      The amount of skin tightness associated with my lymphedema is: 0  -SF      The size of my swollen limb(s) seems: 0  -SF      Lymphedema affects the movement of my swollen limb(s): 0  -SF      The strength in my swollen limb(s) is: 0  -SF         LLIS - Psychosocial Concerns    Lymphedema affects my body image (i.e., \"how I think I look\"). 0  -SF      Lymphedema affects my socializing with others. 0  -SF      Lymphedema affects my intimate relations with spouse or partner (rate 0 if not applicable 0  -SF      Lymphedema \"gets me down\" (i.e., depression, frustration, or anger) 0  -SF      I must rely on others for help due to my lymphedema. 0  -SF      I know what to do to manage my lymphedema 1  -SF         LLIS - Functional Concerns    Lymphedema affects my ability to perform self-care activities (i.e. eating, dressing, hygiene) 0  -SF      Lymphedema affects my ability to perform routine home or " work-related activities. 0  -SF      Lymphedema affects my performance of preferred leisure activities. 0  -SF      Lymphedema affects proper fit of clothing/shoes 0  -SF      Lymphedema affects my sleep 0  -SF         Posture/Observations    Posture- WNL Posture is WNL  -SF         General ROM    GENERAL ROM COMMENTS --  -SF         MMT (Manual Muscle Testing)    General MMT Comments --  -SF         Skin Changes/Observations    Location/Assessment Upper Quadrant  -SF      Upper Quadrant Conditions right:;left:;clean  -SF      Upper Quadrant Color/Pigment right:;left:;erythema  -SF      Upper Quadrant Skin Details Patient presents w/ wound vac on R breast. Patient presents w/ well healing incisions. No indication of infection.  -SF         Lymphedema Measurements    Measurement Type(s) Circumferential  -SF      Volumetric Areas Upper extremities  -SF         L-Dex Bioimpedence Screening    L-Dex Measurement Extremity --  -SF      L-Dex Patient Position --  -SF      L-Dex UE Dominate Side --  -SF      L-Dex UE At Risk Side --  -SF      L-Dex UE Pre Surgical Value --  -SF      L-Dex UE Baseline Score --  -SF      $ L-Dex Charge --  -SF         Lymphedema Life Impact Scale Totals    A.  Total Q1 - Q17 (Do not include Q18) 1  -SF      B.  Total number of questions answered (Q1-Q17) 17  -SF      C. Divide A by B 0.06  -SF      D. Multiple C by 25 1.5  -SF                User Key  (r) = Recorded By, (t) = Taken By, (c) = Cosigned By      Initials Name Provider Type    Rubi Montano OT Occupational Therapist                  Circumferential Measurements:        Patient noted with a -3.82% difference in the LUE compared to the RUE indicating normalize lymphatic functioning.        Therapy Education  09888 - OT Self Care/Mgmt Minutes: 24         OT Goals       Row Name 01/04/24 0950          Time Calculation    OT Goal Re-Cert Due Date 02/03/24  -SF               User Key  (r) = Recorded By, (t) = Taken By, (c) = Cosigned  By      Initials Name Provider Type    Rubi Montano OT Occupational Therapist                       GOALS:  1. Post Breast Surgery Care/at risk for Lymphedema              LTG 1b: 90 days:  As an indicator of no exacerbation of lymphedema staging, the patient will present with an L-Dex score less than 7 points from preoperative baseline.              STATUS: Met: ongoing              STG 1a:   30 days: To prevent exacerbation of mixed edema to lymphedema, patient will utilize the 2 postsurgical compression garments daily.                 STATUS: Met:  Discontinue  STG 1b: 30 days: Patient will be independent with self-manual lymphatic massage.               STATUS: Met: ongoing  STG 1c: 30 days:  Patient will be independent with identification of signs and symptoms of lymphedema exasperation per stoplight to recovery education handout.              STATUS: Met: ongoing  STG 1 d: 30 days: Patient will be independent with HEP to prevent advancement in lymphedema staging.              STATUS: Met: ongoing  TREATMENT:  Self Care/ADL retraining, Therapeutic Activity, Neuromuscular Re-education, Therapeutic Exercise, Bioimpedence Fluid Analysis, [facility]ment [address] [facility], Orthotic Management and training,  and Manual Therapy.        OT Assessment/Plan       Row Name 01/04/24 0941          OT Assessment    Functional Limitations Limitations in functional capacity and performance  -SF     Impairments Impaired lymphatic circulation  -SF     Assessment Comments Patient is a 63-year-old female with diagnosis of left invasive ductal carcinoma.  Patient underwent a bilateral mastectomy w/ sentinel node biopsy 12/7/2021. Margy has undergone multiple reconstruction attempts which including implant placement on 5/10/23 and then implant and mesh removal on 5/27/23 secondary to infection.  Patient presents today for postop reevaluation following reconstruction.  Patient's incisions present to  be well-healing with no indication of infection.  Patient does present with wound vac at the lateral right breast.  Circumferential measurements obtained on this date due to wound VAC placement.  Patient will benefit from continued skilled occupational therapy services to evaluate ongoing lymphatic functioning to prevent further advancement in lymphedema staging.  -SF     OT Diagnosis At risk for lymphedema  -SF     OT Rehab Potential Excellent  -SF     Patient/caregiver participated in establishment of treatment plan and goals Yes  -SF     Patient would benefit from skilled therapy intervention Yes  -SF        OT Plan    OT Frequency Other (comment)  -SF     Predicted Duration of Therapy Intervention (OT) Patient will be reevaluated in 1 month following wound VAC removal, every 3 months years 1 through 3 and every 6 months years 4 and 5  -SF     Planned CPT's? OT RE-EVAL: 63642;OT NEUROMUSC RE EDUCATION EA 15 MIN: 93095;OT SELF CARE/MGMT/TRAIN 15 MIN: 01736;OT HOT/COLD PACK;OT ULTRASOUND EA 15 MIN: 42851;OT MANUAL THERAPY EA 15 MIN: 13706;OT BIS XTRACELL FLUID ANALYSIS: 08906;OT ORTHOTIC MGMT/TRAIN EA 15 MIN: 76867;OT ORTHO/PROSTHET CHECKOUT EA 15 MIN: 21384  -SF     Planned Therapy Interventions (Optional Details) prosthetic fitting/training;patient/family education;postural re-education;ROM (Range of Motion);orthotic fitting/training;manual therapy techniques;home exercise program;strengthening;stretching  -SF     OT Plan Comments Continue POC  -SF               User Key  (r) = Recorded By, (t) = Taken By, (c) = Cosigned By      Initials Name Provider Type    Rubi Montano OT Occupational Therapist                              Time Calculation:   Timed Charges  70202 - OT Self Care/Mgmt Minutes: 24  Total Minutes  Timed Charges Total Minutes: 24   Total Minutes: 24     Therapy Charges for Today       Code Description Service Date Service Provider Modifiers Qty    [id number] HC OT SELF CARE/MGMT/TRAIN EA 15 MIN  1/4/2024 Rubi Solano, OT GO 2                      Rubi Solano, DAVONTE  1/4/2024

## 2024-01-04 NOTE — PROGRESS NOTES
Chief Complaint/Reason for Referral:  Malignant neoplasm of upper-outer quadrant of left breast i    No ref. provider found  Mariangel Anne APRN        Subjective    History of Present Illness    Ms. Fanta Jimenez presents for 6 month follow up for left sided IDC diagnosed in September of 2021. Initially with lumpectomy and then found a second foci of concern and completed bilateral mastectomy on 12/7/2021. She last saw Dr. Watson in February of 2022 and Dr. Watson has now left the practice.  She has been taking Anastrozole and tolerating well. Offers no complaints with any intolerable side effects. Currently undergoing breast reconstruction efforts with breast surgeon in [place] since Sharita left the practice. Reports she will have 1 more surgery to go. She has a drain in place today for the breast reconstruction.     She is due for bone density in April of this year. This was ordered at her last appointment. She is not taking Calcium and Vitamin D supplementation due to intolerability issues and gall stones.     We discussed high  calcium diet and weight bearing exercise.       Oncology/Hematology History Overview Note     ER+ Left Breast Cancer:    Diagnostic mammogram/US 9/22/21: Heterogeneous ill-defined hypoechoic focus at the 12 o'clock position of the subareolar region of the left breast    US guided biopsy: Invasive ductal carcinoma with tubular features, grade 1, largest focus 7 mm, ER positive (100%, strong), IA positive (90%, strong), HER-2 negative (score 1+), Ki-67 5%, associated with ADH    Left Lumpectomy on 10/26/21: Pathology positive for a tubular carcinoma, inferiorly, associated with DCIS, grade 1, 9mm in size, strongly ER/IA+, pT1bN0. A second foci was found incidentally and measured 4mm in size at the edge of the margin.    Bilateral Mastectomy on 12/7/21: No malignancy was detected.       Malignant neoplasm of left breast in female, estrogen receptor positive   10/5/2021 Initial  Diagnosis    Malignant neoplasm of left breast in female, estrogen receptor positive (HCC)         Review of Systems   Constitutional:  Positive for fatigue. Negative for appetite change, diaphoresis, fever, unexpected weight gain and unexpected weight loss.   HENT:  Negative for hearing loss, sore throat and voice change.    Eyes:  Negative for blurred vision, double vision, pain, redness and visual disturbance.   Respiratory:  Negative for cough, shortness of breath and wheezing.    Cardiovascular:  Negative for chest pain, palpitations and leg swelling.   Endocrine: Negative for cold intolerance, heat intolerance, polydipsia and polyuria.   Genitourinary:  Negative for decreased urine volume, difficulty urinating, frequency and urinary incontinence.   Musculoskeletal:  Negative for arthralgias, back pain, joint swelling and myalgias.   Skin:  Negative for color change, rash, skin lesions and wound.   Neurological:  Negative for dizziness, seizures, numbness and headache.   Hematological:  Negative for adenopathy. Does not bruise/bleed easily.   Psychiatric/Behavioral:  Negative for depressed mood. The patient is not nervous/anxious.    All other systems reviewed and are negative.      Current Outpatient Medications on File Prior to Visit   Medication Sig Dispense Refill    amLODIPine (NORVASC) 2.5 MG tablet Take 1 tablet by mouth Every Night.      aspirin 81 MG chewable tablet Chew 1 tablet Daily.      atorvastatin (LIPITOR) 10 MG tablet Take 1 tablet by mouth every night at bedtime.      baclofen (LIORESAL) 10 MG tablet Take 1 tablet by mouth Every Night.      busPIRone (BUSPAR) 10 MG tablet Take 1 tablet by mouth Every Night.      cetirizine (zyrTEC) 10 MG tablet Take 1 tablet by mouth.      ciprofloxacin (CIPRO) 500 MG tablet PLEASE SEE ATTACHED FOR DETAILED DIRECTIONS      doxycycline (VIBRAMYCIN) 100 MG capsule Take 1 capsule by mouth 2 (Two) Times a Day. 28 capsule 0    escitalopram (LEXAPRO) 20 MG tablet  Take 1 tablet by mouth Every Night.      hydrOXYzine (ATARAX) 25 MG tablet Take 1 tablet by mouth At Night As Needed.      lisinopril-hydrochlorothiazide (PRINZIDE,ZESTORETIC) 20-12.5 MG per tablet Take 1 tablet by mouth Every Night.      mirtazapine (REMERON) 15 MG tablet Take 1 tablet by mouth every night at bedtime.      pantoprazole (PROTONIX) 40 MG EC tablet Take 1 tablet by mouth 2 (Two) Times a Day As Needed.      promethazine (PHENERGAN) 25 MG tablet Take 1 tablet by mouth Every 6 (Six) Hours As Needed for Nausea or Vomiting. 20 tablet 0    tamsulosin (FLOMAX) 0.4 MG capsule 24 hr capsule Take 1 capsule by mouth Every Night.      [DISCONTINUED] anastrozole (ARIMIDEX) 1 MG tablet TAKE 1 TABLET BY MOUTH EVERY DAY 90 tablet 0     No current facility-administered medications on file prior to visit.       Allergies   Allergen Reactions    Hydromorphone Anaphylaxis and Palpitations    Hydromorphone Hcl Anaphylaxis and Palpitations    Cephalexin Hives    Iodine Dermatitis     Past Medical History:   Diagnosis Date    Bladder atonia     LAZY BLADDER    Cancer     LEFT BREAST. NO BP OR NEEDLE STICKS IN LEFT ARM.  DID NOT REQUIRE CHEMO OR RADIATION    Depression     GERD (gastroesophageal reflux disease)     High blood pressure     Hyperlipidemia     Neoplasm of left breast     S/P bilateral mastectomy 12/08/2021    NO BP OR NEEDLE STICKS LEFT ARM    Status post bilateral breast reconstruction with implant placement 12/08/2021     Past Surgical History:   Procedure Laterality Date    ABDOMINAL HYSTERECTOMY      BREAST AUGMENTATION Bilateral 12/07/2021    Procedure: BILATERAL BREAST RECONSTRUCTION WITH IMPLANTS, IMPLANT OF BIOLOGICAL MESH,  USE OF SPY, AND BILATERAL CHEST LIPOSUCTION;  Surgeon: Neena Pires MD;  Location: [facility] OR [facility] [place];  Service: Plastics;  Laterality: Bilateral;    BREAST BIOPSY Left 10/26/2021    Procedure: LEFT BREAST LUMPECTOMY WITH SENTINEL NODE BIOPSY AND NEEDLE LOCALIZATION;  Surgeon:  Ayah Chaparro MD;  Location: [facility] MAIN OR;  Service: General;  Laterality: Left;    BREAST RECONSTRUCTION Bilateral 04/27/2022    Procedure: BREAST RECONSTRUCTION REVISION  with bilateral skin resection, bilateral nipple reconstruction.;  Surgeon: Neena Pires MD;  Location: [facility] OR [facility] [place];  Service: Plastics;  Laterality: Bilateral;    BREAST RECONSTRUCTION Bilateral 12/06/2022    Procedure: BREAST RECONSTRUCTION REVISION;  Surgeon: Neena Pires MD;  Location: [facility] OR [facility] [place];  Service: Plastics;  Laterality: Bilateral;    BREAST TISSUE EXPANDER REMOVAL INSERTION OF IMPLANT Right 1/4/2023    Procedure: BREAST IMPLANT REMOVAL;  Surgeon: Jordan Pandey MD;  Location: [facility] OR [facility] [place];  Service: Plastics;  Laterality: Right;    BREAST TISSUE EXPANDER REMOVAL INSERTION OF IMPLANT Right 5/10/2023    Procedure: BREAST TISSUE EXPANDER  INSERTION WITH PLACEMENT OF MESH;  Surgeon: Jordan Pandey MD;  Location: [facility] OR [facility] [place];  Service: Plastics;  Laterality: Right;    BUNIONECTOMY Left     COLONOSCOPY      aprrox 2018     COLONOSCOPY N/A 08/31/2021    Procedure: COLONOSCOPY WITH INJECTION ORISE/POLYPECTOMY HOT SNARE/ENDO CLIP X3;  Surgeon: David Hernandez MD;  Location: [facility] ENDOSCOPY;  Service: Gastroenterology;  Laterality: N/A;  COLON POLYP    CYSTOSCOPY      ENDOSCOPY      ENDOSCOPY N/A 08/31/2021    Procedure: ESOPHAGOGASTRODUODENOSCOPY WITH BIOPSY;  Surgeon: David Hernandez MD;  Location: [facility] ENDOSCOPY;  Service: Gastroenterology;  Laterality: N/A;  GASTRIC POLYPS/REFLUX ESOPHAGITIS    INCISION AND DRAINAGE TRUNK Right 5/27/2023    Procedure: INCISION AND DRAINAGE OF RIGHT BREAST;  Surgeon: Jordan Pandey MD;  Location: [facility] MAIN OR;  Service: Plastics;  Laterality: Right;    LAPAROSCOPIC CHOLECYSTECTOMY      MASTECTOMY Bilateral 12/07/2021    Procedure: MASTECTOMY;  Surgeon: Ayah Chaparro MD;  Location: [facility] OR [facility] [place];  Service: General;   Laterality: Bilateral;    ROTATOR CUFF REPAIR Right      Social History     Socioeconomic History    Marital status:    Tobacco Use    Smoking status: Never     Passive exposure: Never    Smokeless tobacco: Never   Vaping Use    Vaping Use: Never used   Substance and Sexual Activity    Alcohol use: Yes     Alcohol/week: 1.0 standard drink of alcohol     Types: 1 Glasses of wine per week     Comment: OCC    Drug use: Never    Sexual activity: Defer     Family History   Problem Relation Age of Onset    Diabetes Mother     Diabetes Other     Colon cancer Neg Hx     Malig Hyperthermia Neg Hx      Immunization History   Administered Date(s) Administered    COVID-19 (MODERNA) 1st,2nd,3rd Dose Monovalent 03/11/2021, 03/11/2021, 04/08/2021, 04/08/2021, 01/13/2022    COVID-19 F23 (PFIZER) 12YRS+ (COMIRNATY) 10/26/2023    Flu Vaccine Quad PF >36MO 12/18/2020, 01/13/2022    Flu Vaccine Split Quad 12/18/2020    Fluzone (or Fluarix & Flulaval for VFC) >6mos 12/18/2020, 01/13/2022, 10/06/2022    Pneumococcal Conjugate 20-Valent (PCV20) 09/08/2022    Shingrix 06/16/2021, 06/16/2021, 03/17/2022    Tdap 05/08/2020, 05/08/2020       Tobacco Use: Low Risk  (1/4/2024)    Patient History     Smoking Tobacco Use: Never     Smokeless Tobacco Use: Never     Passive Exposure: Never       Objective     Physical Exam  Vitals and nursing note reviewed.   Constitutional:       Appearance: Normal appearance.   HENT:      Nose: Nose normal.      Mouth/Throat:      Mouth: Mucous membranes are moist.   Cardiovascular:      Rate and Rhythm: Normal rate and regular rhythm.      Pulses: Normal pulses.      Heart sounds: Normal heart sounds.   Pulmonary:      Effort: Pulmonary effort is normal. No respiratory distress.      Breath sounds: Normal breath sounds. No wheezing or rales.   Abdominal:      General: Bowel sounds are normal.      Palpations: Abdomen is soft.   Musculoskeletal:         General: Normal range of motion.      Cervical back:  Normal range of motion and neck supple.   Skin:     General: Skin is warm and dry.      Capillary Refill: Capillary refill takes less than 2 seconds.   Neurological:      General: No focal deficit present.      Mental Status: She is alert and oriented to person, place, and time.   Psychiatric:         Mood and Affect: Mood normal.         Behavior: Behavior normal.         Thought Content: Thought content normal.         Judgment: Judgment normal.         Vitals:    01/04/24 1015   BP: 126/80   Pulse: 71   Resp: 16   Temp: 98 °F (36.7 °C)   TempSrc: Temporal   SpO2: 96%   Weight: 93.4 kg (205 lb 14.6 oz)   PainSc: 0-No pain       Wt Readings from Last 3 Encounters:   01/04/24 93.4 kg (205 lb 14.6 oz)   07/13/23 93 kg (205 lb)   07/10/23 94 kg (207 lb 3.2 oz)               ECOG score: 0         ECOG: (0) Fully Active - Able to Carry On All Pre-disease Performance Without Restriction  Fall Risk Assessment was completed, and patient is at low risk for falls.  PHQ-9 Total Score: 0       The patient is  experiencing fatigue. Fatigue score: 4    PT/OT Functional Screening: PT fx screen : No needs identified  Speech Functional Screening: Speech fx screen : No needs identified  Rehab to be ordered: Rehab to be ordered : No needs identified        Result Review :  The following data was reviewed by: CECI Blanton on 01/04/2024:  Lab Results   Component Value Date    HGB 12.7 09/13/2023    HCT 38.1 09/13/2023    MCV 90.9 09/13/2023     09/13/2023    WBC 3.40 (L) 09/13/2023    NEUTROABS 1.60 (L) 09/13/2023    LYMPHSABS 1.32 09/13/2023    MONOSABS 0.29 09/13/2023    EOSABS 0.16 09/13/2023    BASOSABS 0.01 09/13/2023     Lab Results   Component Value Date    GLUCOSE 103 (H) 05/27/2023    BUN 15 11/14/2023    CREATININE 0.9 11/14/2023     11/14/2023    K 4.0 11/14/2023     11/14/2023    CO2 24 11/14/2023    CALCIUM 8.3 (L) 11/14/2023    PROTEINTOT 8.0 05/27/2023    ALBUMIN 4.4 05/27/2023     "BILITOT 0.5 05/27/2023    ALKPHOS 133 (H) 05/27/2023    AST 19 05/27/2023    ALT 20 05/27/2023        No results found for: \"IRON\", \"LABIRON\", \"TRANSFERRIN\", \"TIBC\"  No results found for: \"LDH\", \"FERRITIN\", \"EJIGVLYJ41\", \"FOLATE\"  No results found for: \"PSA\", \"CEA\", \"AFP\", \"\", \"\"    US Soft Tissue    Result Date: 11/2/2023    1. Homogeneous oval 5.2 cm mass with similar echogenicity of the adjacent subcutaneous fat.  Findings likely represent a lipoma.       AZEB DONOVAN MD       Electronically Signed and Approved By: AZEB DONOVAN MD on 11/02/2023 at 14:06                    Assessment and Plan:  Diagnoses and all orders for this visit:    1. Malignant neoplasm of upper-outer quadrant of left breast in female, estrogen receptor positive  -     anastrozole (ARIMIDEX) 1 MG tablet; Take 1 tablet by mouth Daily.  Dispense: 90 tablet; Refill: 1    Early stage left sided IDC, hormone positive breast cancer, Her 2 neg diagnosed in September of 2021. Lumpectomy, then bilateral mastectomies due to second foci of concern. Now, with bilateral reconstruction and is working with breast surgeon in [place] for breast reconstruction. History of breast reconstruction mesh infections previously. Tolerating Anastrozole well without any intolerable side effects. Plan to continue for at least 5 years and consider BCI testing near the end of 5 years to see if she would benefit for extended endocrine therapy.     Bone density ordered for April. Last dexa scan was normal. Encourage weight bearing exercise and foods high in calcium since not taking Calcium / Vitamin D.     Call with any questions or concerns.     I spent 20 minutes caring for Margy on this date of service. This time includes time spent by me in the following activities:preparing for the visit, performing a medically appropriate examination and/or evaluation , counseling and educating the patient/family/caregiver, documenting information in the " medical record, independently interpreting results and communicating that information with the patient/family/caregiver, and care coordination    Patient Follow Up: 6 months with MD. Please schedule with MD at next visit since she has seen NP the last 3 times.     Patient was given instructions and counseling regarding her condition or for health maintenance advice. Please see specific information pulled into the AVS if appropriate.     Veronica Morin, APRN    1/4/2024    .tob

## 2024-04-04 ENCOUNTER — HOSPITAL ENCOUNTER (OUTPATIENT)
Dept: OCCUPATIONAL THERAPY | Facility: HOSPITAL | Age: 64
Setting detail: THERAPIES SERIES
Discharge: HOME | End: 2024-04-04
Payer: COMMERCIAL

## 2024-04-04 DIAGNOSIS — C50.412 MALIGNANT NEOPLASM OF UPPER-OUTER QUADRANT OF LEFT BREAST IN FEMALE, ESTROGEN RECEPTOR POSITIVE: ICD-10-CM

## 2024-04-04 DIAGNOSIS — Z17.0 MALIGNANT NEOPLASM OF UPPER-OUTER QUADRANT OF LEFT BREAST IN FEMALE, ESTROGEN RECEPTOR POSITIVE: ICD-10-CM

## 2024-04-04 DIAGNOSIS — R52 PAIN: ICD-10-CM

## 2024-04-04 DIAGNOSIS — Z90.13 HISTORY OF MASTECTOMY, BILATERAL: ICD-10-CM

## 2024-04-04 DIAGNOSIS — Z17.0 MALIGNANT NEOPLASM OF LEFT BREAST IN FEMALE, ESTROGEN RECEPTOR POSITIVE, UNSPECIFIED SITE OF BREAST: ICD-10-CM

## 2024-04-04 DIAGNOSIS — I89.9 DISORDERS OF LYMPH NODE AND LYMPHATICS: ICD-10-CM

## 2024-04-04 DIAGNOSIS — Z91.89 AT RISK FOR LYMPHEDEMA: Primary | ICD-10-CM

## 2024-04-04 DIAGNOSIS — L90.5 SCAR CONDITION AND FIBROSIS OF SKIN: ICD-10-CM

## 2024-04-04 DIAGNOSIS — C50.912 MALIGNANT NEOPLASM OF LEFT BREAST IN FEMALE, ESTROGEN RECEPTOR POSITIVE, UNSPECIFIED SITE OF BREAST: ICD-10-CM

## 2024-04-04 PROCEDURE — 93702 BIS XTRACELL FLUID ANALYSIS: CPT

## 2024-04-04 PROCEDURE — 97535 SELF CARE MNGMENT TRAINING: CPT

## 2024-04-04 NOTE — THERAPY RE-EVALUATION
Outpatient Occupational Therapy Lymphedema Re-Evaluation   [place]     Patient Name: Margy Jimenez  : 1960  MRN: [id number]  Today's Date: 2024      Visit Date: 2024    Patient Active Problem List   Diagnosis    High blood pressure    Mood disorder    Anxiety    Biceps tendinitis, right    Fatigue    GERD (gastroesophageal reflux disease)    Hyperlipidemia    Migraine    Nontraumatic incomplete tear of right rotator cuff    Pain and swelling of right shoulder    S/P rotator cuff repair    Shoulder impingement, right    Hypertension    Depression    Gastroesophageal reflux disease    Heartburn    Malignant neoplasm of left breast in female, estrogen receptor positive    S/P bilateral mastectomy    Status post bilateral breast reconstruction with implant placement    Postoperative follow-up    Contour irregularity in reconstructed breast    Breast asymmetry    Infected breast tissue expander, subsequent encounter    Arthritis    Bladder obstruction    Insomnia        Past Medical History:   Diagnosis Date    Bladder atonia     LAZY BLADDER    Cancer     LEFT BREAST. NO BP OR NEEDLE STICKS IN LEFT ARM.  DID NOT REQUIRE CHEMO OR RADIATION    Depression     GERD (gastroesophageal reflux disease)     High blood pressure     Hyperlipidemia     Neoplasm of left breast     S/P bilateral mastectomy 2021    NO BP OR NEEDLE STICKS LEFT ARM    Status post bilateral breast reconstruction with implant placement 2021        Past Surgical History:   Procedure Laterality Date    ABDOMINAL HYSTERECTOMY      BREAST AUGMENTATION Bilateral 2021    Procedure: BILATERAL BREAST RECONSTRUCTION WITH IMPLANTS, IMPLANT OF BIOLOGICAL MESH,  USE OF SPY, AND BILATERAL CHEST LIPOSUCTION;  Surgeon: Neena Pires MD;  Location: [facility] OR [facility] [place];  Service: Plastics;  Laterality: Bilateral;    BREAST BIOPSY Left 10/26/2021    Procedure: LEFT BREAST LUMPECTOMY WITH SENTINEL NODE BIOPSY AND NEEDLE  LOCALIZATION;  Surgeon: Ayah Chaparro MD;  Location: [facility] [place] [facility]IN OR;  Service: General;  Laterality: Left;    BREAST RECONSTRUCTION Bilateral 04/27/2022    Procedure: BREAST RECONSTRUCTION REVISION  with bilateral skin resection, bilateral nipple reconstruction.;  Surgeon: Neena Pires MD;  Location: [facility];  Service: Plastics;  Laterality: Bilateral;    BREAST RECONSTRUCTION Bilateral 12/06/2022    Procedure: BREAST RECONSTRUCTION REVISION;  Surgeon: Neena Pires MD;  [place] [facility] [place] [facility] [place]  Service: Plastics;  Laterality: Bilateral;    BREAST TISSUE EXPANDER REMOVAL INSERTION OF IMPLANT Right 1/4/2023    Procedure: BREAST IMPLANT REMOVAL;  Surgeon: Jordan Pandey MD;  Location: [facility] [place] [facility] [place]  Service: Plastics;  Laterality: Right;    BREAST TISSUE EXPANDER REMOVAL INSERTION OF IMPLANT Right 5/10/2023    Procedure: BREAST TISSUE EXPANDER  INSERTION WITH PLACEMENT OF MESH;  Surgeon: Jordan Pandey MD;  Location: [facility] [place] [facility] [place]  Service: Plastics;  Laterality: Right;    BUNIONECTOMY Left     COLONOSCOPY      aprrox 2018     COLONOSCOPY N/A 08/31/2021    Procedure: COLONOSCOPY WITH INJECTION ORISE/POLYPECTOMY HOT SNARE/ENDO CLIP X3;  Surgeon: David Hernandez MD;  Location: [facility] ENDOSCOPY;  Service: Gastroenterology;  Laterality: N/A;  COLON POLYP    CYSTOSCOPY      ENDOSCOPY      ENDOSCOPY N/A 08/31/2021    Procedure: ESOPHAGOGASTRODUODENOSCOPY WITH BIOPSY;  Surgeon: David Hernandez MD;  Location: [facility] ENDOS[facility];  Service: Gastroenterology;  Laterality: N/A;  GASTRIC POLYPS/REFLUX ESOPHAGITIS    INCISION AND DRAINAGE TRUNK Right 5/27/2023    Procedure: INCISION AND DRAINAGE OF RIGHT BREAST;  Surgeon: Jordan Pandey MD;  Location: [facility] OR;  Service: Plastics;  Laterality: Right;    LAPAROSCOPIC CHOLECYSTECTOMY      MASTECTOMY Bilateral 12/07/2021    Procedure: MASTECTOMY;  Surgeon: Ayah Chaparro MD;  Location: [facility];  Service: General;  Laterality: Bilateral;    ROTATOR CUFF REPAIR Right          Visit Dx:     ICD-10-CM ICD-9-CM   1. At risk for lymphedema  Z91.89 V49.89   2. Malignant neoplasm of upper-outer quadrant of left breast in female, estrogen receptor positive  C50.412 174.4    Z17.0 V86.0   3. History of mastectomy, bilateral  Z90.13 V45.71   4. Malignant neoplasm of left breast in female, estrogen receptor positive, unspecified site of breast  C50.912 174.9    Z17.0 V86.0   5. Disorders of lymph node and lymphatics  I89.9 785.6   6. Pain  R52 780.96   7. Scar condition and fibrosis of skin  L90.5 709.2        Patient History       Row Name 04/04/24 0900             History    Chief Complaint Other 1 (comment)  Lymphedema surveillance program  -SF      Brief Description of Current Complaint Pt is a 64 year old female who had a lumpectomy w/ sentinel node biopsy.  Margy has undergone multiple reconstruction attempts which most recently includes implant placement on 5/10/23 and then implant and mesh removal on 5/27/23 secondary to infection.  -SF      Hand Dominance right-handed  -SF         Fall Risk Assessment    Any falls in the past year: No  -SF      Does patient have a fear of falling No  -SF         Services    Prior Rehab/Home Health Experiences No  -SF      Are you currently receiving Home Health services No  -SF      Do you plan to receive Home Health services in the near future No  -SF         Daily Activities    Primary Language English  -SF      Are you able to read Yes  -SF      Are you able to write Yes  -SF      How does patient learn best? Demonstration  -SF      Barriers to learning None  -SF      Pt Participated in POC and Goals Yes  -SF         Safety    Are you being hurt, hit, or frightened by anyone at home or in your life? No  -SF      Are you being neglected by a caregiver No  -SF      Have you had any of the following issues with Depression;Anxiety  -SF                User Key  (r) =  "Recorded By, (t) = Taken By, (c) = Cosigned By      Initials Name Provider Type    SF Rubi Solano OT Occupational Therapist                     Lymphedema       Row Name 04/04/24 0900             Subjective Pain    Able to rate subjective pain? yes  -SF      Pre-Treatment Pain Level 2  -SF      Post-Treatment Pain Level 2  -SF      Subjective Pain Comment B chest wall  -SF         Subjective    Subjective Comments Patient reports she will undergo another surgery due to scar tissue.  -SF         Lymphedema Assessment    Lymphedema Classification LUE:;at risk/stage 0  -SF      Lymphedema Cancer Related Sx bilateral;simple mastectomy;sentinel node biopsy  -SF      Lymph Nodes Removed # 4  -SF      Positive Lymph Nodes # 0  -SF      Chemo Received yes  -SF      Chemo Treatments #/Timeframe Anastrozole  -SF      Radiation Therapy Received no  -SF         LLIS - Physical Concerns    The amount of pain associated with my lymphedema is: 0  -SF      The amount of limb heaviness associated with my lymphedema is: 0  -SF      The amount of skin tightness associated with my lymphedema is: 0  -SF      The size of my swollen limb(s) seems: 0  -SF      Lymphedema affects the movement of my swollen limb(s): 0  -SF      The strength in my swollen limb(s) is: 0  -SF         LLIS - Psychosocial Concerns    Lymphedema affects my body image (i.e., \"how I think I look\"). 0  -SF      Lymphedema affects my socializing with others. 0  -SF      Lymphedema affects my intimate relations with spouse or partner (rate 0 if not applicable 0  -SF      Lymphedema \"gets me down\" (i.e., depression, frustration, or anger) 0  -SF      I must rely on others for help due to my lymphedema. 0  -SF      I know what to do to manage my lymphedema 1  -SF         LLIS - Functional Concerns    Lymphedema affects my ability to perform self-care activities (i.e. eating, dressing, hygiene) 0  -SF      Lymphedema affects my ability to perform routine home or " work-related activities. 0  -SF      Lymphedema affects my performance of preferred leisure activities. 0  -SF      Lymphedema affects proper fit of clothing/shoes 0  -SF      Lymphedema affects my sleep 0  -SF         Posture/Observations    Posture- WNL Posture is WNL  -SF         General ROM    GENERAL ROM COMMENTS BUE WFL  -SF         MMT (Manual Muscle Testing)    General MMT Comments BUE WFL  -SF         Skin Changes/Observations    Location/Assessment Upper Quadrant  -SF      Upper Quadrant Conditions bilateral:;intact;clean  -SF      Upper Quadrant Color/Pigment bilateral:;fibrosis  -SF      Upper Quadrant Skin Details Scar tissue - L breast  -SF         Lymphedema Measurements    Measurement Type(s) Volumetric  -SF      Volumetric Areas Upper extremities  -SF         L-Dex Bioimpedence Screening    L-Dex Measurement Extremity LUE  -SF      L-Dex Patient Position Standing  -SF      L-Dex UE Dominate Side Right  -SF      L-Dex UE At Risk Side Left  -SF      L-Dex UE Pre Surgical Value Yes  -SF      L-Dex UE Score 6.4  -SF      L-Dex UE Baseline Score 2.2  -SF      L-Dex UE Value Change 4.2  -SF      $ L-Dex Charge yes  -SF         Lymphedema Life Impact Scale Totals    A.  Total Q1 - Q17 (Do not include Q18) 1  -SF      B.  Total number of questions answered (Q1-Q17) 17  -SF      C. Divide A by B 0.06  -SF      D. Multiple C by 25 1.5  -SF                User Key  (r) = Recorded By, (t) = Taken By, (c) = Cosigned By      Initials Name Provider Type    Rubi Montano OT Occupational Therapist                            Therapy Education  Education Details: Review of stoplight program. Therapist educating patient to contact lymphedema clinic once she has a surgery date so that she can be re-evaluated 3 weeks post op.  Given: Symptoms/condition management  Program: New, Reinforced  How Provided: Verbal  Provided to: Patient  Level of Understanding: Verbalized  44005 - OT Self Care/Mgmt Minutes: 20         OT  Goals       Row Name 04/04/24 1119          Time Calculation    OT Goal Re-Cert Due Date 05/04/24  -SF               User Key  (r) = Recorded By, (t) = Taken By, (c) = Cosigned By      Initials Name Provider Type    Rubi Montano OT Occupational Therapist                  GOALS:  1. Post Breast Surgery Care/at risk for Lymphedema              LTG 1b: 90 days:  As an indicator of no exacerbation of lymphedema staging, the patient will present with an L-Dex score less than 7 points from preoperative baseline.              STATUS: Met: ongoing              STG 1a:   30 days: To prevent exacerbation of mixed edema to lymphedema, patient will utilize the 2 postsurgical compression garments daily.                 STATUS: Met:  Discontinue  STG 1b: 30 days: Patient will be independent with self-manual lymphatic massage.               STATUS: Met: ongoing  STG 1c: 30 days:  Patient will be independent with identification of signs and symptoms of lymphedema exasperation per stoplight to recovery education handout.              STATUS: Met: ongoing  STG 1 d: 30 days: Patient will be independent with HEP to prevent advancement in lymphedema staging.              STATUS: Met: ongoing  TREATMENT:  Self Care/ADL retraining, Therapeutic Activity, Neuromuscular Re-education, Therapeutic Exercise, Bioimpedence Fluid Analysis, Post-Surgical compression garement [id number] [address] [facility], Orthotic Management and training,  and Manual Therapy.     OT Assessment/Plan       Row Name 04/04/24 1103          OT Assessment    Functional Limitations Limitations in functional capacity and performance  -SF     Impairments Impaired lymphatic circulation  -SF     Assessment Comments Margy is a 64-year-old female with diagnosis of left invasive ductal carcinoma.  Patient underwent a bilateral mastectomy with sentinel node biopsy 12/7/2021.  Margy has undergone multiple reconstruction attempts including implant placement on 5/10/2023 and  then implant and mesh removal on 5/27/2023 secondary to infection.  Patient reports she will undergo an additional surgery due to scar tissue and bilateral breast.  Patient is demonstrating normalized lymphatic functioning per L-Dex measurements.  Patient will continue to benefit from skilled occupational therapy to further evaluate ongoing lymphatic functioning to prevent advancement in lymphedema staging, increased pain and decreased range of motion.  -SF     OT Diagnosis At risk for lymphedema  -SF     OT Rehab Potential Good  -SF     Patient/caregiver participated in establishment of treatment plan and goals Yes  -SF     Patient would benefit from skilled therapy intervention Yes  -SF        OT Plan    OT Frequency Other (comment)  -SF     Predicted Duration of Therapy Intervention (OT) Patient will be reevaluated 3 weeks postop, every 3 months years 1 through 3 and every 6 months years 4 and 5  -SF     Planned CPT's? OT RE-EVAL: 93812;OT NEUROMUSC RE EDUCATION EA 15 MIN: 18001;OT SELF CARE/MGMT/TRAIN 15 MIN: 79629;OT HOT/COLD PACK;OT ULTRASOUND EA 15 MIN: 12809;OT MANUAL THERAPY EA 15 MIN: 97053;OT BIS XTRACELL FLUID ANALYSIS: 22743;OT ORTHOTIC MGMT/TRAIN EA 15 MIN: 85577;OT ORTHO/PROSTHET CHECKOUT EA 15 MIN: 02761  -SF     Planned Therapy Interventions (Optional Details) prosthetic fitting/training;patient/family education;postural re-education;ROM (Range of Motion);orthotic fitting/training;manual therapy techniques;home exercise program;strengthening;stretching  -SF     OT Plan Comments Continue POC  -SF               User Key  (r) = Recorded By, (t) = Taken By, (c) = Cosigned By      Initials Name Provider Type    Rubi Montano OT Occupational Therapist                              Time Calculation:   Timed Charges  93428 - OT Self Care/Mgmt Minutes: 20  Total Minutes  Timed Charges Total Minutes: 20   Total Minutes: 20     Therapy Charges for Today       Code Description Service Date Service Provider  Modifiers Qty    59046349227 HC PT BIS XTRACELL FLUID ANALYSIS 4/4/2024 Rubi Solano OT  1    99299610535 HC OT SELF CARE/MGMT/TRAIN EA 15 MIN 4/4/2024 Rubi Solano OT GO 1                      Rubi Solano OT  4/4/2024

## 2024-05-14 DIAGNOSIS — Z17.0 MALIGNANT NEOPLASM OF UPPER-OUTER QUADRANT OF LEFT BREAST IN FEMALE, ESTROGEN RECEPTOR POSITIVE: ICD-10-CM

## 2024-05-14 DIAGNOSIS — C50.412 MALIGNANT NEOPLASM OF UPPER-OUTER QUADRANT OF LEFT BREAST IN FEMALE, ESTROGEN RECEPTOR POSITIVE: ICD-10-CM

## 2024-05-15 RX ORDER — ANASTROZOLE 1 MG/1
1 TABLET ORAL DAILY
Qty: 90 TABLET | Refills: 0 | Status: SHIPPED | OUTPATIENT
Start: 2024-05-15

## 2024-07-09 ENCOUNTER — OFFICE VISIT (OUTPATIENT)
Dept: ONCOLOGY | Facility: HOSPITAL | Age: 64
End: 2024-07-09
Payer: COMMERCIAL

## 2024-07-09 VITALS
RESPIRATION RATE: 20 BRPM | HEIGHT: 63 IN | SYSTOLIC BLOOD PRESSURE: 154 MMHG | WEIGHT: 209.44 LBS | OXYGEN SATURATION: 100 % | HEART RATE: 104 BPM | BODY MASS INDEX: 37.11 KG/M2 | TEMPERATURE: 98.6 F | DIASTOLIC BLOOD PRESSURE: 79 MMHG

## 2024-07-09 DIAGNOSIS — Z17.0 MALIGNANT NEOPLASM OF UPPER-OUTER QUADRANT OF LEFT BREAST IN FEMALE, ESTROGEN RECEPTOR POSITIVE: Primary | ICD-10-CM

## 2024-07-09 DIAGNOSIS — C50.412 MALIGNANT NEOPLASM OF UPPER-OUTER QUADRANT OF LEFT BREAST IN FEMALE, ESTROGEN RECEPTOR POSITIVE: Primary | ICD-10-CM

## 2024-07-09 PROCEDURE — 99215 OFFICE O/P EST HI 40 MIN: CPT | Performed by: INTERNAL MEDICINE

## 2024-07-09 PROCEDURE — G0463 HOSPITAL OUTPT CLINIC VISIT: HCPCS | Performed by: INTERNAL MEDICINE

## 2024-07-09 RX ORDER — ANASTROZOLE 1 MG/1
1 TABLET ORAL DAILY
Qty: 90 TABLET | Refills: 1 | Status: SHIPPED | OUTPATIENT
Start: 2024-07-09

## 2024-07-09 NOTE — PROGRESS NOTES
Chief Complaint/Care Team   Malignant neoplasm of upper-outer quadrant of left breast i    Mariangel Anne, APRN  Dayana, Mariangel L, APRN    History of Present Illness     Diagnosis: ER+ Left Breast Cancer    Current Treatment: Anastrozole 1 mg PO QD began 1/2022    Previous Treatment:     ER+ Left Breast Cancer:    Diagnostic mammogram/US 9/22/21: Heterogeneous ill-defined hypoechoic focus at the 12 o'clock position of the subareolar region of the left breast    US guided biopsy: Invasive ductal carcinoma with tubular features, grade 1, largest focus 7 mm, ER positive (100%, strong), WA positive (90%, strong), HER-2 negative (score 1+), Ki-67 5%, associated with ADH    Left Lumpectomy on 10/26/21: Pathology positive for a tubular carcinoma, inferiorly, associated with DCIS, grade 1, 9mm in size, strongly ER/WA+, pT1bN0. A second foci was found incidentally and measured 4mm in size at the edge of the margin.    Bilateral Mastectomy on 12/7/21: No malignancy was detected.      Pt transferred care to Dr. Shen on 7/9/2024.    Margy Jimenez is a 64 y.o. female who presents to [facility]ATOLOGY [facility]COLOGY for follow up regarding ER+ Left Breast Cancer diagnosed 9/2021.  Patient status post therapies as above including a bilateral mastectomy in December 2021, with reconstruction, also patient reports undergoing a REMY flap in 2023, with another breast procedure scheduled in August 2024 with Dr. Almonte.  Patient is not currently taking calcium vitamin D supplementation secondary to her concern regarding calcium vitamin D contributing to stone development, she has history of gallstones but is status post cholecystectomy.  No report of breast lesions, significant hot flashes, patient reports tolerating anastrozole well.    History of Present Illness         Review of Systems     Oncology/Hematology History Overview Note     ER+ Left Breast Cancer:    Diagnostic mammogram/US 9/22/21:  "Heterogeneous ill-defined hypoechoic focus at the 12 o'clock position of the subareolar region of the left breast    US guided biopsy: Invasive ductal carcinoma with tubular features, grade 1, largest focus 7 mm, ER positive (100%, strong), TN positive (90%, strong), HER-2 negative (score 1+), Ki-67 5%, associated with ADH    Left Lumpectomy on 10/26/21: Pathology positive for a tubular carcinoma, inferiorly, associated with DCIS, grade 1, 9mm in size, strongly ER/TN+, pT1bN0. A second foci was found incidentally and measured 4mm in size at the edge of the margin.    Bilateral Mastectomy on 12/7/21: No malignancy was detected.       Malignant neoplasm of left breast in female, estrogen receptor positive   10/5/2021 Initial Diagnosis    Malignant neoplasm of left breast in female, estrogen receptor positive (HCC)         Objective     Vitals:    07/09/24 1132   BP: 154/79   Pulse: 104   Resp: 20   Temp: 98.6 °F (37 °C)   SpO2: 100%   Weight: 95 kg (209 lb 7 oz)   Height: 160 cm (63\")   PainSc: 0-No pain     ECOG score: 0         PHQ-9 Total Score:         Physical Exam  Vitals reviewed. Exam conducted with a chaperone present.   Constitutional:       General: She is not in acute distress.     Appearance: Normal appearance.   HENT:      Head: Normocephalic and atraumatic.   Eyes:      Extraocular Movements: Extraocular movements intact.      Conjunctiva/sclera: Conjunctivae normal.   Pulmonary:      Effort: Pulmonary effort is normal.   Musculoskeletal:      Cervical back: Normal range of motion and neck supple.   Skin:     General: Skin is warm and dry.      Findings: No bruising.   Neurological:      Mental Status: She is oriented to person, place, and time.         Physical Exam        Past Medical History     Past Medical History:   Diagnosis Date    Bladder atonia     LAZY BLADDER    Cancer     LEFT BREAST. NO BP OR NEEDLE STICKS IN LEFT ARM.  DID NOT REQUIRE CHEMO OR RADIATION    Depression     GERD " (gastroesophageal reflux disease)     High blood pressure     Hyperlipidemia     Neoplasm of left breast     S/P bilateral mastectomy 12/08/2021    NO BP OR NEEDLE STICKS LEFT ARM    Status post bilateral breast reconstruction with implant placement 12/08/2021     Current Outpatient Medications on File Prior to Visit   Medication Sig Dispense Refill    amLODIPine (NORVASC) 2.5 MG tablet Take 1 tablet by mouth Every Night.      atorvastatin (LIPITOR) 10 MG tablet Take 1 tablet by mouth every night at bedtime.      baclofen (LIORESAL) 10 MG tablet Take 1 tablet by mouth Every Night.      busPIRone (BUSPAR) 10 MG tablet Take 1 tablet by mouth Every Night.      cetirizine (zyrTEC) 10 MG tablet Take 1 tablet by mouth.      ciprofloxacin (CIPRO) 500 MG tablet PLEASE SEE ATTACHED FOR DETAILED DIRECTIONS      doxycycline (VIBRAMYCIN) 100 MG capsule Take 1 capsule by mouth 2 (Two) Times a Day. 28 capsule 0    escitalopram (LEXAPRO) 20 MG tablet Take 1 tablet by mouth Every Night.      hydrOXYzine (ATARAX) 25 MG tablet Take 1 tablet by mouth At Night As Needed.      lisinopril-hydrochlorothiazide (PRINZIDE,ZESTORETIC) 20-12.5 MG per tablet Take 1 tablet by mouth Every Night.      mirtazapine (REMERON) 15 MG tablet Take 1 tablet by mouth every night at bedtime.      pantoprazole (PROTONIX) 40 MG EC tablet Take 1 tablet by mouth 2 (Two) Times a Day As Needed.      promethazine (PHENERGAN) 25 MG tablet Take 1 tablet by mouth Every 6 (Six) Hours As Needed for Nausea or Vomiting. 20 tablet 0    tamsulosin (FLOMAX) 0.4 MG capsule 24 hr capsule Take 1 capsule by mouth Every Night.      [DISCONTINUED] anastrozole (ARIMIDEX) 1 MG tablet TAKE 1 TABLET BY MOUTH EVERY DAY 90 tablet 0     No current facility-administered medications on file prior to visit.      Allergies   Allergen Reactions    Hydromorphone Anaphylaxis and Palpitations    Hydromorphone Hcl Anaphylaxis and Palpitations    Cephalexin Hives    Iodine Dermatitis     Past  Surgical History:   Procedure Laterality Date    ABDOMINAL HYSTERECTOMY      BREAST AUGMENTATION Bilateral 12/07/2021    Procedure: BILATERAL BREAST RECONSTRUCTION WITH IMPLANTS, IMPLANT OF BIOLOGICAL MESH,  USE OF SPY, AND BILATERAL CHEST LIPOSUCTION;  Surgeon: Neena Pires MD;  [facility] [facility] [place] [facility]  Service: Plastics;  Laterality: Bilateral;    BREAST BIOPSY Left 10/26/2021    Procedure: LEFT BREAST LUMPECTOMY WITH SENTINEL NODE BIOPSY AND NEEDLE LOCALIZATION;  Surgeon: Ayah Chaparro MD;  Location: [facility] MAIN OR;  Service: General;  Laterality: Left;    BREAST RECONSTRUCTION Bilateral 04/27/2022    Procedure: BREAST RECONSTRUCTION REVISION  with bilateral skin resection, bilateral nipple reconstruction.;  Surgeon: Neena Pires MD;  [facility] [place] [facility]  Service: Plastics;  Laterality: Bilateral;    BREAST RECONSTRUCTION Bilateral 12/06/2022    Procedure: BREAST RECONSTRUCTION REVISION;  Surgeon: Neena Pires MD;  [facility] [place] [facility]  Service: Plastics;  Laterality: Bilateral;    BREAST TISSUE EXPANDER REMOVAL INSERTION OF IMPLANT Right 1/4/2023    Procedure: BREAST IMPLANT REMOVAL;  Surgeon: Jordan Pandey MD;  [facility] [place] [facility]  Service: Plastics;  Laterality: Right;    BREAST TISSUE EXPANDER REMOVAL INSERTION OF IMPLANT Right 5/10/2023    Procedure: BREAST TISSUE EXPANDER  INSERTION WITH PLACEMENT OF MESH;  Surgeon: Jordan Pandey MD;  [facility] [place] [facility];  Service: Plastics;  Laterality: Right;    BUNIONECTOMY Left     COLONOSCOPY      aprrox 2018     COLONOSCOPY N/A 08/31/2021    Procedure: COLONOSCOPY WITH INJECTION ORISE/POLYPECTOMY HOT SNARE/ENDO CLIP X3;  Surgeon: David Hernandez MD;  Location: [facility] ENDOSCOPY;  Service: Gastroenterology;  Laterality: N/A;  COLON POLYP    CYSTOSCOPY      ENDOSCOPY      ENDOSCOPY N/A 08/31/2021    Procedure: ESOPHAGOGASTRODUODENOSCOPY WITH BIOPSY;  Surgeon: David Hernandez MD;   Location: [facility] ENDOSCOPY;  Service: Gastroenterology;  Laterality: N/A;  GASTRIC POLYPS/REFLUX ESOPHAGITIS    INCISION AND DRAINAGE TRUNK Right 5/27/2023    Procedure: INCISION AND DRAINAGE OF RIGHT BREAST;  Surgeon: Jordan Pandey MD;  Location: [facility] MAIN OR;  Service: Plastics;  Laterality: Right;    LAPAROSCOPIC CHOLECYSTECTOMY      MASTECTOMY Bilateral 12/07/2021    Procedure: MASTECTOMY;  Surgeon: Ayah Chaparro MD;  [place] [facility] [place] [facility]  Service: General;  Laterality: Bilateral;    ROTATOR CUFF REPAIR Right      Social History     Socioeconomic History    Marital status:    Tobacco Use    Smoking status: Never     Passive exposure: Never    Smokeless tobacco: Never   Vaping Use    Vaping status: Never Used   Substance and Sexual Activity    Alcohol use: Yes     Alcohol/week: 1.0 standard drink of alcohol     Types: 1 Glasses of wine per week     Comment: OCC    Drug use: Never    Sexual activity: Defer     Family History   Problem Relation Age of Onset    Diabetes Mother     Diabetes Other     Colon cancer Neg Hx     Malig Hyperthermia Neg Hx        Results     Result Review   The following data was reviewed by: Hamida Shen MD on 07/09/2024:  Lab Results   Component Value Date    HGB 12.1 03/14/2024    HCT 39.2 03/14/2024    MCV 88.9 03/14/2024     03/14/2024    WBC 3.19 (L) 03/14/2024    NEUTROABS 1.60 (L) 03/14/2024    LYMPHSABS 1.18 03/14/2024    MONOSABS 0.23 03/14/2024    EOSABS 0.12 03/14/2024    BASOSABS 0.01 03/14/2024     Lab Results   Component Value Date    GLUCOSE 103 (H) 05/27/2023    BUN 15 11/14/2023    CREATININE 0.9 11/14/2023     11/14/2023    K 4.0 11/14/2023     11/14/2023    CO2 24 11/14/2023    CALCIUM 8.3 (L) 11/14/2023    PROTEINTOT 8.0 05/27/2023    ALBUMIN 4.4 05/27/2023    BILITOT 0.5 05/27/2023    ALKPHOS 133 (H) 05/27/2023    AST 19 05/27/2023    ALT 20 05/27/2023     Lab Results   Component Value Date    FREET4 1.58 06/17/2020     TSH 4.280 06/17/2020       No radiology results for the last day       Assessment & Plan     Diagnoses and all orders for this visit:    1. Malignant neoplasm of upper-outer quadrant of left breast in female, estrogen receptor positive (Primary)  -     CBC & Differential; Future  -     Comprehensive Metabolic Panel; Future  -     Vitamin D,25-Hydroxy; Future  -     anastrozole (ARIMIDEX) 1 MG tablet; Take 1 tablet by mouth Daily.  Dispense: 90 tablet; Refill: 1         Margy Jimenez is a 64 y.o. female who presents to [facility] HEMATOLOGY [facility] ONCOLOGY for  follow up regarding ER+ Left Breast Cancer diagnosed 9/2021.    -pt now s/p bilateral mastectomy with reconstruction, and s/p REMY procedure in 2023 and pt due for another procedure in 8/2024 with plastic surgeon Dr. Almonte  -recommend pt stop Anastrozole for 1 week prior to this surgery and hold for 6 weeks after surgery, otherwise recommend pt continue Anastrozole, provided refills  -pt does not require a mammogram due to mastectomies  -pt on endocrine therapy with Anastrozole which began in 1/2022  -last DEXA scan was 4/2024 was normal, due in 4/2026    -plan to check labs CBC, CMP, Vit D for next clinic appointment in 6 months.    Please note that portions of this note were completed with a voice recognition program.    Electronically signed by Hamida Shen MD, 07/09/24, 4:14 PM EDT.    Assessment & Plan      Follow Up     I spent 45 minutes caring for Margy on this date of service. This time includes time spent by me in the following activities:preparing for the visit, reviewing tests, obtaining and/or reviewing a separately obtained history, performing a medically appropriate examination and/or evaluation , counseling and educating the patient/family/caregiver, ordering medications, tests, or procedures, referring and communicating with other health care professionals , documenting information in the medical record,  independently interpreting results and communicating that information with the patient/family/caregiver, and care coordination    Any chemotherapy or immunotherapy or other systemic therapy treatment plan involves a high risk of complications and/or mortality of patient management.    The patient was seen and examined. Work by the provider also included review and/or ordering of lab tests, review and/or ordering of radiology tests, review and/or ordering of medicine tests, discussion with other physicians or providers, independent review of data, obtaining old records, review/summation of old records, and/or other review.    I have reviewed the family history, social history, and past medical history for this patient. Previous information and data has been reviewed and updated as needed. I have reviewed and verified the chief complaint, history, and other documentation. The patient was interviewed and examined in the clinic and the chart reviewed. The previous observations, recommendations, and conclusions were reviewed including those of other providers.     The plan was discussed with the patient and/or family. The patient was given time to ask questions and these questions were answered. At the conclusion of their visit they had no additional questions or concerns and all questions were answered to their satisfaction.    Patient was given instructions and counseling regarding her condition or for health maintenance advice. Please see specific information pulled into the AVS if appropriate.

## 2024-07-12 ENCOUNTER — APPOINTMENT (OUTPATIENT)
Dept: OCCUPATIONAL THERAPY | Facility: HOSPITAL | Age: 64
End: 2024-07-12
Payer: COMMERCIAL

## 2024-07-17 ENCOUNTER — HOSPITAL ENCOUNTER (OUTPATIENT)
Dept: OCCUPATIONAL THERAPY | Facility: HOSPITAL | Age: 64
Setting detail: THERAPIES SERIES
Discharge: HOME | End: 2024-07-17
Payer: COMMERCIAL

## 2024-07-17 DIAGNOSIS — Z90.13 HISTORY OF MASTECTOMY, BILATERAL: ICD-10-CM

## 2024-07-17 DIAGNOSIS — Z17.0 MALIGNANT NEOPLASM OF LEFT BREAST IN FEMALE, ESTROGEN RECEPTOR POSITIVE, UNSPECIFIED SITE OF BREAST: ICD-10-CM

## 2024-07-17 DIAGNOSIS — I89.9 DISORDERS OF LYMPH NODE AND LYMPHATICS: ICD-10-CM

## 2024-07-17 DIAGNOSIS — C50.912 MALIGNANT NEOPLASM OF LEFT BREAST IN FEMALE, ESTROGEN RECEPTOR POSITIVE, UNSPECIFIED SITE OF BREAST: ICD-10-CM

## 2024-07-17 DIAGNOSIS — L90.5 SCAR CONDITION AND FIBROSIS OF SKIN: ICD-10-CM

## 2024-07-17 DIAGNOSIS — Z17.0 MALIGNANT NEOPLASM OF UPPER-OUTER QUADRANT OF LEFT BREAST IN FEMALE, ESTROGEN RECEPTOR POSITIVE: ICD-10-CM

## 2024-07-17 DIAGNOSIS — Z91.89 AT RISK FOR LYMPHEDEMA: Primary | ICD-10-CM

## 2024-07-17 DIAGNOSIS — C50.412 MALIGNANT NEOPLASM OF UPPER-OUTER QUADRANT OF LEFT BREAST IN FEMALE, ESTROGEN RECEPTOR POSITIVE: ICD-10-CM

## 2024-07-17 PROCEDURE — 93702 BIS XTRACELL FLUID ANALYSIS: CPT

## 2024-07-17 PROCEDURE — 97535 SELF CARE MNGMENT TRAINING: CPT

## 2024-07-17 NOTE — THERAPY RE-EVALUATION
Outpatient Occupational Therapy Lymphedema Re-Evaluation   [place]     Patient Name: Margy Jimenez  : 1960  MRN: [id number]  Today's Date: 2024      Visit Date: 2024    Patient Active Problem List   Diagnosis    High blood pressure    Mood disorder    Anxiety    Biceps tendinitis, right    Fatigue    GERD (gastroesophageal reflux disease)    Hyperlipidemia    Migraine    Nontraumatic incomplete tear of right rotator cuff    Pain and swelling of right shoulder    S/P rotator cuff repair    Shoulder impingement, right    Hypertension    Depression    Gastroesophageal reflux disease    Heartburn    Malignant neoplasm of left breast in female, estrogen receptor positive    S/P bilateral mastectomy    Status post bilateral breast reconstruction with implant placement    Postoperative follow-up    Contour irregularity in reconstructed breast    Breast asymmetry    Infected breast tissue expander, subsequent encounter    Arthritis    Bladder obstruction    Insomnia        Past Medical History:   Diagnosis Date    Bladder atonia     LAZY BLADDER    Cancer     LEFT BREAST. NO BP OR NEEDLE STICKS IN LEFT ARM.  DID NOT REQUIRE CHEMO OR RADIATION    Depression     GERD (gastroesophageal reflux disease)     High blood pressure     Hyperlipidemia     Neoplasm of left breast     S/P bilateral mastectomy 2021    NO BP OR NEEDLE STICKS LEFT ARM    Status post bilateral breast reconstruction with implant placement 2021        Past Surgical History:   Procedure Laterality Date    ABDOMINAL HYSTERECTOMY      BREAST AUGMENTATION Bilateral 2021    Procedure: BILATERAL BREAST RECONSTRUCTION WITH IMPLANTS, IMPLANT OF BIOLOGICAL MESH,  USE OF SPY, AND BILATERAL CHEST LIPOSUCTION;  Surgeon: Neena Pires MD;  Location: [facility] OR [facility];  Service: Plastics;  Laterality: Bilateral;    BREAST BIOPSY Left 10/26/2021    Procedure: LEFT BREAST LUMPECTOMY WITH SENTINEL NODE BIOPSY AND NEEDLE  LOCALIZATION;  Surgeon: Ayah Chaparro MD;  Location: [facility] MAIN OR;  Service: General;  Laterality: Left;    BREAST RECONSTRUCTION Bilateral 04/27/2022    Procedure: BREAST RECONSTRUCTION REVISION  with bilateral skin resection, bilateral nipple reconstruction.;  Surgeon: Neena Pires MD;  Location: [facility] OR [facility];  Service: Plastics;  Laterality: Bilateral;    BREAST RECONSTRUCTION Bilateral 12/06/2022    Procedure: BREAST RECONSTRUCTION REVISION;  Surgeon: Neena Pires MD;  Location: [facility] OR [facility] [place];  Service: Plastics;  Laterality: Bilateral;    BREAST TISSUE EXPANDER REMOVAL INSERTION OF IMPLANT Right 1/4/2023    Procedure: BREAST IMPLANT REMOVAL;  Surgeon: Jordan Pandey MD;  Location: [facility] OR [facility] [place];  Service: Plastics;  Laterality: Right;    BREAST TISSUE EXPANDER REMOVAL INSERTION OF IMPLANT Right 5/10/2023    Procedure: BREAST TISSUE EXPANDER  INSERTION WITH PLACEMENT OF MESH;  Surgeon: Jordan Pandey MD;  Location: [facility] OR [facility] [place];  Service: Plastics;  Laterality: Right;    BUNIONECTOMY Left     COLONOSCOPY      aprrox 2018     COLONOSCOPY N/A 08/31/2021    Procedure: COLONOSCOPY WITH INJECTION ORISE/POLYPECTOMY HOT SNARE/ENDO CLIP X3;  Surgeon: David Hernandez MD;  Location: [facility] ENDOSCOPY;  Service: Gastroenterology;  Laterality: N/A;  COLON POLYP    CYSTOSCOPY      ENDOSCOPY      ENDOSCOPY N/A 08/31/2021    Procedure: ESOPHAGOGASTRODUODENOSCOPY WITH BIOPSY;  Surgeon: David Hernandez MD;  Location: [facility] ENDOSCOPY;  Service: Gastroenterology;  Laterality: N/A;  GASTRIC POLYPS/REFLUX ESOPHAGITIS    INCISION AND DRAINAGE TRUNK Right 5/27/2023    Procedure: INCISION AND DRAINAGE OF RIGHT BREAST;  Surgeon: Jordan Pandey MD;  Location: [facility] MAIN OR;  Service: Plastics;  Laterality: Right;    LAPAROSCOPIC CHOLECYSTECTOMY      MASTECTOMY Bilateral 12/07/2021    Procedure: MASTECTOMY;  Surgeon: Ayah Chaparro MD;  Location: [facility] OR  [facility];  Service: General;  Laterality: Bilateral;    ROTATOR CUFF REPAIR Right          Visit Dx:     ICD-10-CM ICD-9-CM   1. At risk for lymphedema  Z91.89 V49.89   2. Malignant neoplasm of upper-outer quadrant of left breast in female, estrogen receptor positive  C50.412 174.4    Z17.0 V86.0   3. History of mastectomy, bilateral  Z90.13 V45.71   4. Malignant neoplasm of left breast in female, estrogen receptor positive, unspecified site of breast  C50.912 174.9    Z17.0 V86.0   5. Disorders of lymph node and lymphatics  I89.9 785.6   6. Scar condition and fibrosis of skin  L90.5 709.2        Patient History       Row Name 07/17/24 0800             History    Chief Complaint Other 1 (comment)  Lymphedema surveillance program  -SF      Brief Description of Current Complaint Pt is a 64 year old female who had a lumpectomy w/ sentinel node biopsy.  Margy has undergone multiple reconstruction attempts which most recently includes implant placement on 5/10/23 and then implant and mesh removal on 5/27/23 secondary to infection.  -SF      Hand Dominance right-handed  -SF         Fall Risk Assessment    Any falls in the past year: No  -SF      Does patient have a fear of falling No  -SF         Services    Prior Rehab/Home Health Experiences No  -SF      Are you currently receiving Home Health services No  -SF      Do you plan to receive Home Health services in the near future No  -SF         Daily Activities    Primary Language English  -SF      Are you able to read Yes  -SF      Are you able to write Yes  -SF      How does patient learn best? Demonstration  -SF      Barriers to learning None  -SF      Pt Participated in POC and Goals Yes  -SF         Safety    Are you being hurt, hit, or frightened by anyone at home or in your life? No  -SF      Are you being neglected by a caregiver No  -SF      Have you had any of the following issues with Depression;Anxiety  -SF                User Key  (r) = Recorded By, (t) =  "Taken By, (c) = Cosigned By      Initials Name Provider Type    SF Rubi Solano OT Occupational Therapist                     Lymphedema       Row Name 07/17/24 0800             Subjective Pain    Able to rate subjective pain? yes  -SF      Pre-Treatment Pain Level 2  -SF      Post-Treatment Pain Level 2  -SF      Subjective Pain Comment L breast scar tissue  -SF         Subjective    Subjective Comments Patient reports she will have surgery to address her scar tissue 8/16/2024. Patient reports no other concerns.  -SF         Lymphedema Assessment    Lymphedema Classification LUE:;at risk/stage 0  -SF      Lymphedema Cancer Related Sx bilateral;simple mastectomy;sentinel node biopsy  -SF      Lymph Nodes Removed # 4  -SF      Positive Lymph Nodes # 0  -SF      Chemo Received yes  -SF      Chemo Treatments #/Timeframe Anastrozole  -SF      Radiation Therapy Received no  -SF         LLIS - Physical Concerns    The amount of pain associated with my lymphedema is: 0  -SF      The amount of limb heaviness associated with my lymphedema is: 0  -SF      The amount of skin tightness associated with my lymphedema is: 0  -SF      The size of my swollen limb(s) seems: 0  -SF      Lymphedema affects the movement of my swollen limb(s): 0  -SF      The strength in my swollen limb(s) is: 0  -SF         LLIS - Psychosocial Concerns    Lymphedema affects my body image (i.e., \"how I think I look\"). 0  -SF      Lymphedema affects my socializing with others. 0  -SF      Lymphedema affects my intimate relations with spouse or partner (rate 0 if not applicable 0  -SF      Lymphedema \"gets me down\" (i.e., depression, frustration, or anger) 0  -SF      I must rely on others for help due to my lymphedema. 0  -SF      I know what to do to manage my lymphedema 1  -SF         LLIS - Functional Concerns    Lymphedema affects my ability to perform self-care activities (i.e. eating, dressing, hygiene) 0  -SF      Lymphedema affects my ability to " perform routine home or work-related activities. 0  -SF      Lymphedema affects my performance of preferred leisure activities. 0  -SF      Lymphedema affects proper fit of clothing/shoes 0  -SF      Lymphedema affects my sleep 0  -SF         Posture/Observations    Posture- WNL Posture is WNL  -SF         General ROM    GENERAL ROM COMMENTS BUE WFL  -SF         MMT (Manual Muscle Testing)    General MMT Comments BUE WFL  -SF         Skin Changes/Observations    Location/Assessment Upper Quadrant  -SF      Upper Quadrant Conditions bilateral:;intact;clean  -SF      Upper Quadrant Color/Pigment bilateral:;fibrosis  -SF         Lymphedema Measurements    Measurement Type(s) Volumetric  -SF      Volumetric Areas Upper extremities  -SF         L-Dex Bioimpedence Screening    L-Dex Measurement Extremity LUE  -SF      L-Dex Patient Position Standing  -SF      L-Dex UE Dominate Side Right  -SF      L-Dex UE At Risk Side Left  -SF      L-Dex UE Pre Surgical Value Yes  -SF      L-Dex UE Score 5.2  -SF      L-Dex UE Baseline Score 2.2  -SF      L-Dex UE Value Change 3  -SF      $ L-Dex Charge yes  -SF         Lymphedema Life Impact Scale Totals    A.  Total Q1 - Q17 (Do not include Q18) 1  -SF      B.  Total number of questions answered (Q1-Q17) 17  -SF      C. Divide A by B 0.06  -SF      D. Multiple C by 25 1.5  -SF                User Key  (r) = Recorded By, (t) = Taken By, (c) = Cosigned By      Initials Name Provider Type    Rubi Montano OT Occupational Therapist                            Therapy Education  Education Details: Review of stoplight program. Therapist educating patient to contact lymphedema clinic before follow up appointment if she notices an increase in swelling following surgery.  Given: Symptoms/condition management  Program: Reinforced  How Provided: Verbal  Provided to: Patient  Level of Understanding: Verbalized  64604 - OT Self Care/Mgmt Minutes: 25         OT Goals       Row Name 07/17/24 1016           Time Calculation    OT Goal Re-Cert Due Date 08/16/24  -SF               User Key  (r) = Recorded By, (t) = Taken By, (c) = Cosigned By      Initials Name Provider Type    Rubi Montano OT Occupational Therapist                  GOALS:  1. Post Breast Surgery Care/at risk for Lymphedema              LTG 1b: 90 days:  As an indicator of no exacerbation of lymphedema staging, the patient will present with an L-Dex score less than 7 points from preoperative baseline.              STATUS: Met: ongoing              STG 1a:   30 days: To prevent exacerbation of mixed edema to lymphedema, patient will utilize the 2 postsurgical compression garments daily.                 STATUS: Met:  Discontinue  STG 1b: 30 days: Patient will be independent with self-manual lymphatic massage.               STATUS: Met: ongoing  STG 1c: 30 days:  Patient will be independent with identification of signs and symptoms of lymphedema exasperation per stoplight to recovery education handout.              STATUS: Met: ongoing  STG 1 d: 30 days: Patient will be independent with HEP to prevent advancement in lymphedema staging.              STATUS: Met: ongoing  TREATMENT:  Self Care/ADL retraining, Therapeutic Activity, Neuromuscular Re-education, Therapeutic Exercise, Bioimpedence Fluid Analysis, Post-Surgical compression garement [address] [employer]-High, Orthotic Management and training,  and Manual Therapy.     OT Assessment/Plan       Row Name 07/17/24 1014          OT Assessment    Functional Limitations Limitations in functional capacity and performance  -SF     Impairments Impaired lymphatic circulation  -SF     Assessment Comments Margy is a 64 year old female w/ dx of left invasive ductal carcinoma. Patient underwent a bilteral mastectomy w/ sentinel node biopsy 12/7/2021. Margy has since undergone multiple reconstruction attempts and will undergo an additional surgery this coming August. Patient is demonstrating  normalized lymphatic functioning and subjectively reports no concerns. Patient will continue to benefit from skilled occupational therapy to further evaluate ongoing lymphatic functioning to prevent advancement in lymphedema staging, increased pain and decreased ROM.  -SF     OT Diagnosis At risk for lymphedema  -SF     OT Rehab Potential Good  -SF     Patient/caregiver participated in establishment of treatment plan and goals Yes  -SF     Patient would benefit from skilled therapy intervention Yes  -SF        OT Plan    OT Frequency Other (comment)  -SF     Predicted Duration of Therapy Intervention (OT) Patient will be reevaluated 3 weeks postop, every 3 months years 1-3 and every 6 months years 4-5.  -SF     Planned CPT's? OT RE-EVAL: 41261;OT NEUROMUSC RE EDUCATION EA 15 MIN: 06562;OT SELF CARE/MGMT/TRAIN 15 MIN: 89420;OT HOT/COLD PACK;OT ULTRASOUND EA 15 MIN: 03252;OT MANUAL THERAPY EA 15 MIN: 82518;OT BIS XTRACELL FLUID ANALYSIS: 32318;OT ORTHOTIC MGMT/TRAIN EA 15 MIN: 65108;OT ORTHO/PROSTHET CHECKOUT EA 15 MIN: 44318  -SF     Planned Therapy Interventions (Optional Details) prosthetic fitting/training;patient/family education;postural re-education;ROM (Range of Motion);orthotic fitting/training;manual therapy techniques;home exercise program;strengthening;stretching  -SF     OT Plan Comments Continue POC  -SF               User Key  (r) = Recorded By, (t) = Taken By, (c) = Cosigned By      Initials Name Provider Type    SF Rubi Solano OT Occupational Therapist                              Time Calculation:   Timed Charges  12497 - OT Self Care/Mgmt Minutes: 25  Total Minutes  Timed Charges Total Minutes: 25   Total Minutes: 25     Therapy Charges for Today       Code Description Service Date Service Provider Modifiers Qty    64433441323 HC PT BIS XTRACELL FLUID ANALYSIS 7/17/2024 Rubi Solano OT  1    41718071006 HC OT SELF CARE/MGMT/TRAIN EA 15 MIN 7/17/2024 Rubi Solano OT GO 2                      Rubi  Xiomara, OT  7/17/2024

## 2024-09-10 ENCOUNTER — APPOINTMENT (OUTPATIENT)
Dept: OCCUPATIONAL THERAPY | Facility: HOSPITAL | Age: 64
End: 2024-09-10
Payer: COMMERCIAL

## 2024-09-17 ENCOUNTER — HOSPITAL ENCOUNTER (OUTPATIENT)
Dept: OCCUPATIONAL THERAPY | Facility: HOSPITAL | Age: 64
Setting detail: THERAPIES SERIES
Discharge: HOME | End: 2024-09-17
Payer: COMMERCIAL

## 2024-09-17 DIAGNOSIS — L90.5 SCAR CONDITION AND FIBROSIS OF SKIN: ICD-10-CM

## 2024-09-17 DIAGNOSIS — C50.412 MALIGNANT NEOPLASM OF UPPER-OUTER QUADRANT OF LEFT BREAST IN FEMALE, ESTROGEN RECEPTOR POSITIVE: ICD-10-CM

## 2024-09-17 DIAGNOSIS — Z17.0 MALIGNANT NEOPLASM OF UPPER-OUTER QUADRANT OF LEFT BREAST IN FEMALE, ESTROGEN RECEPTOR POSITIVE: ICD-10-CM

## 2024-09-17 DIAGNOSIS — Z91.89 AT RISK FOR LYMPHEDEMA: Primary | ICD-10-CM

## 2024-09-17 DIAGNOSIS — Z90.13 HISTORY OF MASTECTOMY, BILATERAL: ICD-10-CM

## 2024-09-17 DIAGNOSIS — C50.912 MALIGNANT NEOPLASM OF LEFT BREAST IN FEMALE, ESTROGEN RECEPTOR POSITIVE, UNSPECIFIED SITE OF BREAST: ICD-10-CM

## 2024-09-17 DIAGNOSIS — Z17.0 MALIGNANT NEOPLASM OF LEFT BREAST IN FEMALE, ESTROGEN RECEPTOR POSITIVE, UNSPECIFIED SITE OF BREAST: ICD-10-CM

## 2024-09-17 PROCEDURE — 93702 BIS XTRACELL FLUID ANALYSIS: CPT

## 2024-09-17 PROCEDURE — 97535 SELF CARE MNGMENT TRAINING: CPT

## 2025-01-03 ENCOUNTER — OFFICE VISIT (OUTPATIENT)
Dept: FAMILY MEDICINE CLINIC | Facility: CLINIC | Age: 65
End: 2025-01-03
Payer: COMMERCIAL

## 2025-01-03 VITALS
WEIGHT: 205.8 LBS | HEIGHT: 63 IN | HEART RATE: 58 BPM | DIASTOLIC BLOOD PRESSURE: 72 MMHG | SYSTOLIC BLOOD PRESSURE: 138 MMHG | BODY MASS INDEX: 36.46 KG/M2 | OXYGEN SATURATION: 98 %

## 2025-01-03 DIAGNOSIS — Z76.89 ENCOUNTER TO ESTABLISH CARE: ICD-10-CM

## 2025-01-03 DIAGNOSIS — G25.81 RLS (RESTLESS LEGS SYNDROME): ICD-10-CM

## 2025-01-03 DIAGNOSIS — G47.00 INSOMNIA, UNSPECIFIED TYPE: ICD-10-CM

## 2025-01-03 DIAGNOSIS — Z12.11 SCREENING FOR COLON CANCER: ICD-10-CM

## 2025-01-03 DIAGNOSIS — E78.2 MIXED HYPERLIPIDEMIA: ICD-10-CM

## 2025-01-03 DIAGNOSIS — M54.41 ACUTE RIGHT-SIDED LOW BACK PAIN WITH RIGHT-SIDED SCIATICA: ICD-10-CM

## 2025-01-03 DIAGNOSIS — Z00.00 ANNUAL PHYSICAL EXAM: Primary | ICD-10-CM

## 2025-01-03 DIAGNOSIS — I10 HYPERTENSION, UNSPECIFIED TYPE: ICD-10-CM

## 2025-01-03 DIAGNOSIS — N31.2 BLADDER ATONIA: ICD-10-CM

## 2025-01-03 DIAGNOSIS — F32.A DEPRESSION, UNSPECIFIED DEPRESSION TYPE: ICD-10-CM

## 2025-01-03 DIAGNOSIS — F41.9 ANXIETY: ICD-10-CM

## 2025-01-03 DIAGNOSIS — Z86.0100 HX OF COLONIC POLYPS: ICD-10-CM

## 2025-01-03 DIAGNOSIS — Z13.29 SCREENING FOR THYROID DISORDER: ICD-10-CM

## 2025-01-03 DIAGNOSIS — K21.9 GASTROESOPHAGEAL REFLUX DISEASE, UNSPECIFIED WHETHER ESOPHAGITIS PRESENT: ICD-10-CM

## 2025-01-03 RX ORDER — DEXLANSOPRAZOLE 60 MG/1
1 CAPSULE, DELAYED RELEASE ORAL DAILY
COMMUNITY

## 2025-01-03 RX ORDER — PREDNISONE 20 MG/1
TABLET ORAL
Qty: 15 TABLET | Refills: 0 | Status: SHIPPED | OUTPATIENT
Start: 2025-01-03

## 2025-01-03 NOTE — PROGRESS NOTES
Chief Complaint  Establish Care, Annual Exam, Hypertension, and Hyperlipidemia    SUBJECTIVE  Margy Jimenez presents to [facility]    History of Present Illness  Patient is a 64-year-old female who presents today to establish care.  Previous PCP was Mariangel Anne APRN .  She is due annual physical exam, to be done in office today.  Needs chronic condition management of htn, hld, insomnia, RLS, anxiety, depression, bladder atonia, GERD.     Hypertension/present currently amlodipine to 5 mg, started 2012.5 mg.  Initial blood pressure in office was elevated at 150/72.  Manual recheck after sitting down was 138/72.  Patient denies any headache chest pain blurry vision.  Patient is having moderate back pain today. States she pulled a muscle on [place] day that seemed to improve on it's own and then re-injured her right lumbar area on NYE while shopping. She reports she twisted and had immediate pain. She has been taking baclofen three times a day with no relief.     Hyperlipidemia-patient is to update lipid panel.  Last  lipid panel in the chart was from September that showed elevated LDL over 200.  Patient is currently  onLipitor 10 mg.  Denies any adverse effects medication.    Anxiety and depression-patient currently BuSpar 10 mg Lexapro 20 mg. She is also treated for insomnia with hydroxyzine at night.  Patient reports she feels stable at this time.  Mood is well-controlled.  Denies any adverse effects of medication.  Denies any SI or self-harm    Restless leg syndrome-patient is currently taking Remeron 50 mg nightly.  Patient reports this greatly reduces her symptoms and she is able to sleep.    Bladder atonia-patient currently on tamsulosin 0.4 mg daily.   Denies any issues with medication.  She is unable to urinate regularly with this.    Patient has a history of left breast cancer.  She has had a bilateral mastectomy with reconstructive surgery.  Patient is  currently established with Dr. Shen with oncology.  Patient is currently on anastrozole 1 mg therapy.      GERD-patient is currently taking Dexilant 60 mg daily.  Patient reports that controls her reflux symptoms well denies any adverse 6 medication.    Colon cancer screening- 8/31/2021 was her last colonoscopy with Dr. Hernandez.  Polyps were noted.  They recommend she have repeat in 3 years, referral to be placed today.    Patient is due labs. Orders placed at today's visit. Discussed with patient that these are fasting labs.     No other concerns or complaints at this time.  Patient denies any diarrhea, constipation, blood in stool, urinary urgency, frequency, or dysuria, chest pain, shortness of breath or syncope.           Past Medical History:   Diagnosis Date    Bladder atonia     LAZY BLADDER    Cancer     LEFT BREAST. NO BP OR NEEDLE STICKS IN LEFT ARM.  DID NOT REQUIRE CHEMO OR RADIATION    Depression     GERD (gastroesophageal reflux disease)     High blood pressure     Hyperlipidemia     Neoplasm of left breast     S/P bilateral mastectomy 12/08/2021    NO BP OR NEEDLE STICKS LEFT ARM    Status post bilateral breast reconstruction with implant placement 12/08/2021      Family History   Problem Relation Age of Onset    Diabetes Mother     Diabetes Other     Colon cancer Neg Hx     Malig Hyperthermia Neg Hx       Past Surgical History:   Procedure Laterality Date    ABDOMINAL HYSTERECTOMY      BREAST AUGMENTATION Bilateral 12/07/2021    Procedure: BILATERAL BREAST RECONSTRUCTION WITH IMPLANTS, IMPLANT OF BIOLOGICAL MESH,  USE OF SPY, AND BILATERAL CHEST LIPOSUCTION;  Surgeon: Neena Pires MD;  Location: [facility] OR [facility] [place];  Service: Plastics;  Laterality: Bilateral;    BREAST BIOPSY Left 10/26/2021    Procedure: LEFT BREAST LUMPECTOMY WITH SENTINEL NODE BIOPSY AND NEEDLE LOCALIZATION;  Surgeon: Ayah Chaparro MD;  Location: [facility] MAIN OR;  Service: General;  Laterality: Left;    BREAST  RECONSTRUCTION Bilateral 04/27/2022    Procedure: BREAST RECONSTRUCTION REVISION  with bilateral skin resection, bilateral nipple reconstruction.;  Surgeon: Neena Pires MD;  Location: [facility] OR [facility] [place];  Service: Plastics;  Laterality: Bilateral;    BREAST RECONSTRUCTION Bilateral 12/06/2022    Procedure: BREAST RECONSTRUCTION REVISION;  Surgeon: Neena Pires MD;  Location: [facility] OR [facility] [place];  Service: Plastics;  Laterality: Bilateral;    BREAST TISSUE EXPANDER REMOVAL INSERTION OF IMPLANT Right 1/4/2023    Procedure: BREAST IMPLANT REMOVAL;  Surgeon: Jordan Pandey MD;  Location: [facility] OR [facility] [place];  Service: Plastics;  Laterality: Right;    BREAST TISSUE EXPANDER REMOVAL INSERTION OF IMPLANT Right 5/10/2023    Procedure: BREAST TISSUE EXPANDER  INSERTION WITH PLACEMENT OF MESH;  Surgeon: Jordan Pandey MD;  Location: [facility] OR [facility] [place];  Service: Plastics;  Laterality: Right;    BUNIONECTOMY Left     COLONOSCOPY      aprrox 2018     COLONOSCOPY N/A 08/31/2021    Procedure: COLONOSCOPY WITH INJECTION ORISE/POLYPECTOMY HOT SNARE/ENDO CLIP X3;  Surgeon: David Hernandez MD;  Location: [facility] ENDOSCOPY;  Service: Gastroenterology;  Laterality: N/A;  COLON POLYP    CYSTOSCOPY      ENDOSCOPY      ENDOSCOPY N/A 08/31/2021    Procedure: ESOPHAGOGASTRODUODENOSCOPY WITH BIOPSY;  Surgeon: David Hernandez MD;  Location: [facility] ENDOSCOPY;  Service: Gastroenterology;  Laterality: N/A;  GASTRIC POLYPS/REFLUX ESOPHAGITIS    INCISION AND DRAINAGE TRUNK Right 5/27/2023    Procedure: INCISION AND DRAINAGE OF RIGHT BREAST;  Surgeon: Jordan Pandey MD;  Location: [facility] MAIN OR;  Service: Plastics;  Laterality: Right;    LAPAROSCOPIC CHOLECYSTECTOMY      MASTECTOMY Bilateral 12/07/2021    Procedure: MASTECTOMY;  Surgeon: Ayah Chaparro MD;  Location: [facility] OR [facility] [place];  Service: General;  Laterality: Bilateral;    ROTATOR CUFF REPAIR Right         Current Outpatient Medications:      "amLODIPine (NORVASC) 2.5 MG tablet, Take 1 tablet by mouth Every Night., Disp: , Rfl:     anastrozole (ARIMIDEX) 1 MG tablet, Take 1 tablet by mouth Daily., Disp: 90 tablet, Rfl: 1    atorvastatin (LIPITOR) 10 MG tablet, Take 1 tablet by mouth every night at bedtime., Disp: , Rfl:     baclofen (LIORESAL) 10 MG tablet, Take 1 tablet by mouth Every Night. (Patient taking differently: Take 1 tablet by mouth Every Night. Pt has been taking tid), Disp: , Rfl:     busPIRone (BUSPAR) 10 MG tablet, Take 1 tablet by mouth Every Night., Disp: , Rfl:     cetirizine (zyrTEC) 10 MG tablet, Take 1 tablet by mouth., Disp: , Rfl:     escitalopram (LEXAPRO) 20 MG tablet, Take 1 tablet by mouth Every Night., Disp: , Rfl:     hydrOXYzine (ATARAX) 25 MG tablet, Take 1 tablet by mouth At Night As Needed. (Patient taking differently: Take 1 tablet by mouth At Night As Needed for Anxiety. Pt takes 2 at night), Disp: , Rfl:     lisinopril-hydrochlorothiazide (PRINZIDE,ZESTORETIC) 20-12.5 MG per tablet, Take 1 tablet by mouth Every Night., Disp: , Rfl:     mirtazapine (REMERON) 15 MG tablet, Take 1 tablet by mouth every night at bedtime., Disp: , Rfl:     tamsulosin (FLOMAX) 0.4 MG capsule 24 hr capsule, Take 1 capsule by mouth Every Night., Disp: , Rfl:     dexlansoprazole (DEXILANT) 60 MG capsule, Take 1 capsule by mouth Daily., Disp: , Rfl:     predniSONE (DELTASONE) 20 MG tablet, Take 1 tablet with breakfast and 1 tablet with lunch for 5 days, then take 1 tablet with breakfast for 5 days., Disp: 15 tablet, Rfl: 0    OBJECTIVE  Vital Signs:   /72 Comment: manual recheck  Pulse 58   Ht 160 cm (63\")   Wt 93.4 kg (205 lb 12.8 oz)   SpO2 98%   BMI 36.46 kg/m²    Estimated body mass index is 36.46 kg/m² as calculated from the following:    Height as of this encounter: 160 cm (63\").    Weight as of this encounter: 93.4 kg (205 lb 12.8 oz).     Wt Readings from Last 3 Encounters:   01/03/25 93.4 kg (205 lb 12.8 oz)   07/09/24 95 kg " (209 lb 7 oz)   01/04/24 93.4 kg (205 lb 14.6 oz)     BP Readings from Last 3 Encounters:   01/03/25 138/72   07/09/24 154/79   01/04/24 126/80       Physical Exam  Vitals reviewed.   Constitutional:       General: She is awake. She is not in acute distress.     Appearance: She is well-developed and well-groomed. She is morbidly obese. She is not ill-appearing.   HENT:      Head: Normocephalic and atraumatic.      Right Ear: Tympanic membrane, ear canal and external ear normal.      Left Ear: Tympanic membrane, ear canal and external ear normal.      Nose: Nose normal.      Mouth/Throat:      Mouth: Mucous membranes are moist.      Pharynx: Oropharynx is clear. No oropharyngeal exudate or posterior oropharyngeal erythema.   Eyes:      Extraocular Movements: Extraocular movements intact.      Conjunctiva/sclera: Conjunctivae normal.      Pupils: Pupils are equal, round, and reactive to light.   Neck:      Thyroid: No thyroid mass, thyromegaly or thyroid tenderness.   Cardiovascular:      Rate and Rhythm: Normal rate and regular rhythm.      Heart sounds: Normal heart sounds.   Pulmonary:      Effort: Pulmonary effort is normal.      Breath sounds: Normal breath sounds.   Abdominal:      General: Abdomen is flat. Bowel sounds are normal. There is no distension.      Palpations: Abdomen is soft.      Tenderness: There is no abdominal tenderness.   Musculoskeletal:      Cervical back: Normal range of motion and neck supple. No rigidity or tenderness.      Lumbar back: Swelling, spasms and tenderness present. No deformity.        Back:    Lymphadenopathy:      Cervical: No cervical adenopathy.   Skin:     General: Skin is warm and dry.   Neurological:      Mental Status: She is alert and oriented to person, place, and time.   Psychiatric:         Mood and Affect: Mood normal.         Behavior: Behavior normal. Behavior is cooperative.         Thought Content: Thought content normal.         Judgment: Judgment normal.           Result Review    Common labs          3/14/2024    07:41 8/15/2024    09:20   Common Labs   Glucose  94       BUN  17       Creatinine  0.8       Sodium  142       Potassium  3.6       Chloride  102       Calcium  9.4       WBC 3.19        Hemoglobin 12.1        Hematocrit 39.2        Platelets 257           Details          This result is from an external source.               No Images in the past 120 days found..      The above data has been reviewed by CECI Zhu 01/03/2025 12:56 EST.          Patient Care Team:  Krissy Corbett APRN as PCP - General (Nurse Practitioner)  Mariela Watson MD PhD as Consulting Physician (Hematology and Oncology)  Ayah Chaparro MD as Consulting Physician (General Surgery)  Veronica Morin APRN as Nurse Practitioner (Nurse Practitioner)  Hoda Espinoza APRN as Nurse Practitioner (Plastic Surgery)    Class 2 Severe Obesity (BMI >=35 and <=39.9). Obesity-related health conditions include the following: hypertension, dyslipidemias, and GERD. Obesity is unchanged. BMI is is above average; BMI management plan is completed. We discussed portion control, increasing exercise, and Information on healthy weight added to patient's after visit summary.       ASSESSMENT & PLAN    Diagnoses and all orders for this visit:    1. Annual physical exam (Primary)  Comments:  preventative counseling  healthy diet  daily exercise  get adequate sleep  Orders:  -     Lipid Panel; Future  -     TSH+Free T4; Future    2. Encounter to establish care  Comments:  medical hx and current medications reviewed with patient  Orders:  -     Lipid Panel; Future  -     TSH+Free T4; Future    3. Hypertension, unspecified type  Comments:  stable on amlodipine 2.5 mg, zestoretic 20-12.5 mg, continue    4. Mixed hyperlipidemia  Comments:  repeat lipid panel, continue lipitor 10 mg    5. Gastroesophageal reflux disease, unspecified whether esophagitis present  Comments:  stable on  dexilant, continue    6. Screening for thyroid disorder  -     TSH+Free T4; Future    7. Screening for colon cancer  -     Ambulatory Referral For Screening Colonoscopy    8. Hx of colonic polyps  -     Ambulatory Referral For Screening Colonoscopy    9. Acute right-sided low back pain with right-sided sciatica  Comments:  start prednisone taper, if no better follow up  Orders:  -     predniSONE (DELTASONE) 20 MG tablet; Take 1 tablet with breakfast and 1 tablet with lunch for 5 days, then take 1 tablet with breakfast for 5 days.  Dispense: 15 tablet; Refill: 0    10. RLS (restless legs syndrome)  Comments:  stable on remeron 15 mg, continue    11. Insomnia, unspecified type  Comments:  stable on hydroxyzine at night, continue    12. Anxiety  Comments:  stable on buspar, continue    13. Depression, unspecified depression type  Comments:  stable on lexapro 20 mg,continue    14. Bladder atonia  Comments:  stable on flomax 0.4 mg, continue         Tobacco Use: Low Risk  (1/3/2025)    Patient History     Smoking Tobacco Use: Never     Smokeless Tobacco Use: Never     Passive Exposure: Never       Follow Up     Return in about 6 months (around 7/3/2025), or if symptoms worsen or fail to improve, for Next scheduled follow up.      Patient was given instructions and counseling regarding her condition or for health maintenance advice. Please see specific information pulled into the AVS if appropriate.   I have reviewed information obtained and documented by others and I have confirmed the accuracy of this documented note.    CECI Zhu

## 2025-01-15 ENCOUNTER — LAB (OUTPATIENT)
Dept: LAB | Facility: HOSPITAL | Age: 65
End: 2025-01-15
Payer: COMMERCIAL

## 2025-01-15 DIAGNOSIS — Z00.00 ANNUAL PHYSICAL EXAM: ICD-10-CM

## 2025-01-15 DIAGNOSIS — Z17.0 MALIGNANT NEOPLASM OF UPPER-OUTER QUADRANT OF LEFT BREAST IN FEMALE, ESTROGEN RECEPTOR POSITIVE: ICD-10-CM

## 2025-01-15 DIAGNOSIS — Z13.29 SCREENING FOR THYROID DISORDER: ICD-10-CM

## 2025-01-15 DIAGNOSIS — Z76.89 ENCOUNTER TO ESTABLISH CARE: ICD-10-CM

## 2025-01-15 DIAGNOSIS — C50.412 MALIGNANT NEOPLASM OF UPPER-OUTER QUADRANT OF LEFT BREAST IN FEMALE, ESTROGEN RECEPTOR POSITIVE: ICD-10-CM

## 2025-01-15 LAB
25(OH)D3 SERPL-MCNC: 12.8 NG/ML (ref 30–100)
ALBUMIN SERPL-MCNC: 4.6 G/DL (ref 3.5–5.2)
ALBUMIN/GLOB SERPL: 1.3 G/DL
ALP SERPL-CCNC: 121 U/L (ref 39–117)
ALT SERPL W P-5'-P-CCNC: 23 U/L (ref 1–33)
ANION GAP SERPL CALCULATED.3IONS-SCNC: 12.4 MMOL/L (ref 5–15)
AST SERPL-CCNC: 26 U/L (ref 1–32)
BASOPHILS # BLD AUTO: 0.02 10*3/MM3 (ref 0–0.2)
BASOPHILS NFR BLD AUTO: 0.4 % (ref 0–1.5)
BILIRUB SERPL-MCNC: 0.2 MG/DL (ref 0–1.2)
BUN SERPL-MCNC: 15 MG/DL (ref 8–23)
BUN/CREAT SERPL: 17.2 (ref 7–25)
CALCIUM SPEC-SCNC: 9.4 MG/DL (ref 8.6–10.5)
CHLORIDE SERPL-SCNC: 101 MMOL/L (ref 98–107)
CHOLEST SERPL-MCNC: 222 MG/DL (ref 0–200)
CO2 SERPL-SCNC: 26.6 MMOL/L (ref 22–29)
CREAT SERPL-MCNC: 0.87 MG/DL (ref 0.57–1)
DEPRECATED RDW RBC AUTO: 44 FL (ref 37–54)
EGFRCR SERPLBLD CKD-EPI 2021: 74 ML/MIN/1.73
EOSINOPHIL # BLD AUTO: 0.14 10*3/MM3 (ref 0–0.4)
EOSINOPHIL NFR BLD AUTO: 2.7 % (ref 0.3–6.2)
ERYTHROCYTE [DISTWIDTH] IN BLOOD BY AUTOMATED COUNT: 13.6 % (ref 12.3–15.4)
GLOBULIN UR ELPH-MCNC: 3.5 GM/DL
GLUCOSE SERPL-MCNC: 93 MG/DL (ref 65–99)
HCT VFR BLD AUTO: 39.3 % (ref 34–46.6)
HDLC SERPL-MCNC: 53 MG/DL (ref 40–60)
HGB BLD-MCNC: 13.1 G/DL (ref 12–15.9)
IMM GRANULOCYTES # BLD AUTO: 0.02 10*3/MM3 (ref 0–0.05)
IMM GRANULOCYTES NFR BLD AUTO: 0.4 % (ref 0–0.5)
LDLC SERPL CALC-MCNC: 147 MG/DL (ref 0–100)
LDLC/HDLC SERPL: 2.72 {RATIO}
LYMPHOCYTES # BLD AUTO: 1.69 10*3/MM3 (ref 0.7–3.1)
LYMPHOCYTES NFR BLD AUTO: 33 % (ref 19.6–45.3)
MCH RBC QN AUTO: 29.3 PG (ref 26.6–33)
MCHC RBC AUTO-ENTMCNC: 33.3 G/DL (ref 31.5–35.7)
MCV RBC AUTO: 87.9 FL (ref 79–97)
MONOCYTES # BLD AUTO: 0.32 10*3/MM3 (ref 0.1–0.9)
MONOCYTES NFR BLD AUTO: 6.3 % (ref 5–12)
NEUTROPHILS NFR BLD AUTO: 2.93 10*3/MM3 (ref 1.7–7)
NEUTROPHILS NFR BLD AUTO: 57.2 % (ref 42.7–76)
NRBC BLD AUTO-RTO: 0 /100 WBC (ref 0–0.2)
PLATELET # BLD AUTO: 288 10*3/MM3 (ref 140–450)
PMV BLD AUTO: 10.5 FL (ref 6–12)
POTASSIUM SERPL-SCNC: 4 MMOL/L (ref 3.5–5.2)
PROT SERPL-MCNC: 8.1 G/DL (ref 6–8.5)
RBC # BLD AUTO: 4.47 10*6/MM3 (ref 3.77–5.28)
SODIUM SERPL-SCNC: 140 MMOL/L (ref 136–145)
T4 FREE SERPL-MCNC: 1.08 NG/DL (ref 0.92–1.68)
TRIGL SERPL-MCNC: 124 MG/DL (ref 0–150)
TSH SERPL DL<=0.05 MIU/L-ACNC: 2.31 UIU/ML (ref 0.27–4.2)
VLDLC SERPL-MCNC: 22 MG/DL (ref 5–40)
WBC NRBC COR # BLD AUTO: 5.12 10*3/MM3 (ref 3.4–10.8)

## 2025-01-15 PROCEDURE — 84439 ASSAY OF FREE THYROXINE: CPT

## 2025-01-15 PROCEDURE — 36415 COLL VENOUS BLD VENIPUNCTURE: CPT

## 2025-01-15 PROCEDURE — 82306 VITAMIN D 25 HYDROXY: CPT

## 2025-01-15 PROCEDURE — 80050 GENERAL HEALTH PANEL: CPT

## 2025-01-15 PROCEDURE — 80061 LIPID PANEL: CPT

## 2025-01-15 RX ORDER — DEXLANSOPRAZOLE 60 MG/1
1 CAPSULE, DELAYED RELEASE ORAL DAILY
Qty: 90 CAPSULE | Refills: 1 | Status: SHIPPED | OUTPATIENT
Start: 2025-01-15

## 2025-01-17 ENCOUNTER — PATIENT MESSAGE (OUTPATIENT)
Dept: FAMILY MEDICINE CLINIC | Facility: CLINIC | Age: 65
End: 2025-01-17
Payer: COMMERCIAL

## 2025-01-17 DIAGNOSIS — E66.01 MORBID (SEVERE) OBESITY DUE TO EXCESS CALORIES: Primary | ICD-10-CM

## 2025-01-17 DIAGNOSIS — E78.2 MIXED HYPERLIPIDEMIA: Primary | ICD-10-CM

## 2025-01-17 DIAGNOSIS — E55.9 VITAMIN D DEFICIENCY: Primary | ICD-10-CM

## 2025-01-17 RX ORDER — ATORVASTATIN CALCIUM 20 MG/1
20 TABLET, FILM COATED ORAL NIGHTLY
Qty: 90 TABLET | Refills: 0 | Status: SHIPPED | OUTPATIENT
Start: 2025-01-17

## 2025-01-17 RX ORDER — ERGOCALCIFEROL 1.25 MG/1
50000 CAPSULE, LIQUID FILLED ORAL WEEKLY
Qty: 12 CAPSULE | Refills: 2 | Status: SHIPPED | OUTPATIENT
Start: 2025-01-17

## 2025-01-23 ENCOUNTER — OFFICE VISIT (OUTPATIENT)
Dept: ONCOLOGY | Facility: HOSPITAL | Age: 65
End: 2025-01-23
Payer: COMMERCIAL

## 2025-01-23 VITALS
BODY MASS INDEX: 37 KG/M2 | WEIGHT: 208.8 LBS | OXYGEN SATURATION: 99 % | TEMPERATURE: 97.6 F | SYSTOLIC BLOOD PRESSURE: 154 MMHG | DIASTOLIC BLOOD PRESSURE: 85 MMHG | HEART RATE: 69 BPM | HEIGHT: 63 IN | RESPIRATION RATE: 18 BRPM

## 2025-01-23 DIAGNOSIS — C50.412 MALIGNANT NEOPLASM OF UPPER-OUTER QUADRANT OF LEFT BREAST IN FEMALE, ESTROGEN RECEPTOR POSITIVE: Primary | ICD-10-CM

## 2025-01-23 DIAGNOSIS — Z17.0 MALIGNANT NEOPLASM OF UPPER-OUTER QUADRANT OF LEFT BREAST IN FEMALE, ESTROGEN RECEPTOR POSITIVE: Primary | ICD-10-CM

## 2025-01-23 DIAGNOSIS — E55.9 VITAMIN D DEFICIENCY: ICD-10-CM

## 2025-01-23 PROCEDURE — 99214 OFFICE O/P EST MOD 30 MIN: CPT | Performed by: INTERNAL MEDICINE

## 2025-01-23 PROCEDURE — G0463 HOSPITAL OUTPT CLINIC VISIT: HCPCS | Performed by: INTERNAL MEDICINE

## 2025-01-23 RX ORDER — ANASTROZOLE 1 MG/1
1 TABLET ORAL DAILY
Qty: 90 TABLET | Refills: 1 | Status: SHIPPED | OUTPATIENT
Start: 2025-01-23

## 2025-01-23 NOTE — PROGRESS NOTES
Chief Complaint/Care Team   No chief complaint on file.    Mariangel Anne, CECI Corbett, CECI Rey    History of Present Illness     Diagnosis: ER+ Left Breast Cancer    Current Treatment: Anastrozole 1 mg PO QD began 1/2022    Previous Treatment:     ER+ Left Breast Cancer:    Diagnostic mammogram/US 9/22/21: Heterogeneous ill-defined hypoechoic focus at the 12 o'clock position of the subareolar region of the left breast    US guided biopsy: Invasive ductal carcinoma with tubular features, grade 1, largest focus 7 mm, ER positive (100%, strong), LA positive (90%, strong), HER-2 negative (score 1+), Ki-67 5%, associated with ADH    Left Lumpectomy on 10/26/21: Pathology positive for a tubular carcinoma, inferiorly, associated with DCIS, grade 1, 9mm in size, strongly ER/LA+, pT1bN0. A second foci was found incidentally and measured 4mm in size at the edge of the margin.    Bilateral Mastectomy on 12/7/21: No malignancy was detected.      Pt transferred care to Dr. Shen on 7/9/2024.    Margy Jimenez is a 64 y.o. female who presents to [facility] HEMATOLOGY [facility]Y for follow up regarding ER+ Left Breast Cancer diagnosed 9/2021.  Patient status post therapies as above including a bilateral mastectomy in December 2021, with reconstruction, also patient reports undergoing a REMY flap in 2023, with another breast procedure scheduled in August 2024 with Dr. Almonte.  Patient was not currently taking calcium vitamin D supplementation secondary to her concern regarding calcium vitamin D contributing to stone development, she has history of gallstones but is status post cholecystectomy.  Again, No report of breast lesions, significant hot flashes, patient reports tolerating anastrozole well. Pt found to have severely low Vit D so PCP prescribed Vit D repletion which pt began on 1/19/2025.     History of Present Illness         Review of Systems     Oncology/Hematology History Overview  Note     ER+ Left Breast Cancer:    Diagnostic mammogram/US 9/22/21: Heterogeneous ill-defined hypoechoic focus at the 12 o'clock position of the subareolar region of the left breast    US guided biopsy: Invasive ductal carcinoma with tubular features, grade 1, largest focus 7 mm, ER positive (100%, strong), NE positive (90%, strong), HER-2 negative (score 1+), Ki-67 5%, associated with ADH    Left Lumpectomy on 10/26/21: Pathology positive for a tubular carcinoma, inferiorly, associated with DCIS, grade 1, 9mm in size, strongly ER/NE+, pT1bN0. A second foci was found incidentally and measured 4mm in size at the edge of the margin.    Bilateral Mastectomy on 12/7/21: No malignancy was detected.       Malignant neoplasm of left breast in female, estrogen receptor positive   10/5/2021 Initial Diagnosis    Malignant neoplasm of left breast in female, estrogen receptor positive (HCC)         Objective     There were no vitals filed for this visit.              PHQ-9 Total Score:         Physical Exam  Vitals reviewed. Exam conducted with a chaperone present.   Constitutional:       General: She is not in acute distress.     Appearance: Normal appearance.   HENT:      Head: Normocephalic and atraumatic.   Eyes:      Extraocular Movements: Extraocular movements intact.      Conjunctiva/sclera: Conjunctivae normal.   Pulmonary:      Effort: Pulmonary effort is normal.   Musculoskeletal:      Cervical back: Normal range of motion and neck supple.   Skin:     General: Skin is warm and dry.      Findings: No bruising.   Neurological:      Mental Status: She is oriented to person, place, and time.         Physical Exam        Past Medical History     Past Medical History:   Diagnosis Date    Bladder atonia     LAZY BLADDER    Cancer     LEFT BREAST. NO BP OR NEEDLE STICKS IN LEFT ARM.  DID NOT REQUIRE CHEMO OR RADIATION    Depression     GERD (gastroesophageal reflux disease)     High blood pressure     Hyperlipidemia      Neoplasm of left breast     S/P bilateral mastectomy 12/08/2021    NO BP OR NEEDLE STICKS LEFT ARM    Status post bilateral breast reconstruction with implant placement 12/08/2021     Current Outpatient Medications on File Prior to Visit   Medication Sig Dispense Refill    amLODIPine (NORVASC) 2.5 MG tablet Take 1 tablet by mouth Every Night.      anastrozole (ARIMIDEX) 1 MG tablet Take 1 tablet by mouth Daily. 90 tablet 1    atorvastatin (LIPITOR) 20 MG tablet Take 1 tablet by mouth Every Night. 90 tablet 0    baclofen (LIORESAL) 10 MG tablet Take 1 tablet by mouth Every Night. (Patient taking differently: Take 1 tablet by mouth Every Night. Pt has been taking tid)      busPIRone (BUSPAR) 10 MG tablet Take 1 tablet by mouth Every Night.      cetirizine (zyrTEC) 10 MG tablet Take 1 tablet by mouth.      dexlansoprazole (DEXILANT) 60 MG capsule Take 1 capsule by mouth Daily. 90 capsule 1    escitalopram (LEXAPRO) 20 MG tablet Take 1 tablet by mouth Every Night.      hydrOXYzine (ATARAX) 25 MG tablet Take 1 tablet by mouth At Night As Needed. (Patient taking differently: Take 1 tablet by mouth At Night As Needed for Anxiety. Pt takes 2 at night)      lisinopril-hydrochlorothiazide (PRINZIDE,ZESTORETIC) 20-12.5 MG per tablet Take 1 tablet by mouth Every Night.      mirtazapine (REMERON) 15 MG tablet Take 1 tablet by mouth every night at bedtime.      predniSONE (DELTASONE) 20 MG tablet Take 1 tablet with breakfast and 1 tablet with lunch for 5 days, then take 1 tablet with breakfast for 5 days. 15 tablet 0    tamsulosin (FLOMAX) 0.4 MG capsule 24 hr capsule Take 1 capsule by mouth Every Night.      Tirzepatide-Weight Management (ZEPBOUND) 2.5 MG/0.5ML solution auto-injector Inject 0.5 mL under the skin into the appropriate area as directed 1 (One) Time Per Week for 4 doses. 2 mL 0    vitamin D (ERGOCALCIFEROL) 1.25 MG (50173 UT) capsule capsule Take 1 capsule by mouth 1 (One) Time Per Week. 12 capsule 2     No current  facility-administered medications on file prior to visit.      Allergies   Allergen Reactions    Hydromorphone Anaphylaxis and Palpitations    Hydromorphone Hcl Anaphylaxis and Palpitations    Cephalexin Hives    Cyanoacrylate Dermatitis    Iodine Dermatitis     Past Surgical History:   Procedure Laterality Date    ABDOMINAL HYSTERECTOMY      BREAST AUGMENTATION Bilateral 12/07/2021    Procedure: BILATERAL BREAST RECONSTRUCTION WITH IMPLANTS, IMPLANT OF BIOLOGICAL MESH,  USE OF SPY, AND BILATERAL CHEST LIPOSUCTION;  Surgeon: Neena Pires MD;  Location: [facility] OR [facility] [place];  Service: Plastics;  Laterality: Bilateral;    BREAST BIOPSY Left 10/26/2021    Procedure: LEFT BREAST LUMPECTOMY WITH SENTINEL NODE BIOPSY AND NEEDLE LOCALIZATION;  Surgeon: Ayah Chaparro MD;  Location: [facility] MAIN OR;  Service: General;  Laterality: Left;    BREAST RECONSTRUCTION Bilateral 04/27/2022    Procedure: BREAST RECONSTRUCTION REVISION  with bilateral skin resection, bilateral nipple reconstruction.;  Surgeon: Neena Pires MD;  Location: [facility] OR [facility] [place];  Service: Plastics;  Laterality: Bilateral;    BREAST RECONSTRUCTION Bilateral 12/06/2022    Procedure: BREAST RECONSTRUCTION REVISION;  Surgeon: Neena Pires MD;  Location: [facility] OR [facility] [place];  Service: Plastics;  Laterality: Bilateral;    BREAST TISSUE EXPANDER REMOVAL INSERTION OF IMPLANT Right 1/4/2023    Procedure: BREAST IMPLANT REMOVAL;  Surgeon: Jordan Pandey MD;  Location: [facility] OR [facility] [place];  Service: Plastics;  Laterality: Right;    BREAST TISSUE EXPANDER REMOVAL INSERTION OF IMPLANT Right 5/10/2023    Procedure: BREAST TISSUE EXPANDER  INSERTION WITH PLACEMENT OF MESH;  Surgeon: Jordan Panedy MD;  Location: [facility] OR [facility] [place];  Service: Plastics;  Laterality: Right;    BUNIONECTOMY Left     COLONOSCOPY      aprrox 2018     COLONOSCOPY N/A 08/31/2021    Procedure: COLONOSCOPY WITH INJECTION ORISE/POLYPECTOMY HOT SNARE/ENDO CLIP X3;   Surgeon: David Hernandez MD;  Location: [facility] ENDOSCOPY;  Service: Gastroenterology;  Laterality: N/A;  COLON POLYP    CYSTOSCOPY      ENDOSCOPY      ENDOSCOPY N/A 08/31/2021    Procedure: ESOPHAGOGASTRODUODENOSCOPY WITH BIOPSY;  Surgeon: David Hernandez MD;  Location: [facility] ENDOSCOPY;  Service: Gastroenterology;  Laterality: N/A;  GASTRIC POLYPS/REFLUX ESOPHAGITIS    INCISION AND DRAINAGE TRUNK Right 5/27/2023    Procedure: INCISION AND DRAINAGE OF RIGHT BREAST;  Surgeon: Jordan Pandey MD;  Location: [facility] MAIN OR;  Service: Plastics;  Laterality: Right;    LAPAROSCOPIC CHOLECYSTECTOMY      MASTECTOMY Bilateral 12/07/2021    Procedure: MASTECTOMY;  Surgeon: Ayah Chaparro MD;  Location: [facility] OR [facility];  Service: General;  Laterality: Bilateral;    ROTATOR CUFF REPAIR Right      Social History     Socioeconomic History    Marital status:    Tobacco Use    Smoking status: Never     Passive exposure: Never    Smokeless tobacco: Never   Vaping Use    Vaping status: Never Used   Substance and Sexual Activity    Alcohol use: Yes     Alcohol/week: 1.0 standard drink of alcohol     Types: 1 Glasses of wine per week     Comment: OCC    Drug use: Never    Sexual activity: Defer     Family History   Problem Relation Age of Onset    Diabetes Mother     Diabetes Other     Colon cancer Neg Hx     Malig Hyperthermia Neg Hx        Results     Result Review   The following data was reviewed by: Hamida Shen MD on 07/09/2024:  Lab Results   Component Value Date    HGB 13.1 01/15/2025    HCT 39.3 01/15/2025    MCV 87.9 01/15/2025     01/15/2025    WBC 5.12 01/15/2025    NEUTROABS 2.93 01/15/2025    LYMPHSABS 1.69 01/15/2025    MONOSABS 0.32 01/15/2025    EOSABS 0.14 01/15/2025    BASOSABS 0.02 01/15/2025     Lab Results   Component Value Date    GLUCOSE 93 01/15/2025    BUN 15 01/15/2025    CREATININE 0.87 01/15/2025     01/15/2025    K 4.0 01/15/2025     01/15/2025    CO2  26.6 01/15/2025    CALCIUM 9.4 01/15/2025    PROTEINTOT 8.1 01/15/2025    ALBUMIN 4.6 01/15/2025    BILITOT 0.2 01/15/2025    ALKPHOS 121 (H) 01/15/2025    AST 26 01/15/2025    ALT 23 01/15/2025     Lab Results   Component Value Date    FREET4 1.08 01/15/2025    TSH 2.310 01/15/2025       No radiology results for the last day       Assessment & Plan     There are no diagnoses linked to this encounter.       Margy Jimenez is a 64 y.o. female who presents to [facility] HEMATOLOGY & ONCOLOGY for  follow up regarding ER+ Left Breast Cancer diagnosed 9/2021.    -pt now s/p bilateral mastectomy with reconstruction, and s/p REMY procedure in 2023 and pt due for another procedure in 8/2024 with plastic surgeon Dr. Almonte  -recommend pt stop Anastrozole for 1 week prior to this surgery and hold for 6 weeks after surgery, pt stopped AI for 8 weeks and has since resumed her AI, otherwise recommend pt continue Anastrozole, provided refills  -pt does not require a mammogram due to mastectomies  -pt on endocrine therapy with Anastrozole which began in 1/2022  -last DEXA scan was 4/2024 was normal, due in 4/2026  -Discussed most recent labs, CBC, CMP and Vit D, Vit D was low, PCP already prescribed Vit D repletion  -plan to check labs CBC, CMP, Vit D for next clinic appointment in 6 months.    Please note that portions of this note were completed with a voice recognition program.    Electronically signed by Hamida Shen MD, 01/23/25, 9:36 AM EST.    Assessment & Plan      Follow Up     I spent 30 minutes caring for Margy on this date of service. This time includes time spent by me in the following activities:preparing for the visit, reviewing tests, obtaining and/or reviewing a separately obtained history, performing a medically appropriate examination and/or evaluation , counseling and educating the patient/family/caregiver, ordering medications, tests, or procedures, referring and communicating  with other health care professionals , documenting information in the medical record, independently interpreting results and communicating that information with the patient/family/caregiver, and care coordination    Any chemotherapy or immunotherapy or other systemic therapy treatment plan involves a high risk of complications and/or mortality of patient management.    The patient was seen and examined. Work by the provider also included review and/or ordering of lab tests, review and/or ordering of radiology tests, review and/or ordering of medicine tests, discussion with other physicians or providers, independent review of data, obtaining old records, review/summation of old records, and/or other review.    I have reviewed the family history, social history, and past medical history for this patient. Previous information and data has been reviewed and updated as needed. I have reviewed and verified the chief complaint, history, and other documentation. The patient was interviewed and examined in the clinic and the chart reviewed. The previous observations, recommendations, and conclusions were reviewed including those of other providers.     The plan was discussed with the patient and/or family. The patient was given time to ask questions and these questions were answered. At the conclusion of their visit they had no additional questions or concerns and all questions were answered to their satisfaction.    Patient was given instructions and counseling regarding her condition or for health maintenance advice. Please see specific information pulled into the AVS if appropriate.

## 2025-01-27 ENCOUNTER — CLINICAL SUPPORT (OUTPATIENT)
Dept: GASTROENTEROLOGY | Facility: CLINIC | Age: 65
End: 2025-01-27
Payer: COMMERCIAL

## 2025-01-27 ENCOUNTER — PREP FOR SURGERY (OUTPATIENT)
Dept: OTHER | Facility: HOSPITAL | Age: 65
End: 2025-01-27
Payer: COMMERCIAL

## 2025-01-27 DIAGNOSIS — Z12.11 ENCOUNTER FOR SCREENING FOR MALIGNANT NEOPLASM OF COLON: Primary | ICD-10-CM

## 2025-01-27 RX ORDER — POLYETHYLENE GLYCOL 3350, SODIUM SULFATE, SODIUM CHLORIDE, POTASSIUM CHLORIDE, ASCORBIC ACID, SODIUM ASCORBATE 140-9-5.2G
1 KIT ORAL TAKE AS DIRECTED
Qty: 1 EACH | Refills: 0 | Status: SHIPPED | OUTPATIENT
Start: 2025-01-27

## 2025-01-27 NOTE — PROGRESS NOTES
Margy Jimenez  1960  64 y.o.    Reason for call: Screening Colonoscopy  Prep prescribed: Plenvu  Prep instructions reviewed with patient and sent to patient via [employer]  Is the patient currently on any injectable or oral medications for weight loss or diabetes? No  Clearance needed? No  If yes, what clearance is needed? N/A  Clearance has been requested from n/a  The patient has been scheduled for: Colonoscopy    Margy Jimenez is aware they have been scheduled for a screening colonoscopy. Patient has expressed they are not having any symptoms at all.         After your procedure, you will be contacted with results. Please confirm the best phone # to reach the patient: [phone]  Family history of colon cancer? No  If yes, indicate relative: na  Tentative Procedure Date: 4/28/2025        Family History   Problem Relation Age of Onset    Diabetes Mother     Diabetes Other     Colon cancer Neg Hx     Malig Hyperthermia Neg Hx      Past Medical History:   Diagnosis Date    Bladder atonia     LAZY BLADDER    Cancer     LEFT BREAST. NO BP OR NEEDLE STICKS IN LEFT ARM.  DID NOT REQUIRE CHEMO OR RADIATION    Depression     GERD (gastroesophageal reflux disease)     High blood pressure     Hyperlipidemia     Neoplasm of left breast     S/P bilateral mastectomy 12/08/2021    NO BP OR NEEDLE STICKS LEFT ARM    Status post bilateral breast reconstruction with implant placement 12/08/2021     Allergies   Allergen Reactions    Hydromorphone Anaphylaxis and Palpitations    Hydromorphone Hcl Anaphylaxis and Palpitations    Cephalexin Hives    Cyanoacrylate Dermatitis    Iodine Dermatitis     Past Surgical History:   Procedure Laterality Date    ABDOMINAL HYSTERECTOMY      BREAST AUGMENTATION Bilateral 12/07/2021    Procedure: BILATERAL BREAST RECONSTRUCTION WITH IMPLANTS, IMPLANT OF BIOLOGICAL MESH,  USE OF SPY, AND BILATERAL CHEST LIPOSUCTION;  Surgeon: Neena Pires MD;  Location: [facility] [place];   Service: Plastics;  Laterality: Bilateral;    BREAST BIOPSY Left 10/26/2021    Procedure: LEFT BREAST LUMPECTOMY WITH SENTINEL NODE BIOPSY AND NEEDLE LOCALIZATION;  Surgeon: Ayah Chaparro MD;  Location: [facility] MAIN OR;  Service: General;  Laterality: Left;    BREAST RECONSTRUCTION Bilateral 04/27/2022    Procedure: BREAST RECONSTRUCTION REVISION  with bilateral skin resection, bilateral nipple reconstruction.;  Surgeon: Neena Pires MD;  [place] [facility] [place] [facility] [place]  Service: Plastics;  Laterality: Bilateral;    BREAST RECONSTRUCTION Bilateral 12/06/2022    Procedure: BREAST RECONSTRUCTION REVISION;  Surgeon: Neena Pires MD;  [place] [facility] [place] [facility] [place]  Service: Plastics;  Laterality: Bilateral;    BREAST TISSUE EXPANDER REMOVAL INSERTION OF IMPLANT Right 1/4/2023    Procedure: BREAST IMPLANT REMOVAL;  Surgeon: Jordan Pandey MD;  [place] [facility] [place] [facility] [place]  Service: Plastics;  Laterality: Right;    BREAST TISSUE EXPANDER REMOVAL INSERTION OF IMPLANT Right 5/10/2023    Procedure: BREAST TISSUE EXPANDER  INSERTION WITH PLACEMENT OF MESH;  Surgeon: Jordan Pandey MD;  [place] [facility] [place] [facility] [place]  Service: Plastics;  Laterality: Right;    BUNIONECTOMY Left     COLONOSCOPY      aprrox 2018     COLONOSCOPY N/A 08/31/2021    Procedure: COLONOSCOPY WITH INJECTION ORISE/POLYPECTOMY HOT SNARE/ENDO CLIP X3;  Surgeon: David Hernandez MD;  Location: [facility] ENDOSCOPY;  Service: Gastroenterology;  Laterality: N/A;  COLON POLYP    CYSTOSCOPY      ENDOSCOPY      ENDOSCOPY N/A 08/31/2021    Procedure: ESOPHAGOGASTRODUODENOSCOPY WITH BIOPSY;  Surgeon: David Hernandez MD;  Location: [facility] ENDOSCOPY;  Service: Gastroenterology;  Laterality: N/A;  GASTRIC POLYPS/REFLUX ESOPHAGITIS    INCISION AND DRAINAGE TRUNK Right 5/27/2023    Procedure: INCISION AND DRAINAGE OF RIGHT BREAST;  Surgeon: Jordan Pandey MD;  Location: [facility] MAIN OR;  Service: Plastics;  Laterality: Right;     LAPAROSCOPIC CHOLECYSTECTOMY      MASTECTOMY Bilateral 12/07/2021    Procedure: MASTECTOMY;  Surgeon: Ayah Chaparro MD;  [facility] [facility] [place] [facility]  Service: General;  Laterality: Bilateral;    ROTATOR CUFF REPAIR Right      Social History     Socioeconomic History    Marital status:    Tobacco Use    Smoking status: Never     Passive exposure: Never    Smokeless tobacco: Never   Vaping Use    Vaping status: Never Used   Substance and Sexual Activity    Alcohol use: Yes     Alcohol/week: 1.0 standard drink of alcohol     Types: 1 Glasses of wine per week     Comment: OCC    Drug use: Never    Sexual activity: Defer       Current Outpatient Medications:     amLODIPine (NORVASC) 2.5 MG tablet, Take 1 tablet by mouth Every Night., Disp: , Rfl:     anastrozole (ARIMIDEX) 1 MG tablet, Take 1 tablet by mouth Daily., Disp: 90 tablet, Rfl: 1    atorvastatin (LIPITOR) 20 MG tablet, Take 1 tablet by mouth Every Night., Disp: 90 tablet, Rfl: 0    baclofen (LIORESAL) 10 MG tablet, Take 1 tablet by mouth Every Night. (Patient taking differently: Take 1 tablet by mouth Every Night. Pt has been taking tid), Disp: , Rfl:     busPIRone (BUSPAR) 10 MG tablet, Take 1 tablet by mouth Every Night., Disp: , Rfl:     cetirizine (zyrTEC) 10 MG tablet, Take 1 tablet by mouth., Disp: , Rfl:     dexlansoprazole (DEXILANT) 60 MG capsule, Take 1 capsule by mouth Daily., Disp: 90 capsule, Rfl: 1    escitalopram (LEXAPRO) 20 MG tablet, Take 1 tablet by mouth Every Night., Disp: , Rfl:     hydrOXYzine (ATARAX) 25 MG tablet, Take 1 tablet by mouth At Night As Needed. (Patient taking differently: Take 1 tablet by mouth At Night As Needed for Anxiety. Pt takes 2 at night), Disp: , Rfl:     lisinopril-hydrochlorothiazide (PRINZIDE,ZESTORETIC) 20-12.5 MG per tablet, Take 1 tablet by mouth Every Night., Disp: , Rfl:     mirtazapine (REMERON) 15 MG tablet, Take 1 tablet by mouth every night at bedtime., Disp: , Rfl:     tamsulosin (FLOMAX)  0.4 MG capsule 24 hr capsule, Take 1 capsule by mouth Every Night., Disp: , Rfl:     Tirzepatide-Weight Management (ZEPBOUND) 2.5 MG/0.5ML solution auto-injector, Inject 0.5 mL under the skin into the appropriate area as directed 1 (One) Time Per Week for 4 doses., Disp: 2 mL, Rfl: 0    vitamin D (ERGOCALCIFEROL) 1.25 MG (89367 UT) capsule capsule, Take 1 capsule by mouth 1 (One) Time Per Week., Disp: 12 capsule, Rfl: 2    predniSONE (DELTASONE) 20 MG tablet, Take 1 tablet with breakfast and 1 tablet with lunch for 5 days, then take 1 tablet with breakfast for 5 days. (Patient not taking: Reported on 1/27/2025), Disp: 15 tablet, Rfl: 0

## 2025-01-30 ENCOUNTER — TELEPHONE (OUTPATIENT)
Dept: GASTROENTEROLOGY | Facility: CLINIC | Age: 65
End: 2025-01-30
Payer: COMMERCIAL

## 2025-01-30 NOTE — TELEPHONE ENCOUNTER
Rec'd notification from pt's pharmacy that insurance does not cover Plenvu. I have called pt and notified her of the plenvu savings card available on line. Pt verbalized understanding.

## 2025-01-30 NOTE — TELEPHONE ENCOUNTER
Margy A Barbara  1960    Patient requested to Reschedule their Colonoscopy. I have offered to reschedule this patient and patient has agreed. Patient has been rescheduled to 4/25/25    Reason for cancelling/rescheduling: transportation    This procedure was ordered by Baljinder Hernandez MD for an important reason. We want to inform you that there are risks associated with not proceeding with the procedure at this time such as a delay in diagnosis, risk of incurable disease, or cancer.      Updated clearance needed?: NO    Is the patient currently on any injectable medications for weight loss or diabetes? YES ZEPBOUND    If yes, are they aware that they will need to discontinue this 7 days prior to their procedure? YES    Has endo scheduling been notified? Yes    Has the case request been updated? YES    Patient verbalized understanding for all of the above information.

## 2025-01-30 NOTE — TELEPHONE ENCOUNTER
Caller: STEVEN LEGER    Relationship to patient: SELF    Best call back number: [phone]     Chief complaint: TRANSPORTATION ISSUES    Type of visit: COLONOSCOPY    Requested date: ON A FRIDAY     If rescheduling, when is the original appointment: 4/28/25     Additional notes:OK TO CALL BACK ANYTIME. OK TO LEAVE .

## 2025-02-14 ENCOUNTER — OFFICE VISIT (OUTPATIENT)
Dept: FAMILY MEDICINE CLINIC | Facility: CLINIC | Age: 65
End: 2025-02-14
Payer: COMMERCIAL

## 2025-02-14 VITALS
DIASTOLIC BLOOD PRESSURE: 74 MMHG | WEIGHT: 202 LBS | HEART RATE: 60 BPM | BODY MASS INDEX: 35.79 KG/M2 | HEIGHT: 63 IN | SYSTOLIC BLOOD PRESSURE: 145 MMHG | OXYGEN SATURATION: 100 %

## 2025-02-14 DIAGNOSIS — E66.01 MORBID (SEVERE) OBESITY DUE TO EXCESS CALORIES: Primary | ICD-10-CM

## 2025-02-14 PROCEDURE — 99214 OFFICE O/P EST MOD 30 MIN: CPT

## 2025-02-14 NOTE — PROGRESS NOTES
Chief Complaint  Weight Check    SUBJECTIVE  Margy Jimenez presents to [facility]    History of Present Illness  Patient presents today for follow-up on weight loss medication.  She is currently on Zepbound 2.5 mg.  Has completed all 4 doses.  Since last appt they have lost 7 lbs. Denies any adverse effects of medication signs like constipation has been able to be relieved with over-the-counter medications.. Wishes to continue with medication at this time.  She denies that she eats smaller portions and less.  Has not started any exercise.      Past Medical History:   Diagnosis Date    Bladder atonia     LAZY BLADDER    Cancer     LEFT BREAST. NO BP OR NEEDLE STICKS IN LEFT ARM.  DID NOT REQUIRE CHEMO OR RADIATION    Depression     GERD (gastroesophageal reflux disease)     High blood pressure     Hyperlipidemia     Neoplasm of left breast     S/P bilateral mastectomy 12/08/2021    NO BP OR NEEDLE STICKS LEFT ARM    Status post bilateral breast reconstruction with implant placement 12/08/2021      Family History   Problem Relation Age of Onset    Diabetes Mother     Diabetes Other     Colon cancer Neg Hx     Malig Hyperthermia Neg Hx       Past Surgical History:   Procedure Laterality Date    ABDOMINAL HYSTERECTOMY      BREAST AUGMENTATION Bilateral 12/07/2021    Procedure: BILATERAL BREAST RECONSTRUCTION WITH IMPLANTS, IMPLANT OF BIOLOGICAL MESH,  USE OF SPY, AND BILATERAL CHEST LIPOSUCTION;  Surgeon: Neena Pires MD;  [place] [facility] [place] [facility] [place]  Service: Plastics;  Laterality: Bilateral;    BREAST BIOPSY Left 10/26/2021    Procedure: LEFT BREAST LUMPECTOMY WITH SENTINEL NODE BIOPSY AND NEEDLE LOCALIZATION;  Surgeon: Ayah Chaparro MD;  Location: [facility] MAIN OR;  Service: General;  Laterality: Left;    BREAST RECONSTRUCTION Bilateral 04/27/2022    Procedure: BREAST RECONSTRUCTION REVISION  with bilateral skin resection, bilateral nipple reconstruction.;   Surgeon: Neena Pires MD;  Location: [facility] [place] [facility] [place];  Service: Plastics;  Laterality: Bilateral;    BREAST RECONSTRUCTION Bilateral 12/06/2022    Procedure: BREAST RECONSTRUCTION REVISION;  Surgeon: Neena Pires MD;  Location: [facility] OR [facility] [place];  Service: Plastics;  Laterality: Bilateral;    BREAST TISSUE EXPANDER REMOVAL INSERTION OF IMPLANT Right 1/4/2023    Procedure: BREAST IMPLANT REMOVAL;  Surgeon: Jordan Pandey MD;  Location: [facility] OR [facility] [place];  Service: Plastics;  Laterality: Right;    BREAST TISSUE EXPANDER REMOVAL INSERTION OF IMPLANT Right 5/10/2023    Procedure: BREAST TISSUE EXPANDER  INSERTION WITH PLACEMENT OF MESH;  Surgeon: oJrdan Pandey MD;  Location: [facility] OR [facility] [place];  Service: Plastics;  Laterality: Right;    BUNIONECTOMY Left     COLONOSCOPY      aprrox 2018     COLONOSCOPY N/A 08/31/2021    Procedure: COLONOSCOPY WITH INJECTION ORISE/POLYPECTOMY HOT SNARE/ENDO CLIP X3;  Surgeon: David Hernandez MD;  Location: [facility] ENDOSCOPY;  Service: Gastroenterology;  Laterality: N/A;  COLON POLYP    CYSTOSCOPY      ENDOSCOPY      ENDOSCOPY N/A 08/31/2021    Procedure: ESOPHAGOGASTRODUODENOSCOPY WITH BIOPSY;  Surgeon: David Hernandez MD;  Location: [facility] ENDOSCOPY;  Service: Gastroenterology;  Laterality: N/A;  GASTRIC POLYPS/REFLUX ESOPHAGITIS    INCISION AND DRAINAGE TRUNK Right 5/27/2023    Procedure: INCISION AND DRAINAGE OF RIGHT BREAST;  Surgeon: Jordan Pandey MD;  Location: [facility] MAIN OR;  Service: Plastics;  Laterality: Right;    LAPAROSCOPIC CHOLECYSTECTOMY      MASTECTOMY Bilateral 12/07/2021    Procedure: MASTECTOMY;  Surgeon: Ayah Chaparro MD;  Location: [facility] OR [facility] [place];  Service: General;  Laterality: Bilateral;    ROTATOR CUFF REPAIR Right         Current Outpatient Medications:     amLODIPine (NORVASC) 2.5 MG tablet, Take 1 tablet by mouth Every Night., Disp: , Rfl:     anastrozole (ARIMIDEX) 1 MG tablet, Take 1 tablet by mouth  Daily., Disp: 90 tablet, Rfl: 1    atorvastatin (LIPITOR) 20 MG tablet, Take 1 tablet by mouth Every Night., Disp: 90 tablet, Rfl: 0    baclofen (LIORESAL) 10 MG tablet, Take 1 tablet by mouth Every Night. (Patient taking differently: Take 1 tablet by mouth Every Night. Pt has been taking tid), Disp: , Rfl:     busPIRone (BUSPAR) 10 MG tablet, Take 1 tablet by mouth Every Night., Disp: , Rfl:     cetirizine (zyrTEC) 10 MG tablet, Take 1 tablet by mouth., Disp: , Rfl:     dexlansoprazole (DEXILANT) 60 MG capsule, Take 1 capsule by mouth Daily., Disp: 90 capsule, Rfl: 1    escitalopram (LEXAPRO) 20 MG tablet, Take 1 tablet by mouth Every Night., Disp: , Rfl:     hydrOXYzine (ATARAX) 25 MG tablet, Take 1 tablet by mouth At Night As Needed. (Patient taking differently: Take 1 tablet by mouth At Night As Needed for Anxiety. Pt takes 2 at night), Disp: , Rfl:     lisinopril-hydrochlorothiazide (PRINZIDE,ZESTORETIC) 20-12.5 MG per tablet, Take 1 tablet by mouth Every Night., Disp: , Rfl:     mirtazapine (REMERON) 15 MG tablet, Take 1 tablet by mouth every night at bedtime., Disp: , Rfl:     PEG-KCl-NaCl-NaSulf-Na Asc-C (Plenvu) 140 g reconstituted solution solution, Take 140 g by mouth Take As Directed for 2 doses. Take per office instructions, Disp: 1 each, Rfl: 0    tamsulosin (FLOMAX) 0.4 MG capsule 24 hr capsule, Take 1 capsule by mouth Every Night., Disp: , Rfl:     vitamin D (ERGOCALCIFEROL) 1.25 MG (05839 UT) capsule capsule, Take 1 capsule by mouth 1 (One) Time Per Week., Disp: 12 capsule, Rfl: 2    predniSONE (DELTASONE) 20 MG tablet, Take 1 tablet with breakfast and 1 tablet with lunch for 5 days, then take 1 tablet with breakfast for 5 days. (Patient not taking: Reported on 2/14/2025), Disp: 15 tablet, Rfl: 0    Tirzepatide-Weight Management (ZEPBOUND) 5 MG/0.5ML solution auto-injector, Inject 0.5 mL under the skin into the appropriate area as directed 1 (One) Time Per Week for 4 doses., Disp: 2 mL, Rfl:  "0    OBJECTIVE  Vital Signs:   /74 (BP Location: Left arm, Patient Position: Sitting, Cuff Size: Large Adult)   Pulse 60   Ht 160 cm (63\")   Wt 91.6 kg (202 lb)   SpO2 100%   BMI 35.78 kg/m²    Estimated body mass index is 35.78 kg/m² as calculated from the following:    Height as of this encounter: 160 cm (63\").    Weight as of this encounter: 91.6 kg (202 lb).     Wt Readings from Last 3 Encounters:   02/14/25 91.6 kg (202 lb)   01/23/25 94.7 kg (208 lb 12.8 oz)   01/03/25 93.4 kg (205 lb 12.8 oz)     BP Readings from Last 3 Encounters:   02/14/25 145/74   01/23/25 154/85   01/03/25 138/72       Physical Exam  Vitals reviewed.   Constitutional:       General: She is not in acute distress.     Appearance: She is not ill-appearing.   HENT:      Head: Normocephalic and atraumatic.   Eyes:      Conjunctiva/sclera: Conjunctivae normal.   Cardiovascular:      Rate and Rhythm: Normal rate and regular rhythm.      Heart sounds: Normal heart sounds.   Pulmonary:      Effort: Pulmonary effort is normal.      Breath sounds: Normal breath sounds.   Abdominal:      General: Bowel sounds are normal. There is no distension.      Palpations: Abdomen is soft.      Tenderness: There is no abdominal tenderness.   Musculoskeletal:      Cervical back: Normal range of motion.   Neurological:      Mental Status: She is alert and oriented to person, place, and time.   Psychiatric:         Mood and Affect: Mood normal.         Behavior: Behavior normal.         Thought Content: Thought content normal.         Judgment: Judgment normal.          Result Review    Common labs          3/14/2024    07:41 8/15/2024    09:20 1/15/2025    15:59   Common Labs   Glucose   93    Glucose  94        BUN  17     15    Creatinine  0.8     0.87    Sodium  142     140    Potassium  3.6     4.0    Chloride  102     101    Calcium  9.4     9.4    Albumin   4.6    Total Bilirubin   0.2    Alkaline Phosphatase   121    AST (SGOT)   26    ALT " (SGPT)   23    WBC 3.19      5.12    Hemoglobin 12.1      13.1    Hematocrit 39.2      39.3    Platelets 257      288    Total Cholesterol   222    Triglycerides   124    HDL Cholesterol   53    LDL Cholesterol    147       Details          This result is from an external source.               No Images in the past 120 days found..      The above data has been reviewed by CECI Zhu 02/14/2025 15:30 EST.          Patient Care Team:  Krissy Corbett APRN as PCP - General (Nurse Practitioner)  Mariela Watson MD PhD as Consulting Physician (Hematology and Oncology)  Ayah Chaparro MD as Consulting Physician (General Surgery)  Veronica Morin APRN as Nurse Practitioner (Nurse Practitioner)  Hoda Espinoza APRN as Nurse Practitioner (Plastic Surgery)            ASSESSMENT & PLAN    Diagnoses and all orders for this visit:    1. Morbid (severe) obesity due to excess calories (Primary)  Comments:  increase zepboud to 5 mg, 1 month follow up, pair with diet and exercise.  Orders:  -     Tirzepatide-Weight Management (ZEPBOUND) 5 MG/0.5ML solution auto-injector; Inject 0.5 mL under the skin into the appropriate area as directed 1 (One) Time Per Week for 4 doses.  Dispense: 2 mL; Refill: 0         Tobacco Use: Low Risk  (2/14/2025)    Patient History     Smoking Tobacco Use: Never     Smokeless Tobacco Use: Never     Passive Exposure: Never       Follow Up     Return in about 1 month (around 3/14/2025) for weight check.      Patient was given instructions and counseling regarding her condition or for health maintenance advice. Please see specific information pulled into the AVS if appropriate.   I have reviewed information obtained and documented by others and I have confirmed the accuracy of this documented note.    CECI Zhu

## 2025-03-14 ENCOUNTER — OFFICE VISIT (OUTPATIENT)
Dept: FAMILY MEDICINE CLINIC | Facility: CLINIC | Age: 65
End: 2025-03-14
Payer: COMMERCIAL

## 2025-03-14 VITALS
HEART RATE: 84 BPM | DIASTOLIC BLOOD PRESSURE: 78 MMHG | OXYGEN SATURATION: 96 % | HEIGHT: 63 IN | BODY MASS INDEX: 34.34 KG/M2 | WEIGHT: 193.8 LBS | SYSTOLIC BLOOD PRESSURE: 115 MMHG | RESPIRATION RATE: 18 BRPM

## 2025-03-14 RX ORDER — TIRZEPATIDE 5 MG/.5ML
INJECTION, SOLUTION SUBCUTANEOUS
COMMUNITY
Start: 2025-02-17 | End: 2025-03-14 | Stop reason: DRUGHIGH

## 2025-03-14 NOTE — PROGRESS NOTES
Chief Complaint  Weight Check    SUBJECTIVE  Margy Jimenez presents to [facility]    History of Present Illness  Patient is a 64-year-old female presents today for 1 month follow-up on weight loss medication.  Currently on Zepbound 5 mg.  Denies any adverse effects of medication.  Patient has lost 9 pounds since last appointment. Wishes to continue to next dose at this time.     She is scheduled for colonoscopy next month for 3 year repeat.     Past Medical History:   Diagnosis Date    Bladder atonia     LAZY BLADDER    Cancer     LEFT BREAST. NO BP OR NEEDLE STICKS IN LEFT ARM.  DID NOT REQUIRE CHEMO OR RADIATION    Depression     GERD (gastroesophageal reflux disease)     High blood pressure     Hyperlipidemia     Neoplasm of left breast     S/P bilateral mastectomy 12/08/2021    NO BP OR NEEDLE STICKS LEFT ARM    Status post bilateral breast reconstruction with implant placement 12/08/2021      Family History   Problem Relation Age of Onset    Diabetes Mother     Diabetes Other     Colon cancer Neg Hx     Malig Hyperthermia Neg Hx       Past Surgical History:   Procedure Laterality Date    ABDOMINAL HYSTERECTOMY      BREAST AUGMENTATION Bilateral 12/07/2021    Procedure: BILATERAL BREAST RECONSTRUCTION WITH IMPLANTS, IMPLANT OF BIOLOGICAL MESH,  USE OF SPY, AND BILATERAL CHEST LIPOSUCTION;  Surgeon: Neena Pires MD;  [place] [facility] [place] [facility] [place]  Service: Plastics;  Laterality: Bilateral;    BREAST BIOPSY Left 10/26/2021    Procedure: LEFT BREAST LUMPECTOMY WITH SENTINEL NODE BIOPSY AND NEEDLE LOCALIZATION;  Surgeon: Ayah Chaparro MD;  Location: [facility] OR;  Service: General;  Laterality: Left;    BREAST RECONSTRUCTION Bilateral 04/27/2022    Procedure: BREAST RECONSTRUCTION REVISION  with bilateral skin resection, bilateral nipple reconstruction.;  Surgeon: Neena Pires MD;  [place] [facility] [place] [facility] [place];  Service: Plastics;  Laterality: Bilateral;     BREAST RECONSTRUCTION Bilateral 12/06/2022    Procedure: BREAST RECONSTRUCTION REVISION;  Surgeon: Neena Pires MD;  [place] [facility] [place] [facility] [place]  Service: Plastics;  Laterality: Bilateral;    BREAST TISSUE EXPANDER REMOVAL INSERTION OF IMPLANT Right 1/4/2023    Procedure: BREAST IMPLANT REMOVAL;  Surgeon: Jordan Pandey MD;  [place] [facility] [place] [facility] [place]  Service: Plastics;  Laterality: Right;    BREAST TISSUE EXPANDER REMOVAL INSERTION OF IMPLANT Right 5/10/2023    Procedure: BREAST TISSUE EXPANDER  INSERTION WITH PLACEMENT OF MESH;  Surgeon: Jordan Pandey MD;  [place] [facility] [place] [facility] [place]  Service: Plastics;  Laterality: Right;    BUNIONECTOMY Left     COLONOSCOPY      aprrox 2018     COLONOSCOPY N/A 08/31/2021    Procedure: COLONOSCOPY WITH INJECTION ORISE/POLYPECTOMY HOT SNARE/ENDO CLIP X3;  Surgeon: David Hernandez MD;  Location: [facility] ENDOSCOPY;  Service: Gastroenterology;  Laterality: N/A;  COLON POLYP    CYSTOSCOPY      ENDOSCOPY      ENDOSCOPY N/A 08/31/2021    Procedure: ESOPHAGOGASTRODUODENOSCOPY WITH BIOPSY;  Surgeon: David Hernandez MD;  Location: [facility] ENDOSCOPY;  Service: Gastroenterology;  Laterality: N/A;  GASTRIC POLYPS/REFLUX ESOPHAGITIS    INCISION AND DRAINAGE TRUNK Right 5/27/2023    Procedure: INCISION AND DRAINAGE OF RIGHT BREAST;  Surgeon: Jordan Pandey MD;  Location: [facility] MAIN OR;  Service: Plastics;  Laterality: Right;    LAPAROSCOPIC CHOLECYSTECTOMY      MASTECTOMY Bilateral 12/07/2021    Procedure: MASTECTOMY;  Surgeon: Ayah Chaparro MD;  [place] [facility] [place] [facility] [place]  Service: General;  Laterality: Bilateral;    ROTATOR CUFF REPAIR Right         Current Outpatient Medications:     amLODIPine (NORVASC) 2.5 MG tablet, Take 1 tablet by mouth Every Night., Disp: , Rfl:     anastrozole (ARIMIDEX) 1 MG tablet, Take 1 tablet by mouth Daily., Disp: 90 tablet, Rfl: 1    atorvastatin (LIPITOR) 20 MG tablet, Take 1 tablet by mouth Every Night.,  "Disp: 90 tablet, Rfl: 0    baclofen (LIORESAL) 10 MG tablet, Take 1 tablet by mouth Every Night. (Patient taking differently: Take 1 tablet by mouth Every Night. Pt has been taking tid), Disp: , Rfl:     busPIRone (BUSPAR) 10 MG tablet, Take 1 tablet by mouth Every Night., Disp: , Rfl:     cetirizine (zyrTEC) 10 MG tablet, Take 1 tablet by mouth., Disp: , Rfl:     dexlansoprazole (DEXILANT) 60 MG capsule, Take 1 capsule by mouth Daily., Disp: 90 capsule, Rfl: 1    escitalopram (LEXAPRO) 20 MG tablet, Take 1 tablet by mouth Every Night., Disp: , Rfl:     hydrOXYzine (ATARAX) 25 MG tablet, Take 1 tablet by mouth At Night As Needed. (Patient taking differently: Take 1 tablet by mouth At Night As Needed for Anxiety. Pt takes 2 at night), Disp: , Rfl:     lisinopril-hydrochlorothiazide (PRINZIDE,ZESTORETIC) 20-12.5 MG per tablet, Take 1 tablet by mouth Every Night., Disp: , Rfl:     mirtazapine (REMERON) 15 MG tablet, Take 1 tablet by mouth every night at bedtime., Disp: , Rfl:     PEG-KCl-NaCl-NaSulf-Na Asc-C (Plenvu) 140 g reconstituted solution solution, Take 140 g by mouth Take As Directed for 2 doses. Take per office instructions, Disp: 1 each, Rfl: 0    tamsulosin (FLOMAX) 0.4 MG capsule 24 hr capsule, Take 1 capsule by mouth Every Night., Disp: , Rfl:     vitamin D (ERGOCALCIFEROL) 1.25 MG (69863 UT) capsule capsule, Take 1 capsule by mouth 1 (One) Time Per Week., Disp: 12 capsule, Rfl: 2    Tirzepatide-Weight Management (ZEPBOUND) 7.5 MG/0.5ML solution auto-injector, Inject 0.5 mL under the skin into the appropriate area as directed 1 (One) Time Per Week for 4 doses., Disp: 2 mL, Rfl: 0    OBJECTIVE  Vital Signs:   /78   Pulse 84   Resp 18   Ht 160 cm (63\")   Wt 87.9 kg (193 lb 12.8 oz)   SpO2 96%   BMI 34.33 kg/m²    Estimated body mass index is 34.33 kg/m² as calculated from the following:    Height as of this encounter: 160 cm (63\").    Weight as of this encounter: 87.9 kg (193 lb 12.8 oz).     Wt " Readings from Last 3 Encounters:   03/14/25 87.9 kg (193 lb 12.8 oz)   02/14/25 91.6 kg (202 lb)   01/23/25 94.7 kg (208 lb 12.8 oz)     BP Readings from Last 3 Encounters:   03/14/25 115/78   02/14/25 145/74   01/23/25 154/85       Physical Exam  Vitals reviewed.   Constitutional:       General: She is not in acute distress.     Appearance: She is not ill-appearing.   HENT:      Head: Normocephalic and atraumatic.   Eyes:      Conjunctiva/sclera: Conjunctivae normal.   Cardiovascular:      Rate and Rhythm: Normal rate and regular rhythm.      Heart sounds: Normal heart sounds.   Pulmonary:      Effort: Pulmonary effort is normal.      Breath sounds: Normal breath sounds.   Musculoskeletal:      Cervical back: Normal range of motion.   Neurological:      Mental Status: She is alert and oriented to person, place, and time.   Psychiatric:         Mood and Affect: Mood normal.         Behavior: Behavior normal.         Thought Content: Thought content normal.         Judgment: Judgment normal.          Result Review    Common labs          8/15/2024    09:20 1/15/2025    15:59   Common Labs   Glucose  93    Glucose 94        BUN 17     15    Creatinine 0.8     0.87    Sodium 142     140    Potassium 3.6     4.0    Chloride 102     101    Calcium 9.4     9.4    Albumin  4.6    Total Bilirubin  0.2    Alkaline Phosphatase  121    AST (SGOT)  26    ALT (SGPT)  23    WBC  5.12    Hemoglobin  13.1    Hematocrit  39.3    Platelets  288    Total Cholesterol  222    Triglycerides  124    HDL Cholesterol  53    LDL Cholesterol   147       Details          This result is from an external source.               No Images in the past 120 days found..      The above data has been reviewed by CECI Zhu 03/14/2025 07:12 EDT.          Patient Care Team:  Krissy Corbett APRN as PCP - General (Nurse Practitioner)  Mariela Watson MD PhD as Consulting Physician (Hematology and Oncology)  Ayah Chaparro MD as Consulting  Physician (General Surgery)  Veronica Morin APRN as Nurse Practitioner (Nurse Practitioner)  Hoda Espinoza APRN as Nurse Practitioner (Plastic Surgery)            ASSESSMENT & PLAN    Diagnoses and all orders for this visit:    1. BMI 34.0-34.9,adult (Primary)  Comments:  Tolerating well, increase to next dose, work on diet and exercise, 1 month follow-up  Orders:  -     Tirzepatide-Weight Management (ZEPBOUND) 7.5 MG/0.5ML solution auto-injector; Inject 0.5 mL under the skin into the appropriate area as directed 1 (One) Time Per Week for 4 doses.  Dispense: 2 mL; Refill: 0         Tobacco Use: Low Risk  (3/14/2025)    Patient History     Smoking Tobacco Use: Never     Smokeless Tobacco Use: Never     Passive Exposure: Never       Follow Up     Return in about 1 month (around 4/14/2025) for weight check.      Patient was given instructions and counseling regarding her condition or for health maintenance advice. Please see specific information pulled into the AVS if appropriate.   I have reviewed information obtained and documented by others and I have confirmed the accuracy of this documented note.    CECI Zhu

## 2025-03-28 RX ORDER — LISINOPRIL AND HYDROCHLOROTHIAZIDE 12.5; 2 MG/1; MG/1
1 TABLET ORAL NIGHTLY
Qty: 30 TABLET | Refills: 0 | Status: SHIPPED | OUTPATIENT
Start: 2025-03-28

## 2025-03-28 RX ORDER — ESCITALOPRAM OXALATE 20 MG/1
20 TABLET ORAL NIGHTLY
Qty: 30 TABLET | Refills: 0 | Status: SHIPPED | OUTPATIENT
Start: 2025-03-28

## 2025-03-28 RX ORDER — MIRTAZAPINE 15 MG/1
15 TABLET, FILM COATED ORAL
Qty: 30 TABLET | Refills: 0 | Status: SHIPPED | OUTPATIENT
Start: 2025-03-28

## 2025-03-28 NOTE — TELEPHONE ENCOUNTER
LEXAPRO  LRF 4/17/2021  LOV 3/14/2025  F/U 4/16/2025    LISINOPRIL-HCTZ  LRF 4/17/2021  LOV 3/14/2025  F/U 4/16/2025    REMERON  LRF 4/17/2021  LOV 3/14/2025  F/U 4/16/2025

## 2025-04-14 DIAGNOSIS — E78.2 MIXED HYPERLIPIDEMIA: ICD-10-CM

## 2025-04-15 RX ORDER — ATORVASTATIN CALCIUM 20 MG/1
20 TABLET, FILM COATED ORAL
Qty: 90 TABLET | Refills: 0 | Status: SHIPPED | OUTPATIENT
Start: 2025-04-15

## 2025-04-16 ENCOUNTER — OFFICE VISIT (OUTPATIENT)
Dept: FAMILY MEDICINE CLINIC | Facility: CLINIC | Age: 65
End: 2025-04-16
Payer: COMMERCIAL

## 2025-04-16 VITALS
HEIGHT: 63 IN | BODY MASS INDEX: 33.66 KG/M2 | HEART RATE: 69 BPM | OXYGEN SATURATION: 98 % | DIASTOLIC BLOOD PRESSURE: 79 MMHG | SYSTOLIC BLOOD PRESSURE: 118 MMHG | WEIGHT: 190 LBS
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
## 2025-04-17 NOTE — PRE-PROCEDURE INSTRUCTIONS
Arrival time of .0630    Must have a  over 18 years old for transportation post prodecure, as well as photo ID and insurance card.    Education provided on laxative administration; bowel prep to be taken in two doses. Reviewed diet instructions for day prior to procedure. Only plain, unflavored water after midnight until two hours prior to arrival time.    Take any morning medications of the day and the procedure as well as the night before. Bring all prescribed medications or a list of medications and inhalers to the hospital on the morning of the procedure.    Patient verbalized understanding of instructions

## 2025-04-24 ENCOUNTER — ANESTHESIA EVENT (OUTPATIENT)
Dept: GASTROENTEROLOGY | Facility: HOSPITAL | Age: 65
End: 2025-04-24
Payer: COMMERCIAL

## 2025-04-24 ENCOUNTER — TELEPHONE (OUTPATIENT)
Dept: GASTROENTEROLOGY | Facility: CLINIC | Age: 65
End: 2025-04-24
Payer: COMMERCIAL

## 2025-04-24 NOTE — ANESTHESIA PREPROCEDURE EVALUATION
Anesthesia Evaluation     Patient summary reviewed and Nursing notes reviewed   NPO Solid Status: > 8 hours  NPO Liquid Status: > 2 hours           Airway   Mallampati: II  TM distance: >3 FB  Neck ROM: full  No difficulty expected  Dental - normal exam     Comment: Upper and lower crowns, none loose    Pulmonary     breath sounds clear to auscultation  Cardiovascular   Exercise tolerance: good (4-7 METS)    ECG reviewed  Rhythm: regular  Rate: normal    (+) hypertension, hyperlipidemia      Neuro/Psych  (+) headaches (Migraines), psychiatric history Anxiety and Depression  GI/Hepatic/Renal/Endo    (+) GERD well controlled    Musculoskeletal     (+) joint swelling  Abdominal    Substance History      OB/GYN          Other   arthritis,   history of cancer (Breast CA)    ROS/Med Hx Other: EKG on 5-10-23:  Sinus rhythm, HR 60  Low voltage, precordial leads  LVH by voltage  Nonspecific anterior T wave changes.  When compared with ECG of 28-Nov-2022 10:36:25,  Significant axis, voltage or hypertrophy change    Last dose GLP1 4/17/25    No sticks/BP left side                      Anesthesia Plan    ASA 3     general   total IV anesthesia  (Total IV Anesthesia    Patient understands anesthesia not responsible for dental damage.    Discussed risks with pt including aspiration, allergic reactions, apnea, advanced airway placement. Pt verbalized understanding. All questions answered.  )  intravenous induction     Anesthetic plan, risks, benefits, and alternatives have been provided, discussed and informed consent has been obtained with: patient.  Pre-procedure education provided  Plan discussed with CRNA.        CODE STATUS:

## 2025-04-24 NOTE — TELEPHONE ENCOUNTER
ENDO RECONCILIATION  Verify source of procedure(s): Nurse/MA screening    Referral Rec'd      TIME OUT-CONFIRM CORRECT PROCEDURE: Colonoscopy        Cardiology: No  Pulmonology: No  Blood thinner: No  GLP-1: Zepbound - Pt confirmed she has held this    Additional DX/indication for procedure: History of Colon Polyps    Please include any other notes relevant to endo reconciliation: Last Colonoscopy: 6.16.21

## 2025-04-25 ENCOUNTER — HOSPITAL ENCOUNTER (OUTPATIENT)
Facility: HOSPITAL | Age: 65
Setting detail: HOSPITAL OUTPATIENT SURGERY
Discharge: HOME | End: 2025-04-25
Attending: INTERNAL MEDICINE | Admitting: INTERNAL MEDICINE
Payer: COMMERCIAL

## 2025-04-25 ENCOUNTER — ANESTHESIA (OUTPATIENT)
Dept: GASTROENTEROLOGY | Facility: HOSPITAL | Age: 65
End: 2025-04-25
Payer: COMMERCIAL

## 2025-04-25 VITALS
DIASTOLIC BLOOD PRESSURE: 66 MMHG | HEART RATE: 72 BPM | TEMPERATURE: 97.3 F | OXYGEN SATURATION: 96 % | SYSTOLIC BLOOD PRESSURE: 95 MMHG | RESPIRATION RATE: 24 BRPM | BODY MASS INDEX: 32.46 KG/M2 | HEIGHT: 63 IN | WEIGHT: 183.2 LBS

## 2025-04-25 PROCEDURE — 25810000003 LACTATED RINGERS PER 1000 ML: Performed by: NURSE ANESTHETIST, CERTIFIED REGISTERED

## 2025-04-25 PROCEDURE — 25810000003 LACTATED RINGERS PER 1000 ML

## 2025-04-25 PROCEDURE — 25010000002 PROPOFOL 10 MG/ML EMULSION: Performed by: NURSE ANESTHETIST, CERTIFIED REGISTERED

## 2025-04-25 PROCEDURE — 25010000002 LIDOCAINE PF 2% 2 % SOLUTION: Performed by: NURSE ANESTHETIST, CERTIFIED REGISTERED

## 2025-04-25 RX ORDER — PROPOFOL 10 MG/ML
VIAL (ML) INTRAVENOUS AS NEEDED
Status: DISCONTINUED | OUTPATIENT
Start: 2025-04-25 | End: 2025-04-25 | Stop reason: SURG

## 2025-04-25 RX ORDER — SODIUM CHLORIDE, SODIUM LACTATE, POTASSIUM CHLORIDE, CALCIUM CHLORIDE 600; 310; 30; 20 MG/100ML; MG/100ML; MG/100ML; MG/100ML
30 INJECTION, SOLUTION INTRAVENOUS CONTINUOUS
Status: DISCONTINUED | OUTPATIENT
Start: 2025-04-25 | End: 2025-04-25 | Stop reason: HOSPADM

## 2025-04-25 RX ORDER — SODIUM CHLORIDE, SODIUM LACTATE, POTASSIUM CHLORIDE, CALCIUM CHLORIDE 600; 310; 30; 20 MG/100ML; MG/100ML; MG/100ML; MG/100ML
INJECTION, SOLUTION INTRAVENOUS CONTINUOUS PRN
Status: DISCONTINUED | OUTPATIENT
Start: 2025-04-25 | End: 2025-04-25 | Stop reason: SURG

## 2025-04-25 RX ORDER — LIDOCAINE HYDROCHLORIDE 20 MG/ML
INJECTION, SOLUTION EPIDURAL; INFILTRATION; INTRACAUDAL; PERINEURAL AS NEEDED
Status: DISCONTINUED | OUTPATIENT
Start: 2025-04-25 | End: 2025-04-25 | Stop reason: SURG

## 2025-04-25 RX ADMIN — SODIUM CHLORIDE, POTASSIUM CHLORIDE, SODIUM LACTATE AND CALCIUM CHLORIDE: 600; 310; 30; 20 INJECTION, SOLUTION INTRAVENOUS at 08:09

## 2025-04-25 RX ADMIN — PROPOFOL 100 MG: 10 INJECTION, EMULSION INTRAVENOUS at 08:12

## 2025-04-25 RX ADMIN — LIDOCAINE HYDROCHLORIDE 50 MG: 20 SOLUTION INTRAVENOUS at 08:12

## 2025-04-25 RX ADMIN — PROPOFOL 175 MCG/KG/MIN: 10 INJECTION, EMULSION INTRAVENOUS at 08:12

## 2025-04-25 RX ADMIN — SODIUM CHLORIDE, POTASSIUM CHLORIDE, SODIUM LACTATE AND CALCIUM CHLORIDE 30 ML/HR: 600; 310; 30; 20 INJECTION, SOLUTION INTRAVENOUS at 07:17

## 2025-04-25 NOTE — ANESTHESIA POSTPROCEDURE EVALUATION
Patient: Margy Jimenez    Procedure Summary       Date: 04/25/25 Room / Location: [facility] ENDOSCOPY 1 / [facility] ENDOSCOPY    Anesthesia Start: 0809 Anesthesia Stop: 0842    Procedure: COLONOSCOPY Diagnosis:       Encounter for screening for malignant neoplasm of colon      (Encounter for screening for malignant neoplasm of colon [Z12.11])    Surgeons: David Hernandez MD Provider: Erick Trujillo CRNA    Anesthesia Type: general ASA Status: 3            Anesthesia Type: general    Vitals  Vitals Value Taken Time   /57 04/25/25 08:55   Temp 36.3 °C (97.3 °F) 04/25/25 08:40   Pulse 71 04/25/25 08:59   Resp 24 04/25/25 08:50   SpO2 97 % 04/25/25 08:59   Vitals shown include unfiled device data.        Post Anesthesia Care and Evaluation    Patient location during evaluation: bedside  Patient participation: complete - patient participated  Level of consciousness: awake  Pain management: adequate    Airway patency: patent  Anesthetic complications: No anesthetic complications  PONV Status: controlled  Cardiovascular status: acceptable and stable  Respiratory status: acceptable

## 2025-05-01 ENCOUNTER — TELEPHONE (OUTPATIENT)
Dept: GASTROENTEROLOGY | Facility: CLINIC | Age: 65
End: 2025-05-01
Payer: COMMERCIAL

## 2025-05-14 ENCOUNTER — OFFICE VISIT (OUTPATIENT)
Dept: FAMILY MEDICINE CLINIC | Facility: CLINIC | Age: 65
End: 2025-05-14
Payer: COMMERCIAL

## 2025-05-14 VITALS
DIASTOLIC BLOOD PRESSURE: 68 MMHG | HEART RATE: 78 BPM | WEIGHT: 183.6 LBS | BODY MASS INDEX: 32.53 KG/M2 | HEIGHT: 63 IN | SYSTOLIC BLOOD PRESSURE: 102 MMHG | OXYGEN SATURATION: 95 %

## 2025-05-14 DIAGNOSIS — E78.2 MIXED HYPERLIPIDEMIA: ICD-10-CM

## 2025-05-14 DIAGNOSIS — E66.09 CLASS 1 OBESITY DUE TO EXCESS CALORIES WITH SERIOUS COMORBIDITY AND BODY MASS INDEX (BMI) OF 32.0 TO 32.9 IN ADULT: Primary | ICD-10-CM

## 2025-05-14 DIAGNOSIS — E66.811 CLASS 1 OBESITY DUE TO EXCESS CALORIES WITH SERIOUS COMORBIDITY AND BODY MASS INDEX (BMI) OF 32.0 TO 32.9 IN ADULT: Primary | ICD-10-CM

## 2025-05-14 LAB
CHOLEST SERPL-MCNC: 150 MG/DL (ref 0–200)
HDLC SERPL-MCNC: 45 MG/DL (ref 40–60)
LDLC SERPL CALC-MCNC: 85 MG/DL (ref 0–100)
LDLC/HDLC SERPL: 1.84 {RATIO}
TRIGL SERPL-MCNC: 110 MG/DL (ref 0–150)
VLDLC SERPL-MCNC: 20 MG/DL (ref 5–40)

## 2025-05-14 PROCEDURE — 80061 LIPID PANEL: CPT

## 2025-05-14 PROCEDURE — 99213 OFFICE O/P EST LOW 20 MIN: CPT

## 2025-05-14 RX ORDER — TIRZEPATIDE 10 MG/.5ML
10 INJECTION, SOLUTION SUBCUTANEOUS WEEKLY
COMMUNITY
Start: 2025-04-17 | End: 2025-05-14 | Stop reason: DRUGHIGH

## 2025-05-14 NOTE — PROGRESS NOTES
Chief Complaint  Weight Check    SUBJECTIVE  Margy Jimenez presents to [facility]    History of Present Illness  Patient presents today for follow-up on weight loss medication.  Currently on Zepbound 10 mg.  Since last appt she has lost 6.5 lbs. Denies any adverse effects of medication. Wishes to continue with medication at this time and increase to next dose. She has been eating smaller portions, healthier options and walking.     She is due to repeat lipid panel since increasing statin dose. Will be drawn in office today.     Past Medical History:   Diagnosis Date    Bladder atonia     LAZY BLADDER    Cancer     LEFT BREAST. NO BP OR NEEDLE STICKS IN LEFT ARM.  DID NOT REQUIRE CHEMO OR RADIATION    Depression     GERD (gastroesophageal reflux disease)     High blood pressure     Hyperlipidemia     Neoplasm of left breast     S/P bilateral mastectomy 12/08/2021    NO BP OR NEEDLE STICKS LEFT ARM    Status post bilateral breast reconstruction with implant placement 12/08/2021      Family History   Problem Relation Age of Onset    Diabetes Mother     Diabetes Other     Colon cancer Neg Hx     Malig Hyperthermia Neg Hx       Past Surgical History:   Procedure Laterality Date    ABDOMINAL HYSTERECTOMY      BREAST AUGMENTATION Bilateral 12/07/2021    Procedure: BILATERAL BREAST RECONSTRUCTION WITH IMPLANTS, IMPLANT OF BIOLOGICAL MESH,  USE OF SPY, AND BILATERAL CHEST LIPOSUCTION;  Surgeon: Neena Pires MD;  [place] [facility] [place] [facility] [place]  Service: Plastics;  Laterality: Bilateral;    BREAST BIOPSY Left 10/26/2021    Procedure: LEFT BREAST LUMPECTOMY WITH SENTINEL NODE BIOPSY AND NEEDLE LOCALIZATION;  Surgeon: Ayah Chaparro MD;  Location: [facility] OR;  Service: General;  Laterality: Left;    BREAST RECONSTRUCTION Bilateral 04/27/2022    Procedure: BREAST RECONSTRUCTION REVISION  with bilateral skin resection, bilateral nipple reconstruction.;  Surgeon: Pranay  Neena JOHNSON MD;  [place]: [facility] [place] [facility] [place];  Service: Plastics;  Laterality: Bilateral;    BREAST RECONSTRUCTION Bilateral 12/06/2022    Procedure: BREAST RECONSTRUCTION REVISION;  Surgeon: Neena Pires MD;  Location: [facility] [place];  Service: Plastics;  Laterality: Bilateral;    BREAST TISSUE EXPANDER REMOVAL INSERTION OF IMPLANT Right 1/4/2023    Procedure: BREAST IMPLANT REMOVAL;  Surgeon: Jordan Pandey MD;  Location: [facility] [place];  Service: Plastics;  Laterality: Right;    BREAST TISSUE EXPANDER REMOVAL INSERTION OF IMPLANT Right 5/10/2023    Procedure: BREAST TISSUE EXPANDER  INSERTION WITH PLACEMENT OF MESH;  Surgeon: Jordan Pandey MD;  Location: [facility] [place];  Service: Plastics;  Laterality: Right;    BUNIONECTOMY Left     COLONOSCOPY      aprrox 2018     COLONOSCOPY N/A 08/31/2021    Procedure: COLONOSCOPY WITH INJECTION ORISE/POLYPECTOMY HOT SNARE/ENDO CLIP X3;  Surgeon: David Hernandez MD;  Location: [facility] ENDOSCOPY;  Service: Gastroenterology;  Laterality: N/A;  COLON POLYP    COLONOSCOPY N/A 4/25/2025    Procedure: COLONOSCOPY;  Surgeon: David Hernandez MD;  Location: [facility] ENDOSCOPY;  Service: Gastroenterology;  Laterality: N/A;  NORMAL    CYSTOSCOPY      ENDOSCOPY      ENDOSCOPY N/A 08/31/2021    Procedure: ESOPHAGOGASTRODUODENOSCOPY WITH BIOPSY;  Surgeon: David Hernandez MD;  Location: [facility] ENDOSCOPY;  Service: Gastroenterology;  Laterality: N/A;  GASTRIC POLYPS/REFLUX ESOPHAGITIS    INCISION AND DRAINAGE TRUNK Right 5/27/2023    Procedure: INCISION AND DRAINAGE OF RIGHT BREAST;  Surgeon: Jordan Pandey MD;  Location: [facility] MAIN OR;  Service: Plastics;  Laterality: Right;    LAPAROSCOPIC CHOLECYSTECTOMY      MASTECTOMY Bilateral 12/07/2021    Procedure: MASTECTOMY;  Surgeon: Ayah Chaparro MD;  Location: [facility] [place] [facility] [place];  Service: General;  Laterality: Bilateral;    ROTATOR CUFF REPAIR Right         Current Outpatient Medications:      "amLODIPine (NORVASC) 2.5 MG tablet, Take 1 tablet by mouth Every Night., Disp: , Rfl:     anastrozole (ARIMIDEX) 1 MG tablet, Take 1 tablet by mouth Daily., Disp: 90 tablet, Rfl: 1    atorvastatin (LIPITOR) 20 MG tablet, TAKE 1 TABLET BY MOUTH EVERY DAY AT NIGHT, Disp: 90 tablet, Rfl: 0    baclofen (LIORESAL) 10 MG tablet, Take 1 tablet by mouth Every Night. (Patient taking differently: Take 1 tablet by mouth Every Night. Pt has been taking tid), Disp: , Rfl:     busPIRone (BUSPAR) 10 MG tablet, Take 1 tablet by mouth Every Night., Disp: , Rfl:     cetirizine (zyrTEC) 10 MG tablet, Take 1 tablet by mouth., Disp: , Rfl:     dexlansoprazole (DEXILANT) 60 MG capsule, Take 1 capsule by mouth Daily., Disp: 90 capsule, Rfl: 1    escitalopram (LEXAPRO) 20 MG tablet, Take 1 tablet by mouth Every Night., Disp: 30 tablet, Rfl: 0    hydrOXYzine (ATARAX) 25 MG tablet, Take 1 tablet by mouth At Night As Needed. (Patient taking differently: Take 1 tablet by mouth At Night As Needed for Anxiety. Pt takes 2 at night), Disp: , Rfl:     lisinopril-hydrochlorothiazide (PRINZIDE,ZESTORETIC) 20-12.5 MG per tablet, Take 1 tablet by mouth Every Night., Disp: 30 tablet, Rfl: 0    mirtazapine (REMERON) 15 MG tablet, Take 1 tablet by mouth every night at bedtime., Disp: 30 tablet, Rfl: 0    tamsulosin (FLOMAX) 0.4 MG capsule 24 hr capsule, Take 1 capsule by mouth Every Night., Disp: , Rfl:     vitamin D (ERGOCALCIFEROL) 1.25 MG (35413 UT) capsule capsule, Take 1 capsule by mouth 1 (One) Time Per Week., Disp: 12 capsule, Rfl: 2    Tirzepatide-Weight Management (ZEPBOUND) 12.5 MG/0.5ML solution auto-injector, Inject 0.5 mL under the skin into the appropriate area as directed 1 (One) Time Per Week for 4 doses., Disp: 2 mL, Rfl: 0    OBJECTIVE  Vital Signs:   /68 (BP Location: Left arm, Patient Position: Sitting, Cuff Size: Adult)   Pulse 78   Ht 160 cm (62.99\")   Wt 83.3 kg (183 lb 9.6 oz)   SpO2 95%   BMI 32.53 kg/m²    Estimated " "body mass index is 32.53 kg/m² as calculated from the following:    Height as of this encounter: 160 cm (62.99\").    Weight as of this encounter: 83.3 kg (183 lb 9.6 oz).     Wt Readings from Last 3 Encounters:   05/14/25 83.3 kg (183 lb 9.6 oz)   04/25/25 83.1 kg (183 lb 3.2 oz)   04/16/25 86.2 kg (190 lb)     BP Readings from Last 3 Encounters:   05/14/25 102/68   04/25/25 95/66   04/16/25 118/79       Physical Exam  Vitals reviewed.   Constitutional:       General: She is not in acute distress.     Appearance: She is well-groomed. She is obese. She is not ill-appearing.   HENT:      Head: Normocephalic and atraumatic.   Eyes:      Conjunctiva/sclera: Conjunctivae normal.   Cardiovascular:      Rate and Rhythm: Normal rate and regular rhythm.      Heart sounds: Normal heart sounds.   Pulmonary:      Effort: Pulmonary effort is normal.      Breath sounds: Normal breath sounds.   Musculoskeletal:      Cervical back: Normal range of motion.   Neurological:      Mental Status: She is alert and oriented to person, place, and time.   Psychiatric:         Mood and Affect: Mood normal.         Behavior: Behavior normal.         Thought Content: Thought content normal.         Judgment: Judgment normal.          Result Review    Common labs          8/15/2024    09:20 1/15/2025    15:59   Common Labs   Glucose  93    Glucose 94        BUN 17     15    Creatinine 0.8     0.87    Sodium 142     140    Potassium 3.6     4.0    Chloride 102     101    Calcium 9.4     9.4    Albumin  4.6    Total Bilirubin  0.2    Alkaline Phosphatase  121    AST (SGOT)  26    ALT (SGPT)  23    WBC  5.12    Hemoglobin  13.1    Hematocrit  39.3    Platelets  288    Total Cholesterol  222    Triglycerides  124    HDL Cholesterol  53    LDL Cholesterol   147       Details          This result is from an external source.               No Images in the past 120 days found..      The above data has been reviewed by CECI Zhu 05/14/2025 " 07:04 EDT.          Patient Care Team:  Krissy Corbett APRN as PCP - General (Nurse Practitioner)  Mariela Watson MD PhD as Consulting Physician (Hematology and Oncology)  Ayah Chaparro MD as Consulting Physician (General Surgery)  Veronica Morin APRN as Nurse Practitioner (Nurse Practitioner)  Hoda Espinoza APRN as Nurse Practitioner (Plastic Surgery)            ASSESSMENT & PLAN    Diagnoses and all orders for this visit:    1. Class 1 obesity due to excess calories with serious comorbidity and body mass index (BMI) of 32.0 to 32.9 in adult (Primary)  Comments:  progressing well with Zepbound, increase to the next dose of 12.5 mg, pair with diet and exercise, 1 month follow up  Orders:  -     Tirzepatide-Weight Management (ZEPBOUND) 12.5 MG/0.5ML solution auto-injector; Inject 0.5 mL under the skin into the appropriate area as directed 1 (One) Time Per Week for 4 doses.  Dispense: 2 mL; Refill: 0         Tobacco Use: Low Risk  (5/14/2025)    Patient History     Smoking Tobacco Use: Never     Smokeless Tobacco Use: Never     Passive Exposure: Never       Follow Up     Return in about 1 month (around 6/14/2025) for Next scheduled follow up, weight check.      Patient was given instructions and counseling regarding her condition or for health maintenance advice. Please see specific information pulled into the AVS if appropriate.   I have reviewed information obtained and documented by others and I have confirmed the accuracy of this documented note.    CECI Zhu

## 2025-06-03 ENCOUNTER — OFFICE VISIT (OUTPATIENT)
Dept: FAMILY MEDICINE CLINIC | Facility: CLINIC | Age: 65
End: 2025-06-03
Payer: COMMERCIAL

## 2025-06-03 VITALS
DIASTOLIC BLOOD PRESSURE: 78 MMHG | WEIGHT: 178.4 LBS | HEIGHT: 63 IN | OXYGEN SATURATION: 100 % | SYSTOLIC BLOOD PRESSURE: 117 MMHG | HEART RATE: 81 BPM | BODY MASS INDEX: 31.61 KG/M2

## 2025-06-03 DIAGNOSIS — J06.9 UPPER RESPIRATORY TRACT INFECTION, UNSPECIFIED TYPE: ICD-10-CM

## 2025-06-03 DIAGNOSIS — E66.811 CLASS 1 OBESITY DUE TO EXCESS CALORIES WITH SERIOUS COMORBIDITY AND BODY MASS INDEX (BMI) OF 31.0 TO 31.9 IN ADULT: ICD-10-CM

## 2025-06-03 DIAGNOSIS — R05.1 ACUTE COUGH: Primary | ICD-10-CM

## 2025-06-03 DIAGNOSIS — J22 LOWER RESPIRATORY INFECTION (E.G., BRONCHITIS, PNEUMONIA, PNEUMONITIS, PULMONITIS): ICD-10-CM

## 2025-06-03 DIAGNOSIS — E66.09 CLASS 1 OBESITY DUE TO EXCESS CALORIES WITH SERIOUS COMORBIDITY AND BODY MASS INDEX (BMI) OF 31.0 TO 31.9 IN ADULT: ICD-10-CM

## 2025-06-03 LAB
EXPIRATION DATE: NORMAL
FLUAV AG UPPER RESP QL IA.RAPID: NOT DETECTED
FLUBV AG UPPER RESP QL IA.RAPID: NOT DETECTED
INTERNAL CONTROL: NORMAL
Lab: NORMAL
SARS-COV-2 AG UPPER RESP QL IA.RAPID: NOT DETECTED

## 2025-06-03 RX ORDER — TRIAMCINOLONE ACETONIDE 40 MG/ML
40 INJECTION, SUSPENSION INTRA-ARTICULAR; INTRAMUSCULAR ONCE
Status: SHIPPED | OUTPATIENT
Start: 2025-06-03

## 2025-06-03 RX ORDER — DEXTROMETHORPHAN HYDROBROMIDE AND PROMETHAZINE HYDROCHLORIDE 15; 6.25 MG/5ML; MG/5ML
5 SYRUP ORAL 4 TIMES DAILY PRN
Qty: 118 ML | Refills: 0 | Status: SHIPPED | OUTPATIENT
Start: 2025-06-03

## 2025-06-03 RX ORDER — BROMPHENIRAMINE MALEATE, PSEUDOEPHEDRINE HYDROCHLORIDE, AND DEXTROMETHORPHAN HYDROBROMIDE 2; 30; 10 MG/5ML; MG/5ML; MG/5ML
5 SYRUP ORAL 4 TIMES DAILY PRN
Qty: 473 ML | Refills: 0 | Status: SHIPPED | OUTPATIENT
Start: 2025-06-03 | End: 2025-06-03 | Stop reason: SINTOL

## 2025-06-03 NOTE — PROGRESS NOTES
Chief Complaint  Cough and Nasal Congestion    SUBJECTIVE  Margy Jimenez presents to [facility]    History of Present Illness  Patient is 65-year-old female who presents today for cough, sinus pressure, nasal congestion, chills, cold sweats since Saturday. She said that she is lightheaded after having a hard coughing spell. Otherwise no SOA/wheezing.     Patient also presents today for follow-up on weight loss medication.  Zepbound 12.5 mg.  Since last appt she has lost an additional 5 lbs. Denies any adverse effects of medication Wishes to continue with medication at this time and increase tot he next dose.       Past Medical History:   Diagnosis Date    Bladder atonia     LAZY BLADDER    Cancer     LEFT BREAST. NO BP OR NEEDLE STICKS IN LEFT ARM.  DID NOT REQUIRE CHEMO OR RADIATION    Depression     GERD (gastroesophageal reflux disease)     High blood pressure     Hyperlipidemia     Neoplasm of left breast     S/P bilateral mastectomy 12/08/2021    NO BP OR NEEDLE STICKS LEFT ARM    Status post bilateral breast reconstruction with implant placement 12/08/2021      Family History   Problem Relation Age of Onset    Diabetes Mother     Diabetes Other     Colon cancer Neg Hx     Malig Hyperthermia Neg Hx       Past Surgical History:   Procedure Laterality Date    ABDOMINAL HYSTERECTOMY      BREAST AUGMENTATION Bilateral 12/07/2021    Procedure: BILATERAL BREAST RECONSTRUCTION WITH IMPLANTS, IMPLANT OF BIOLOGICAL MESH,  USE OF SPY, AND BILATERAL CHEST LIPOSUCTION;  Surgeon: Neena Pires MD;  Location: [facility] OR [facility];  Service: Plastics;  Laterality: Bilateral;    BREAST BIOPSY Left 10/26/2021    Procedure: LEFT BREAST LUMPECTOMY WITH SENTINEL NODE BIOPSY AND NEEDLE LOCALIZATION;  Surgeon: Ayah Chaparro MD;  Location: [facility] MAIN OR;  Service: General;  Laterality: Left;    BREAST RECONSTRUCTION Bilateral 04/27/2022    Procedure: BREAST RECONSTRUCTION REVISION   with bilateral skin resection, bilateral nipple reconstruction.;  Surgeon: Neena Pires MD;  Location: [facility] OR [facility] [place];  Service: Plastics;  Laterality: Bilateral;    BREAST RECONSTRUCTION Bilateral 12/06/2022    Procedure: BREAST RECONSTRUCTION REVISION;  Surgeon: Neena Pires MD;  Location: [facility] OR [facility] [place];  Service: Plastics;  Laterality: Bilateral;    BREAST TISSUE EXPANDER REMOVAL INSERTION OF IMPLANT Right 1/4/2023    Procedure: BREAST IMPLANT REMOVAL;  Surgeon: Jordan Pandey MD;  Location: [facility] OR [facility] [place];  Service: Plastics;  Laterality: Right;    BREAST TISSUE EXPANDER REMOVAL INSERTION OF IMPLANT Right 5/10/2023    Procedure: BREAST TISSUE EXPANDER  INSERTION WITH PLACEMENT OF MESH;  Surgeon: Jordan Pandey MD;  Location: [facility] OR [facility] [place];  Service: Plastics;  Laterality: Right;    BUNIONECTOMY Left     COLONOSCOPY      aprrox 2018     COLONOSCOPY N/A 08/31/2021    Procedure: COLONOSCOPY WITH INJECTION ORISE/POLYPECTOMY HOT SNARE/ENDO CLIP X3;  Surgeon: David Hernandez MD;  Location: [facility] ENDOSCOPY;  Service: Gastroenterology;  Laterality: N/A;  COLON POLYP    COLONOSCOPY N/A 4/25/2025    Procedure: COLONOSCOPY;  Surgeon: David Hernandez MD;  Location: [facility] ENDOSCOPY;  Service: Gastroenterology;  Laterality: N/A;  NORMAL    CYSTOSCOPY      ENDOSCOPY      ENDOSCOPY N/A 08/31/2021    Procedure: ESOPHAGOGASTRODUODENOSCOPY WITH BIOPSY;  Surgeon: David Hernandez MD;  Location: [facility] ENDOSCOPY;  Service: Gastroenterology;  Laterality: N/A;  GASTRIC POLYPS/REFLUX ESOPHAGITIS    INCISION AND DRAINAGE TRUNK Right 5/27/2023    Procedure: INCISION AND DRAINAGE OF RIGHT BREAST;  Surgeon: Jordan Pandey MD;  Location: [facility] MAIN OR;  Service: Plastics;  Laterality: Right;    LAPAROSCOPIC CHOLECYSTECTOMY      MASTECTOMY Bilateral 12/07/2021    Procedure: MASTECTOMY;  Surgeon: Ayah Chaparro MD;  Location: [facility] OR [facility] [place];  Service: General;   Laterality: Bilateral;    ROTATOR CUFF REPAIR Right         Current Outpatient Medications:     amLODIPine (NORVASC) 2.5 MG tablet, Take 1 tablet by mouth Every Night., Disp: , Rfl:     anastrozole (ARIMIDEX) 1 MG tablet, Take 1 tablet by mouth Daily., Disp: 90 tablet, Rfl: 1    atorvastatin (LIPITOR) 20 MG tablet, TAKE 1 TABLET BY MOUTH EVERY DAY AT NIGHT, Disp: 90 tablet, Rfl: 0    busPIRone (BUSPAR) 10 MG tablet, Take 1 tablet by mouth Every Night., Disp: , Rfl:     cetirizine (zyrTEC) 10 MG tablet, Take 1 tablet by mouth., Disp: , Rfl:     dexlansoprazole (DEXILANT) 60 MG capsule, Take 1 capsule by mouth Daily., Disp: 90 capsule, Rfl: 1    escitalopram (LEXAPRO) 20 MG tablet, Take 1 tablet by mouth Every Night., Disp: 30 tablet, Rfl: 0    lisinopril-hydrochlorothiazide (PRINZIDE,ZESTORETIC) 20-12.5 MG per tablet, Take 1 tablet by mouth Every Night., Disp: 30 tablet, Rfl: 0    mirtazapine (REMERON) 15 MG tablet, Take 1 tablet by mouth every night at bedtime., Disp: 30 tablet, Rfl: 0    tamsulosin (FLOMAX) 0.4 MG capsule 24 hr capsule, Take 1 capsule by mouth Every Night., Disp: , Rfl:     vitamin D (ERGOCALCIFEROL) 1.25 MG (23333 UT) capsule capsule, Take 1 capsule by mouth 1 (One) Time Per Week., Disp: 12 capsule, Rfl: 2    amoxicillin-clavulanate (AUGMENTIN) 875-125 MG per tablet, Take 1 tablet by mouth 2 (Two) Times a Day., Disp: 20 tablet, Rfl: 0    promethazine-dextromethorphan (PROMETHAZINE-DM) 6.25-15 MG/5ML syrup, Take 5 mL by mouth 4 (Four) Times a Day As Needed for Cough., Disp: 118 mL, Rfl: 0    Tirzepatide-Weight Management (ZEPBOUND) 15 MG/0.5ML solution auto-injector, Inject 0.5 mL under the skin into the appropriate area as directed 1 (One) Time Per Week for 4 doses., Disp: 2 mL, Rfl: 0    Current Facility-Administered Medications:     triamcinolone acetonide (KENALOG-40) injection 40 mg, 40 mg, Intramuscular, Once, Krissy Corbett APRN    OBJECTIVE  Vital Signs:   /78 (BP Location: Left arm,  "Patient Position: Sitting, Cuff Size: Large Adult)   Pulse 81   Ht 160 cm (62.99\")   Wt 80.9 kg (178 lb 6.4 oz)   SpO2 100%   BMI 31.61 kg/m²    Estimated body mass index is 31.61 kg/m² as calculated from the following:    Height as of this encounter: 160 cm (62.99\").    Weight as of this encounter: 80.9 kg (178 lb 6.4 oz).     Wt Readings from Last 3 Encounters:   06/03/25 80.9 kg (178 lb 6.4 oz)   05/14/25 83.3 kg (183 lb 9.6 oz)   04/25/25 83.1 kg (183 lb 3.2 oz)     BP Readings from Last 3 Encounters:   06/03/25 117/78   05/14/25 102/68   04/25/25 95/66       Physical Exam  Vitals reviewed.   Constitutional:       General: She is not in acute distress.     Appearance: She is ill-appearing.   HENT:      Head: Normocephalic and atraumatic.      Right Ear: Tympanic membrane, ear canal and external ear normal.      Left Ear: Tympanic membrane, ear canal and external ear normal.      Nose: Congestion present.      Right Sinus: Maxillary sinus tenderness and frontal sinus tenderness present.      Left Sinus: Maxillary sinus tenderness and frontal sinus tenderness present.      Mouth/Throat:      Mouth: Mucous membranes are moist.      Pharynx: Oropharynx is clear. No oropharyngeal exudate or posterior oropharyngeal erythema.   Eyes:      Conjunctiva/sclera: Conjunctivae normal.   Cardiovascular:      Rate and Rhythm: Normal rate and regular rhythm.      Heart sounds: Normal heart sounds.   Pulmonary:      Effort: Pulmonary effort is normal.      Breath sounds: Normal breath sounds.      Comments: Barking cough  Musculoskeletal:      Cervical back: Normal range of motion.   Skin:     General: Skin is warm and dry.   Neurological:      Mental Status: She is alert and oriented to person, place, and time.   Psychiatric:         Mood and Affect: Mood normal.         Behavior: Behavior normal.         Thought Content: Thought content normal.         Judgment: Judgment normal.          Result Review    Common labs  " "        8/15/2024    09:20 1/15/2025    15:59 5/14/2025    07:44   Common Labs   Glucose  93     Glucose 94         BUN 17     15     Creatinine 0.8     0.87     Sodium 142     140     Potassium 3.6     4.0     Chloride 102     101     Calcium 9.4     9.4     Albumin  4.6     Total Bilirubin  0.2     Alkaline Phosphatase  121     AST (SGOT)  26     ALT (SGPT)  23     WBC  5.12     Hemoglobin  13.1     Hematocrit  39.3     Platelets  288     Total Cholesterol  222  150    Triglycerides  124  110    HDL Cholesterol  53  45    LDL Cholesterol   147  85       Details          This result is from an external source.             No results found for: \"SARSANTIGEN\", \"FLUAAG\", \"FLUBAG\", \"INTCT\", \"LOTNUMBER\", \"EXPIRATDTE\"    No Images in the past 120 days found..      The above data has been reviewed by CECI Zhu 06/03/2025 08:27 EDT.          Patient Care Team:  Krissy Corbett APRN as PCP - General (Nurse Practitioner)  Mariela Watson MD PhD as Consulting Physician (Hematology and Oncology)  Ayah Chaparro MD as Consulting Physician (General Surgery)  Veronica Morin APRN as Nurse Practitioner (Nurse Practitioner)  Hoda Espinoza APRN as Nurse Practitioner (Plastic Surgery)            ASSESSMENT & PLAN    Diagnoses and all orders for this visit:    1. Acute cough (Primary)  Comments:  COVID and flu negative  Orders:  -     Discontinue: brompheniramine-pseudoephedrine-DM 30-2-10 MG/5ML syrup; Take 5 mL by mouth 4 (Four) Times a Day As Needed for Congestion or Cough.  Dispense: 473 mL; Refill: 0  -     promethazine-dextromethorphan (PROMETHAZINE-DM) 6.25-15 MG/5ML syrup; Take 5 mL by mouth 4 (Four) Times a Day As Needed for Cough.  Dispense: 118 mL; Refill: 0  -     POCT SARS-CoV-2 Antigen STEPHANIE + Flu    2. Lower respiratory infection (e.g., bronchitis, pneumonia, pneumonitis, pulmonitis)  Comments:  start augmentin and promethazine DM. kenalog injection given in office.  Orders:  -     " amoxicillin-clavulanate (AUGMENTIN) 875-125 MG per tablet; Take 1 tablet by mouth 2 (Two) Times a Day.  Dispense: 20 tablet; Refill: 0  -     triamcinolone acetonide (KENALOG-40) injection 40 mg  -     promethazine-dextromethorphan (PROMETHAZINE-DM) 6.25-15 MG/5ML syrup; Take 5 mL by mouth 4 (Four) Times a Day As Needed for Cough.  Dispense: 118 mL; Refill: 0    3. Upper respiratory tract infection, unspecified type  Comments:  cannot tolerate sudafed, continue zyrtec  Orders:  -     Discontinue: brompheniramine-pseudoephedrine-DM 30-2-10 MG/5ML syrup; Take 5 mL by mouth 4 (Four) Times a Day As Needed for Congestion or Cough.  Dispense: 473 mL; Refill: 0    4. Class 1 obesity due to excess calories with serious comorbidity and body mass index (BMI) of 31.0 to 31.9 in adult  Comments:  improving, increase zepbound to 15 mg dose for 4 weeks with follow up. due to insurnace will change over to wegovy next month.  Orders:  -     Tirzepatide-Weight Management (ZEPBOUND) 15 MG/0.5ML solution auto-injector; Inject 0.5 mL under the skin into the appropriate area as directed 1 (One) Time Per Week for 4 doses.  Dispense: 2 mL; Refill: 0         Tobacco Use: Low Risk  (6/3/2025)    Patient History     Smoking Tobacco Use: Never     Smokeless Tobacco Use: Never     Passive Exposure: Never       Follow Up     Return in about 1 month (around 7/3/2025) for Next scheduled follow up.      Patient was given instructions and counseling regarding her condition or for health maintenance advice. Please see specific information pulled into the AVS if appropriate.   I have reviewed information obtained and documented by others and I have confirmed the accuracy of this documented note.    CECI Zhu

## 2025-07-03 ENCOUNTER — OFFICE VISIT (OUTPATIENT)
Dept: FAMILY MEDICINE CLINIC | Facility: CLINIC | Age: 65
End: 2025-07-03
Payer: COMMERCIAL

## 2025-07-03 VITALS
BODY MASS INDEX: 30.94 KG/M2 | DIASTOLIC BLOOD PRESSURE: 73 MMHG | OXYGEN SATURATION: 99 % | HEIGHT: 63 IN | WEIGHT: 174.6 LBS | HEART RATE: 72 BPM | SYSTOLIC BLOOD PRESSURE: 114 MMHG

## 2025-07-03 DIAGNOSIS — G25.81 RLS (RESTLESS LEGS SYNDROME): ICD-10-CM

## 2025-07-03 DIAGNOSIS — N31.2 BLADDER ATONIA: ICD-10-CM

## 2025-07-03 DIAGNOSIS — E78.2 MIXED HYPERLIPIDEMIA: ICD-10-CM

## 2025-07-03 DIAGNOSIS — I10 HYPERTENSION, UNSPECIFIED TYPE: ICD-10-CM

## 2025-07-03 DIAGNOSIS — E66.09 CLASS 1 OBESITY DUE TO EXCESS CALORIES WITH SERIOUS COMORBIDITY AND BODY MASS INDEX (BMI) OF 31.0 TO 31.9 IN ADULT: Primary | ICD-10-CM

## 2025-07-03 DIAGNOSIS — F41.9 ANXIETY: ICD-10-CM

## 2025-07-03 DIAGNOSIS — K21.9 GASTROESOPHAGEAL REFLUX DISEASE, UNSPECIFIED WHETHER ESOPHAGITIS PRESENT: ICD-10-CM

## 2025-07-03 DIAGNOSIS — E66.811 CLASS 1 OBESITY DUE TO EXCESS CALORIES WITH SERIOUS COMORBIDITY AND BODY MASS INDEX (BMI) OF 31.0 TO 31.9 IN ADULT: Primary | ICD-10-CM

## 2025-07-03 DIAGNOSIS — F32.A DEPRESSION, UNSPECIFIED DEPRESSION TYPE: ICD-10-CM

## 2025-07-03 PROCEDURE — 99214 OFFICE O/P EST MOD 30 MIN: CPT

## 2025-07-03 RX ORDER — SEMAGLUTIDE 2.4 MG/.75ML
2.4 INJECTION, SOLUTION SUBCUTANEOUS WEEKLY
Qty: 3 ML | Refills: 2 | Status: SHIPPED | OUTPATIENT
Start: 2025-07-03 | End: 2025-09-19

## 2025-07-03 NOTE — PROGRESS NOTES
Chief Complaint  Anxiety, Depression, Hypertension, Hyperlipidemia, Obesity, and Restless Legs Syndrome    SUBJECTIVE  Margy Jimenez presents to [facility]    History of Present Illness  Patient is a 65-year-old female who presents today for 6 month follow up on chronic conditions: htn, hld, insomnia, RLS, anxiety, depression, bladder atonia, GERD.      Hypertension- currently taking amlodipine 2.5 mg and zestoretic 20-12.5 mg.  Patient blood pressure in office today is reading 114/73. Denies any adverse effects medication.  Denies any chest pain, shortness of breath, headache, head pressure.      Hyperlipidemia-patient had updated lipid panel 5/28/25 which was well controlled, LDL at 85.   Patient is currently on Lipitor 20 mg.  Denies any adverse effects medication.     Anxiety and depression-patient currently taking BuSpar 10 mg Lexapro 20 mg.  Mood is well-controlled.  Denies any adverse effects of medication.  Denies any SI or self-harm     Restless leg syndrome-patient is currently taking Remeron 50 mg nightly.  Patient reports this greatly reduces her symptoms and she is able to sleep.     Bladder atonia-patient currently on tamsulosin 0.4 mg daily.   Denies any issues with medication.  She is unable to urinate regularly with this.     Patient has a history of left breast cancer. She has had a bilateral mastectomy with reconstructive surgery.  Patient is currently established with Dr. Shen with oncology.  Patient is currently on anastrozole 1 mg therapy.       GERD-patient is currently taking Dexilant 60 mg daily.  Patient reports that controls her reflux symptoms well denies any adverse effects of medication.     Patient also presents today for follow-up on weight loss medication.  Zepbound 15 mg.  Since last appt she has lost an additional 4 lbs. Denies any adverse effects of medication Wishes to continue with medication. Due to insurance will have to change over  to Wegovy.      Labs in place for the end of this month by oncology.      No other concerns or complaints at this time.      Past Medical History:   Diagnosis Date    Bladder atonia     LAZY BLADDER    Cancer     LEFT BREAST. NO BP OR NEEDLE STICKS IN LEFT ARM.  DID NOT REQUIRE CHEMO OR RADIATION    Depression     GERD (gastroesophageal reflux disease)     High blood pressure     Hyperlipidemia     Neoplasm of left breast     S/P bilateral mastectomy 12/08/2021    NO BP OR NEEDLE STICKS LEFT ARM    Status post bilateral breast reconstruction with implant placement 12/08/2021      Family History   Problem Relation Age of Onset    Diabetes Mother     Diabetes Other     Colon cancer Neg Hx     Malig Hyperthermia Neg Hx       Past Surgical History:   Procedure Laterality Date    ABDOMINAL HYSTERECTOMY      BREAST AUGMENTATION Bilateral 12/07/2021    Procedure: BILATERAL BREAST RECONSTRUCTION WITH IMPLANTS, IMPLANT OF BIOLOGICAL MESH,  USE OF SPY, AND BILATERAL CHEST LIPOSUCTION;  Surgeon: Neena Pires MD;  Location: [facility] [place];  Service: Plastics;  Laterality: Bilateral;    BREAST BIOPSY Left 10/26/2021    Procedure: LEFT BREAST LUMPECTOMY WITH SENTINEL NODE BIOPSY AND NEEDLE LOCALIZATION;  Surgeon: Ayah Chaparro MD;  Location: [facility] MAIN OR;  Service: General;  Laterality: Left;    BREAST RECONSTRUCTION Bilateral 04/27/2022    Procedure: BREAST RECONSTRUCTION REVISION  with bilateral skin resection, bilateral nipple reconstruction.;  Surgeon: Neena Pires MD;  Location: [facility] [place];  Service: Plastics;  Laterality: Bilateral;    BREAST RECONSTRUCTION Bilateral 12/06/2022    Procedure: BREAST RECONSTRUCTION REVISION;  Surgeon: Neena Pires MD;  Location: [facility];  Service: Plastics;  Laterality: Bilateral;    BREAST TISSUE EXPANDER REMOVAL INSERTION OF IMPLANT Right 1/4/2023    Procedure: BREAST IMPLANT REMOVAL;  Surgeon: Jordan Pandey MD;  Location: [facility];  Service: Plastics;  Laterality: Right;    BREAST TISSUE EXPANDER REMOVAL INSERTION OF IMPLANT Right 5/10/2023    Procedure: BREAST TISSUE EXPANDER  INSERTION WITH PLACEMENT OF MESH;  Surgeon: Jordan Pandey MD;  Location: [facility];  Service: Plastics;  Laterality: Right;    BUNIONECTOMY Left     COLONOSCOPY      aprrox 2018     COLONOSCOPY N/A 08/31/2021    Procedure: COLONOSCOPY WITH INJECTION ORISE/POLYPECTOMY HOT SNARE/ENDO CLIP X3;  Surgeon: David Hernandez MD;  Location: [facility] ENDOSCOPY;  Service: Gastroenterology;  Laterality: N/A;  COLON POLYP    COLONOSCOPY N/A 4/25/2025    Procedure: COLONOSCOPY;  Surgeon: David Hernandez MD;  Location: [facility] ENDOSCOPY;  Service: Gastroenterology;  Laterality: N/A;  NORMAL    CYSTOSCOPY      ENDOSCOPY      ENDOSCOPY N/A 08/31/2021    Procedure: ESOPHAGOGASTRODUODENOSCOPY WITH BIOPSY;  Surgeon: David Hernandez MD;  Location: [facility] ENDOSCOPY;  Service: Gastroenterology;  Laterality: N/A;  GASTRIC POLYPS/REFLUX ESOPHAGITIS    INCISION AND DRAINAGE TRUNK Right 5/27/2023    Procedure: INCISION AND DRAINAGE OF RIGHT BREAST;  Surgeon: Jordan Pandey MD;  Location: [facility] MAIN OR;  Service: Plastics;  Laterality: Right;    LAPAROSCOPIC CHOLECYSTECTOMY      MASTECTOMY Bilateral 12/07/2021    Procedure: MASTECTOMY;  Surgeon: Ayah Chaparro MD;  Location: [facility];  Service: General;  Laterality: Bilateral;    ROTATOR CUFF REPAIR Right         Current Outpatient Medications:     amLODIPine (NORVASC) 2.5 MG tablet, Take 1 tablet by mouth Every Night., Disp: , Rfl:     anastrozole (ARIMIDEX) 1 MG tablet, Take 1 tablet by mouth Daily., Disp: 90 tablet, Rfl: 1    atorvastatin (LIPITOR) 20 MG tablet, TAKE 1 TABLET BY MOUTH EVERY DAY AT NIGHT, Disp: 90 tablet, Rfl: 0    busPIRone (BUSPAR) 10 MG tablet, Take 1 tablet by mouth Every Night., Disp: , Rfl:     cetirizine (zyrTEC) 10 MG tablet, Take 1 tablet by mouth., Disp: , Rfl:      "dexlansoprazole (DEXILANT) 60 MG capsule, Take 1 capsule by mouth Daily., Disp: 90 capsule, Rfl: 1    escitalopram (LEXAPRO) 20 MG tablet, Take 1 tablet by mouth Every Night., Disp: 30 tablet, Rfl: 0    lisinopril-hydrochlorothiazide (PRINZIDE,ZESTORETIC) 20-12.5 MG per tablet, Take 1 tablet by mouth Every Night., Disp: 30 tablet, Rfl: 0    mirtazapine (REMERON) 15 MG tablet, Take 1 tablet by mouth every night at bedtime., Disp: 30 tablet, Rfl: 0    tamsulosin (FLOMAX) 0.4 MG capsule 24 hr capsule, Take 1 capsule by mouth Every Night., Disp: , Rfl:     vitamin D (ERGOCALCIFEROL) 1.25 MG (70521 UT) capsule capsule, Take 1 capsule by mouth 1 (One) Time Per Week., Disp: 12 capsule, Rfl: 2    Semaglutide-Weight Management (Wegovy) 2.4 MG/0.75ML solution auto-injector, Inject 0.75 mL under the skin into the appropriate area as directed 1 (One) Time Per Week for 12 doses., Disp: 3 mL, Rfl: 2    Current Facility-Administered Medications:     triamcinolone acetonide (KENALOG-40) injection 40 mg, 40 mg, Intramuscular, Once, Krissy Corbett APRN    OBJECTIVE  Vital Signs:   /73 (BP Location: Left arm, Patient Position: Sitting, Cuff Size: Large Adult)   Pulse 72   Ht 160 cm (63\")   Wt 79.2 kg (174 lb 9.6 oz)   SpO2 99%   BMI 30.93 kg/m²    Estimated body mass index is 30.93 kg/m² as calculated from the following:    Height as of this encounter: 160 cm (63\").    Weight as of this encounter: 79.2 kg (174 lb 9.6 oz).     Wt Readings from Last 3 Encounters:   07/03/25 79.2 kg (174 lb 9.6 oz)   06/03/25 80.9 kg (178 lb 6.4 oz)   05/14/25 83.3 kg (183 lb 9.6 oz)     BP Readings from Last 3 Encounters:   07/03/25 114/73   06/03/25 117/78   05/14/25 102/68       Physical Exam  Vitals reviewed.   Constitutional:       General: She is not in acute distress.     Appearance: She is not ill-appearing.   HENT:      Head: Normocephalic and atraumatic.   Eyes:      Conjunctiva/sclera: Conjunctivae normal.   Cardiovascular:      " Rate and Rhythm: Normal rate and regular rhythm.      Heart sounds: Normal heart sounds.   Pulmonary:      Effort: Pulmonary effort is normal.      Breath sounds: Normal breath sounds.   Musculoskeletal:      Cervical back: Normal range of motion.   Neurological:      Mental Status: She is alert and oriented to person, place, and time.   Psychiatric:         Mood and Affect: Mood normal.         Behavior: Behavior normal.         Thought Content: Thought content normal.         Judgment: Judgment normal.          Result Review    Common labs          8/15/2024    09:20 1/15/2025    15:59 5/14/2025    07:44   Common Labs   Glucose  93     Glucose 94         BUN 17     15     Creatinine 0.8     0.87     Sodium 142     140     Potassium 3.6     4.0     Chloride 102     101     Calcium 9.4     9.4     Albumin  4.6     Total Bilirubin  0.2     Alkaline Phosphatase  121     AST (SGOT)  26     ALT (SGPT)  23     WBC  5.12     Hemoglobin  13.1     Hematocrit  39.3     Platelets  288     Total Cholesterol  222  150    Triglycerides  124  110    HDL Cholesterol  53  45    LDL Cholesterol   147  85       Details          This result is from an external source.               No Images in the past 120 days found..      The above data has been reviewed by CECI Zhu 07/03/2025 10:11 EDT.          Patient Care Team:  Krissy Corbett APRN as PCP - General (Nurse Practitioner)  Mariela Watson MD PhD as Consulting Physician (Hematology and Oncology)  Ayah Chaparro MD as Consulting Physician (General Surgery)  Veronica Morin APRN as Nurse Practitioner (Nurse Practitioner)  Hoda Espinoza APRN as Nurse Practitioner (Plastic Surgery)            ASSESSMENT & PLAN    Diagnoses and all orders for this visit:    1. Class 1 obesity due to excess calories with serious comorbidity and body mass index (BMI) of 31.0 to 31.9 in adult (Primary)  Comments:  Switch over to Wegovy maintenance dose of 2.4 mg, 3-month  follow-up  Orders:  -     Semaglutide-Weight Management (Wegovy) 2.4 MG/0.75ML solution auto-injector; Inject 0.75 mL under the skin into the appropriate area as directed 1 (One) Time Per Week for 12 doses.  Dispense: 3 mL; Refill: 2    2. Mixed hyperlipidemia  Comments:  Stable on Lipitor 20 mg, continue    3. Hypertension, unspecified type  Comments:  Stable on amlodipine 2.5 mg, Zestoretic 20-12.5 mg, continue    4. Gastroesophageal reflux disease, unspecified whether esophagitis present  Comments:  Stable on Dexilant 60 mg, continue  Overview:  Added automatically from request for surgery [id number]      5. RLS (restless legs syndrome)  Comments:  Stable on mirtazapine 15 mg nightly, continue    6. Anxiety  Comments:  Stable on Lexapro 20 and BuSpar 10, continue    7. Depression, unspecified depression type  Comments:  Stable on Lexapro 20 mg, continue    8. Bladder atonia  Comments:  Stable on tamsulosin 0.4 mg, continue         Tobacco Use: Low Risk  (7/3/2025)    Patient History     Smoking Tobacco Use: Never     Smokeless Tobacco Use: Never     Passive Exposure: Never       Follow Up     No follow-ups on file.      Patient was given instructions and counseling regarding her condition or for health maintenance advice. Please see specific information pulled into the AVS if appropriate.   I have reviewed information obtained and documented by others and I have confirmed the accuracy of this documented note.    CECI Zhu

## 2025-07-12 DIAGNOSIS — E78.2 MIXED HYPERLIPIDEMIA: ICD-10-CM

## 2025-07-15 RX ORDER — ATORVASTATIN CALCIUM 20 MG/1
20 TABLET, FILM COATED ORAL
Qty: 90 TABLET | Refills: 0 | Status: SHIPPED | OUTPATIENT
Start: 2025-07-15

## 2025-07-24 ENCOUNTER — OFFICE VISIT (OUTPATIENT)
Dept: FAMILY MEDICINE CLINIC | Facility: CLINIC | Age: 65
End: 2025-07-24
Payer: COMMERCIAL

## 2025-07-24 VITALS
OXYGEN SATURATION: 100 % | WEIGHT: 177 LBS | SYSTOLIC BLOOD PRESSURE: 151 MMHG | DIASTOLIC BLOOD PRESSURE: 78 MMHG | BODY MASS INDEX: 31.36 KG/M2 | HEIGHT: 63 IN | HEART RATE: 62 BPM

## 2025-07-24 DIAGNOSIS — M54.32 LEFT SIDED SCIATICA: Primary | ICD-10-CM

## 2025-07-24 RX ORDER — KETOROLAC TROMETHAMINE 30 MG/ML
30 INJECTION, SOLUTION INTRAMUSCULAR; INTRAVENOUS ONCE
Status: COMPLETED | OUTPATIENT
Start: 2025-07-24 | End: 2025-07-24

## 2025-07-24 RX ORDER — TIZANIDINE HYDROCHLORIDE 4 MG/1
4 CAPSULE, GELATIN COATED ORAL 3 TIMES DAILY
Qty: 30 CAPSULE | Refills: 0 | Status: SHIPPED | OUTPATIENT
Start: 2025-07-24

## 2025-07-24 RX ORDER — KETOROLAC TROMETHAMINE 30 MG/ML
30 INJECTION, SOLUTION INTRAMUSCULAR; INTRAVENOUS ONCE
Status: DISCONTINUED | OUTPATIENT
Start: 2025-07-24 | End: 2025-07-24

## 2025-07-24 RX ADMIN — KETOROLAC TROMETHAMINE 30 MG: 30 INJECTION, SOLUTION INTRAMUSCULAR; INTRAVENOUS at 15:33

## 2025-07-24 NOTE — PROGRESS NOTES
Chief Complaint  Leg Pain    SUBJECTIVE  Margy Jmienez presents to [facility]    History of Present Illness  Patient is a 65-year-old female resents today with complaints of left-sided sciatic pain.  Pain starts left buttock goes all the way down to her left knee.  Patient is a dull ache with occasional sharp shooting pain.  Patient has tried over-the-counter pain medication at home with no relief.  Patient states about a week ago she was stepping down off of a ladder and missed the last step and fell back onto her right buttock.      Past Medical History:   Diagnosis Date    Bladder atonia     LAZY BLADDER    Cancer     LEFT BREAST. NO BP OR NEEDLE STICKS IN LEFT ARM.  DID NOT REQUIRE CHEMO OR RADIATION    Depression     GERD (gastroesophageal reflux disease)     High blood pressure     Hyperlipidemia     Neoplasm of left breast     S/P bilateral mastectomy 12/08/2021    NO BP OR NEEDLE STICKS LEFT ARM    Status post bilateral breast reconstruction with implant placement 12/08/2021      Family History   Problem Relation Age of Onset    Diabetes Mother     Diabetes Other     Colon cancer Neg Hx     Malig Hyperthermia Neg Hx       Past Surgical History:   Procedure Laterality Date    ABDOMINAL HYSTERECTOMY      BREAST AUGMENTATION Bilateral 12/07/2021    Procedure: BILATERAL BREAST RECONSTRUCTION WITH IMPLANTS, IMPLANT OF BIOLOGICAL MESH,  USE OF SPY, AND BILATERAL CHEST LIPOSUCTION;  Surgeon: Neena Pires MD;  [place] [facility] [place] [facility] [place]  Service: Plastics;  Laterality: Bilateral;    BREAST BIOPSY Left 10/26/2021    Procedure: LEFT BREAST LUMPECTOMY WITH SENTINEL NODE BIOPSY AND NEEDLE LOCALIZATION;  Surgeon: Ayah Chaparro MD;  Location: [facility] MAIN OR;  Service: General;  Laterality: Left;    BREAST RECONSTRUCTION Bilateral 04/27/2022    Procedure: BREAST RECONSTRUCTION REVISION  with bilateral skin resection, bilateral nipple reconstruction.;  Surgeon:  Neena Pires MD;  Location: [facility] [place] [facility] [place];  Service: Plastics;  Laterality: Bilateral;    BREAST RECONSTRUCTION Bilateral 12/06/2022    Procedure: BREAST RECONSTRUCTION REVISION;  Surgeon: Neena Pires MD;  Location: [facility] [place];  Service: Plastics;  Laterality: Bilateral;    BREAST TISSUE EXPANDER REMOVAL INSERTION OF IMPLANT Right 1/4/2023    Procedure: BREAST IMPLANT REMOVAL;  Surgeon: Jordan Pandey MD;  Location: [facility] [place];  Service: Plastics;  Laterality: Right;    BREAST TISSUE EXPANDER REMOVAL INSERTION OF IMPLANT Right 5/10/2023    Procedure: BREAST TISSUE EXPANDER  INSERTION WITH PLACEMENT OF MESH;  Surgeon: Jordan Pandey MD;  Location: [facility] [place];  Service: Plastics;  Laterality: Right;    BUNIONECTOMY Left     COLONOSCOPY      aprrox 2018     COLONOSCOPY N/A 08/31/2021    Procedure: COLONOSCOPY WITH INJECTION ORISE/POLYPECTOMY HOT SNARE/ENDO CLIP X3;  Surgeon: David Hernandez MD;  Location: [facility] ENDOSCOPY;  Service: Gastroenterology;  Laterality: N/A;  COLON POLYP    COLONOSCOPY N/A 4/25/2025    Procedure: COLONOSCOPY;  Surgeon: David Hernandez MD;  Location: [facility] ENDOSCOPY;  Service: Gastroenterology;  Laterality: N/A;  NORMAL    CYSTOSCOPY      ENDOSCOPY      ENDOSCOPY N/A 08/31/2021    Procedure: ESOPHAGOGASTRODUODENOSCOPY WITH BIOPSY;  Surgeon: David Hernandez MD;  Location: [facility] ENDOSCOPY;  Service: Gastroenterology;  Laterality: N/A;  GASTRIC POLYPS/REFLUX ESOPHAGITIS    INCISION AND DRAINAGE TRUNK Right 5/27/2023    Procedure: INCISION AND DRAINAGE OF RIGHT BREAST;  Surgeon: Jordan Pandey MD;  Location: [facility] MAIN OR;  Service: Plastics;  Laterality: Right;    LAPAROSCOPIC CHOLECYSTECTOMY      MASTECTOMY Bilateral 12/07/2021    Procedure: MASTECTOMY;  Surgeon: Ayah Chaparro MD;  Location: [facility] [place];  Service: General;  Laterality: Bilateral;    ROTATOR CUFF REPAIR Right         Current Outpatient  "Medications:     amLODIPine (NORVASC) 2.5 MG tablet, Take 1 tablet by mouth Every Night., Disp: , Rfl:     anastrozole (ARIMIDEX) 1 MG tablet, Take 1 tablet by mouth Daily., Disp: 90 tablet, Rfl: 1    atorvastatin (LIPITOR) 20 MG tablet, TAKE 1 TABLET BY MOUTH EVERY DAY AT NIGHT, Disp: 90 tablet, Rfl: 0    busPIRone (BUSPAR) 10 MG tablet, Take 1 tablet by mouth Every Night., Disp: , Rfl:     cetirizine (zyrTEC) 10 MG tablet, Take 1 tablet by mouth., Disp: , Rfl:     dexlansoprazole (DEXILANT) 60 MG capsule, Take 1 capsule by mouth Daily., Disp: 90 capsule, Rfl: 1    escitalopram (LEXAPRO) 20 MG tablet, Take 1 tablet by mouth Every Night., Disp: 30 tablet, Rfl: 0    lisinopril-hydrochlorothiazide (PRINZIDE,ZESTORETIC) 20-12.5 MG per tablet, Take 1 tablet by mouth Every Night., Disp: 30 tablet, Rfl: 0    mirtazapine (REMERON) 15 MG tablet, Take 1 tablet by mouth every night at bedtime., Disp: 30 tablet, Rfl: 0    Semaglutide-Weight Management (Wegovy) 2.4 MG/0.75ML solution auto-injector, Inject 0.75 mL under the skin into the appropriate area as directed 1 (One) Time Per Week for 12 doses., Disp: 3 mL, Rfl: 2    tamsulosin (FLOMAX) 0.4 MG capsule 24 hr capsule, Take 1 capsule by mouth Every Night., Disp: , Rfl:     vitamin D (ERGOCALCIFEROL) 1.25 MG (32845 UT) capsule capsule, Take 1 capsule by mouth 1 (One) Time Per Week., Disp: 12 capsule, Rfl: 2    TiZANidine (Zanaflex) 4 MG capsule, Take 1 capsule by mouth 3 (Three) Times a Day., Disp: 30 capsule, Rfl: 0    Current Facility-Administered Medications:     triamcinolone acetonide (KENALOG-40) injection 40 mg, 40 mg, Intramuscular, Once, Krissy Corbett, APRN    OBJECTIVE  Vital Signs:   /78 (BP Location: Left arm, Patient Position: Sitting, Cuff Size: Large Adult)   Pulse 62   Ht 160 cm (62.99\")   Wt 80.3 kg (177 lb)   SpO2 100%   BMI 31.36 kg/m²    Estimated body mass index is 31.36 kg/m² as calculated from the following:    Height as of this encounter: " "160 cm (62.99\").    Weight as of this encounter: 80.3 kg (177 lb).     Wt Readings from Last 3 Encounters:   07/24/25 80.3 kg (177 lb)   07/03/25 79.2 kg (174 lb 9.6 oz)   06/03/25 80.9 kg (178 lb 6.4 oz)     BP Readings from Last 3 Encounters:   07/24/25 151/78   07/03/25 114/73   06/03/25 117/78       Physical Exam  Vitals reviewed.   Constitutional:       General: She is not in acute distress.     Appearance: She is not ill-appearing.   HENT:      Head: Normocephalic and atraumatic.   Eyes:      Conjunctiva/sclera: Conjunctivae normal.   Cardiovascular:      Rate and Rhythm: Normal rate and regular rhythm.      Heart sounds: Normal heart sounds.   Pulmonary:      Effort: Pulmonary effort is normal.      Breath sounds: Normal breath sounds.   Musculoskeletal:        Legs:    Skin:     General: Skin is warm and dry.   Neurological:      Mental Status: She is alert.          Result Review    Common labs          8/15/2024    09:20 1/15/2025    15:59 5/14/2025    07:44   Common Labs   Glucose  93     Glucose 94         BUN 17     15     Creatinine 0.8     0.87     Sodium 142     140     Potassium 3.6     4.0     Chloride 102     101     Calcium 9.4     9.4     Albumin  4.6     Total Bilirubin  0.2     Alkaline Phosphatase  121     AST (SGOT)  26     ALT (SGPT)  23     WBC  5.12     Hemoglobin  13.1     Hematocrit  39.3     Platelets  288     Total Cholesterol  222  150    Triglycerides  124  110    HDL Cholesterol  53  45    LDL Cholesterol   147  85       Details          This result is from an external source.               No Images in the past 120 days found..      The above data has been reviewed by CECI Zhu 07/24/2025 14:56 EDT.          Patient Care Team:  Krissy Corbett APRN as PCP - General (Nurse Practitioner)  Mariela Watson MD PhD as Consulting Physician (Hematology and Oncology)  Ayah Chaparro MD as Consulting Physician (General Surgery)  Veronica Morin APRN as Nurse " Practitioner (Nurse Practitioner)  Hoda Espinoza APRN as Nurse Practitioner (Plastic Surgery)            ASSESSMENT & PLAN    Diagnoses and all orders for this visit:    1. Left sided sciatica (Primary)  -     Discontinue: ketorolac (TORADOL) injection 30 mg  -     TiZANidine (Zanaflex) 4 MG capsule; Take 1 capsule by mouth 3 (Three) Times a Day.  Dispense: 30 capsule; Refill: 0  -     ketorolac (TORADOL) injection 30 mg    Toradol injection given in office.  Suggest over-the-counter anti-inflammatory such as ibuprofen or naproxen.  Sending in Zanaflex to take as muscle relaxer, warned of drowsiness.  Encouraged ice and moist heat alternation, gentle stretching and massage.  If no better follow-up    Tobacco Use: Low Risk  (7/24/2025)    Patient History     Smoking Tobacco Use: Never     Smokeless Tobacco Use: Never     Passive Exposure: Never       Follow Up     Return if symptoms worsen or fail to improve.      Patient was given instructions and counseling regarding her condition or for health maintenance advice. Please see specific information pulled into the AVS if appropriate.   I have reviewed information obtained and documented by others and I have confirmed the accuracy of this documented note.    CECI Zhu

## 2025-07-28 ENCOUNTER — TELEPHONE (OUTPATIENT)
Dept: ONCOLOGY | Facility: HOSPITAL | Age: 65
End: 2025-07-28
Payer: COMMERCIAL

## 2025-07-28 ENCOUNTER — TELEPHONE (OUTPATIENT)
Dept: FAMILY MEDICINE CLINIC | Facility: CLINIC | Age: 65
End: 2025-07-28
Payer: COMMERCIAL

## 2025-07-28 DIAGNOSIS — E66.811 CLASS 1 OBESITY DUE TO EXCESS CALORIES WITH SERIOUS COMORBIDITY AND BODY MASS INDEX (BMI) OF 31.0 TO 31.9 IN ADULT: Primary | ICD-10-CM

## 2025-07-28 DIAGNOSIS — E66.09 CLASS 1 OBESITY DUE TO EXCESS CALORIES WITH SERIOUS COMORBIDITY AND BODY MASS INDEX (BMI) OF 31.0 TO 31.9 IN ADULT: Primary | ICD-10-CM

## 2025-07-28 NOTE — TELEPHONE ENCOUNTER
[facility] REBECA called office requesting to speak with MA regarding getting a refill sent for Tirzepatide-Weight Management (ZEPBOUND) 5 MG/0.5ML solution auto-injector in order to be denied, so patient's new medication Semaglutide-Weight Management (Wegovy) 2.4 MG/0.75ML solution auto-injector can be approved.     [facility]/pharmacy #[id number] - [place], [place] - [address] - [phone]  - [phone] (Pharmacy)

## 2025-07-28 NOTE — TELEPHONE ENCOUNTER
I called and spoke to patient regarding labs that need to be completed for their appointment with  on 7/29/25 Patient agreed to complete their lab work.

## 2025-07-29 ENCOUNTER — LAB (OUTPATIENT)
Dept: LAB | Facility: HOSPITAL | Age: 65
End: 2025-07-29
Payer: COMMERCIAL

## 2025-07-29 ENCOUNTER — PATIENT MESSAGE (OUTPATIENT)
Dept: FAMILY MEDICINE CLINIC | Facility: CLINIC | Age: 65
End: 2025-07-29
Payer: COMMERCIAL

## 2025-07-29 ENCOUNTER — OFFICE VISIT (OUTPATIENT)
Dept: ONCOLOGY | Facility: HOSPITAL | Age: 65
End: 2025-07-29
Payer: COMMERCIAL

## 2025-07-29 VITALS
SYSTOLIC BLOOD PRESSURE: 130 MMHG | HEIGHT: 63 IN | DIASTOLIC BLOOD PRESSURE: 91 MMHG | TEMPERATURE: 98.1 F | BODY MASS INDEX: 31.5 KG/M2 | HEART RATE: 70 BPM | RESPIRATION RATE: 18 BRPM | WEIGHT: 177.8 LBS | OXYGEN SATURATION: 97 %

## 2025-07-29 DIAGNOSIS — Z79.811 LONG TERM (CURRENT) USE OF AROMATASE INHIBITORS: ICD-10-CM

## 2025-07-29 DIAGNOSIS — E55.9 VITAMIN D DEFICIENCY: ICD-10-CM

## 2025-07-29 DIAGNOSIS — Z17.0 MALIGNANT NEOPLASM OF UPPER-OUTER QUADRANT OF LEFT BREAST IN FEMALE, ESTROGEN RECEPTOR POSITIVE: ICD-10-CM

## 2025-07-29 DIAGNOSIS — C50.412 MALIGNANT NEOPLASM OF UPPER-OUTER QUADRANT OF LEFT BREAST IN FEMALE, ESTROGEN RECEPTOR POSITIVE: ICD-10-CM

## 2025-07-29 DIAGNOSIS — Z17.0 MALIGNANT NEOPLASM OF UPPER-OUTER QUADRANT OF LEFT BREAST IN FEMALE, ESTROGEN RECEPTOR POSITIVE: Primary | ICD-10-CM

## 2025-07-29 DIAGNOSIS — C50.412 MALIGNANT NEOPLASM OF UPPER-OUTER QUADRANT OF LEFT BREAST IN FEMALE, ESTROGEN RECEPTOR POSITIVE: Primary | ICD-10-CM

## 2025-07-29 LAB
25(OH)D3 SERPL-MCNC: 70.3 NG/ML (ref 30–100)
ALBUMIN SERPL-MCNC: 4.5 G/DL (ref 3.5–5.2)
ALBUMIN/GLOB SERPL: 1.5 G/DL
ALP SERPL-CCNC: 88 U/L (ref 39–117)
ALT SERPL W P-5'-P-CCNC: 18 U/L (ref 1–33)
ANION GAP SERPL CALCULATED.3IONS-SCNC: 10.4 MMOL/L (ref 5–15)
AST SERPL-CCNC: 20 U/L (ref 1–32)
BASOPHILS # BLD AUTO: 0.02 10*3/MM3 (ref 0–0.2)
BASOPHILS NFR BLD AUTO: 0.4 % (ref 0–1.5)
BILIRUB SERPL-MCNC: 0.4 MG/DL (ref 0–1.2)
BUN SERPL-MCNC: 11.5 MG/DL (ref 8–23)
BUN/CREAT SERPL: 13.2 (ref 7–25)
CALCIUM SPEC-SCNC: 9.6 MG/DL (ref 8.6–10.5)
CHLORIDE SERPL-SCNC: 96 MMOL/L (ref 98–107)
CO2 SERPL-SCNC: 28.6 MMOL/L (ref 22–29)
CREAT SERPL-MCNC: 0.87 MG/DL (ref 0.57–1)
DEPRECATED RDW RBC AUTO: 46.7 FL (ref 37–54)
EGFRCR SERPLBLD CKD-EPI 2021: 73.6 ML/MIN/1.73
EOSINOPHIL # BLD AUTO: 0.1 10*3/MM3 (ref 0–0.4)
EOSINOPHIL NFR BLD AUTO: 1.9 % (ref 0.3–6.2)
ERYTHROCYTE [DISTWIDTH] IN BLOOD BY AUTOMATED COUNT: 13.9 % (ref 12.3–15.4)
GLOBULIN UR ELPH-MCNC: 3.1 GM/DL
GLUCOSE SERPL-MCNC: 88 MG/DL (ref 65–99)
HCT VFR BLD AUTO: 38.7 % (ref 34–46.6)
HGB BLD-MCNC: 12.8 G/DL (ref 12–15.9)
IMM GRANULOCYTES # BLD AUTO: 0.01 10*3/MM3 (ref 0–0.05)
IMM GRANULOCYTES NFR BLD AUTO: 0.2 % (ref 0–0.5)
LYMPHOCYTES # BLD AUTO: 1.61 10*3/MM3 (ref 0.7–3.1)
LYMPHOCYTES NFR BLD AUTO: 31 % (ref 19.6–45.3)
MCH RBC QN AUTO: 30 PG (ref 26.6–33)
MCHC RBC AUTO-ENTMCNC: 33.1 G/DL (ref 31.5–35.7)
MCV RBC AUTO: 90.8 FL (ref 79–97)
MONOCYTES # BLD AUTO: 0.35 10*3/MM3 (ref 0.1–0.9)
MONOCYTES NFR BLD AUTO: 6.7 % (ref 5–12)
NEUTROPHILS NFR BLD AUTO: 3.1 10*3/MM3 (ref 1.7–7)
NEUTROPHILS NFR BLD AUTO: 59.8 % (ref 42.7–76)
NRBC BLD AUTO-RTO: 0 /100 WBC (ref 0–0.2)
PLATELET # BLD AUTO: 326 10*3/MM3 (ref 140–450)
PMV BLD AUTO: 9.9 FL (ref 6–12)
POTASSIUM SERPL-SCNC: 3.6 MMOL/L (ref 3.5–5.2)
PROT SERPL-MCNC: 7.6 G/DL (ref 6–8.5)
RBC # BLD AUTO: 4.26 10*6/MM3 (ref 3.77–5.28)
SODIUM SERPL-SCNC: 135 MMOL/L (ref 136–145)
WBC NRBC COR # BLD AUTO: 5.19 10*3/MM3 (ref 3.4–10.8)

## 2025-07-29 PROCEDURE — 82306 VITAMIN D 25 HYDROXY: CPT

## 2025-07-29 PROCEDURE — G0463 HOSPITAL OUTPT CLINIC VISIT: HCPCS | Performed by: INTERNAL MEDICINE

## 2025-07-29 PROCEDURE — 80053 COMPREHEN METABOLIC PANEL: CPT

## 2025-07-29 PROCEDURE — 99214 OFFICE O/P EST MOD 30 MIN: CPT | Performed by: INTERNAL MEDICINE

## 2025-07-29 PROCEDURE — 36415 COLL VENOUS BLD VENIPUNCTURE: CPT

## 2025-07-29 PROCEDURE — 85025 COMPLETE CBC W/AUTO DIFF WBC: CPT

## 2025-07-29 RX ORDER — ANASTROZOLE 1 MG/1
1 TABLET ORAL DAILY
Qty: 90 TABLET | Refills: 2 | Status: SHIPPED | OUTPATIENT
Start: 2025-07-29

## 2025-07-29 RX ORDER — HYDROXYZINE HYDROCHLORIDE 25 MG/1
TABLET, FILM COATED ORAL
COMMUNITY
Start: 2025-07-29

## 2025-07-29 NOTE — PROGRESS NOTES
Chief Complaint/Care Team   Pt here to follow up regarding breast cancer    Mariangel AnneCECI, CECI Rey    History of Present Illness     Diagnosis: ER+ Left Breast Cancer    Current Treatment: Anastrozole 1 mg PO QD began 1/2022    Hem/Onc History/Previous Treatment:     ER+ Left Breast Cancer:    Diagnostic mammogram/US 9/22/21: Heterogeneous ill-defined hypoechoic focus at the 12 o'clock position of the subareolar region of the left breast    US guided biopsy: Invasive ductal carcinoma with tubular features, grade 1, largest focus 7 mm, ER positive (100%, strong), OR positive (90%, strong), HER-2 negative (score 1+), Ki-67 5%, associated with ADH    Left Lumpectomy on 10/26/21: Pathology positive for a tubular carcinoma, inferiorly, associated with DCIS, grade 1, 9mm in size, strongly ER/OR+, pT1bN0. A second foci was found incidentally and measured 4mm in size at the edge of the margin.    Bilateral Mastectomy on 12/7/21: No malignancy was detected.      Pt transferred care to Dr. Shen on 7/9/2024.    Interval History:  Margy Jimenez is a 65 y.o. female who presents to [facility] for follow up regarding ER+ Left Breast Cancer diagnosed 9/2021.  Patient status post therapies as above including a bilateral mastectomy in December 2021, with reconstruction, also patient reports undergoing a REMY flap in 2023, with another breast procedure scheduled in August 2024 with Dr. Almonte.  Patient was not currently taking calcium vitamin D supplementation secondary to her concern regarding calcium vitamin D contributing to stone development, she has history of gallstones but is status post cholecystectomy.  No report of breast lesions, significant hot flashes, patient reports tolerating anastrozole well. Pt found to have severely low Vit D so PCP prescribed Vit D repletion which pt began on 1/19/2025. Labs pending from 7/29/2025.     History of Present  "Illness         Review of Systems     Oncology/Hematology History Overview Note     ER+ Left Breast Cancer:    Diagnostic mammogram/US 9/22/21: Heterogeneous ill-defined hypoechoic focus at the 12 o'clock position of the subareolar region of the left breast    US guided biopsy: Invasive ductal carcinoma with tubular features, grade 1, largest focus 7 mm, ER positive (100%, strong), GA positive (90%, strong), HER-2 negative (score 1+), Ki-67 5%, associated with ADH    Left Lumpectomy on 10/26/21: Pathology positive for a tubular carcinoma, inferiorly, associated with DCIS, grade 1, 9mm in size, strongly ER/GA+, pT1bN0. A second foci was found incidentally and measured 4mm in size at the edge of the margin.    Bilateral Mastectomy on 12/7/21: No malignancy was detected.       Malignant neoplasm of left breast in female, estrogen receptor positive   10/5/2021 Initial Diagnosis    Malignant neoplasm of left breast in female, estrogen receptor positive (HCC)         Objective     Vitals:    07/29/25 1445   BP: 130/91   Pulse: 70   Resp: 18   Temp: 98.1 °F (36.7 °C)   TempSrc: Oral   SpO2: 97%   Weight: 80.6 kg (177 lb 12.8 oz)   Height: 160 cm (63\")   PainSc: 0-No pain       ECOG score: 0         PHQ-9 Total Score:         Physical Exam  Vitals reviewed. Exam conducted with a chaperone present.   Constitutional:       General: She is not in acute distress.     Appearance: Normal appearance.   HENT:      Head: Normocephalic and atraumatic.   Eyes:      Extraocular Movements: Extraocular movements intact.      Conjunctiva/sclera: Conjunctivae normal.   Pulmonary:      Effort: Pulmonary effort is normal.   Musculoskeletal:      Cervical back: Normal range of motion and neck supple.   Skin:     General: Skin is warm and dry.      Findings: No bruising.   Neurological:      Mental Status: She is oriented to person, place, and time.         Physical Exam        Past Medical History     Past Medical History:   Diagnosis Date    " Bladder atonia     LAZY BLADDER    Cancer     LEFT BREAST. NO BP OR NEEDLE STICKS IN LEFT ARM.  DID NOT REQUIRE CHEMO OR RADIATION    Depression     GERD (gastroesophageal reflux disease)     High blood pressure     Hyperlipidemia     Neoplasm of left breast     S/P bilateral mastectomy 12/08/2021    NO BP OR NEEDLE STICKS LEFT ARM    Status post bilateral breast reconstruction with implant placement 12/08/2021     Current Outpatient Medications on File Prior to Visit   Medication Sig Dispense Refill    amLODIPine (NORVASC) 2.5 MG tablet Take 1 tablet by mouth Every Night.      atorvastatin (LIPITOR) 20 MG tablet TAKE 1 TABLET BY MOUTH EVERY DAY AT NIGHT 90 tablet 0    busPIRone (BUSPAR) 10 MG tablet Take 1 tablet by mouth Every Night.      cetirizine (zyrTEC) 10 MG tablet Take 1 tablet by mouth.      dexlansoprazole (DEXILANT) 60 MG capsule Take 1 capsule by mouth Daily. 90 capsule 1    escitalopram (LEXAPRO) 20 MG tablet Take 1 tablet by mouth Every Night. 30 tablet 0    hydrOXYzine (ATARAX) 25 MG tablet       lisinopril-hydrochlorothiazide (PRINZIDE,ZESTORETIC) 20-12.5 MG per tablet Take 1 tablet by mouth Every Night. 30 tablet 0    mirtazapine (REMERON) 15 MG tablet Take 1 tablet by mouth every night at bedtime. 30 tablet 0    tamsulosin (FLOMAX) 0.4 MG capsule 24 hr capsule Take 1 capsule by mouth Every Night.      Tirzepatide-Weight Management (ZEPBOUND) 5 MG/0.5ML solution auto-injector Inject 0.5 mL under the skin into the appropriate area as directed 1 (One) Time Per Week for 4 doses. 2 mL 0    TiZANidine (Zanaflex) 4 MG capsule Take 1 capsule by mouth 3 (Three) Times a Day. 30 capsule 0    vitamin D (ERGOCALCIFEROL) 1.25 MG (51163 UT) capsule capsule Take 1 capsule by mouth 1 (One) Time Per Week. 12 capsule 2    [DISCONTINUED] anastrozole (ARIMIDEX) 1 MG tablet Take 1 tablet by mouth Daily. 90 tablet 1    [DISCONTINUED] Semaglutide-Weight Management (Wegovy) 2.4 MG/0.75ML solution auto-injector Inject 0.75  mL under the skin into the appropriate area as directed 1 (One) Time Per Week for 12 doses. 3 mL 2     Current Facility-Administered Medications on File Prior to Visit   Medication Dose Route Frequency Provider Last Rate Last Admin    triamcinolone acetonide (KENALOG-40) injection 40 mg  40 mg Intramuscular Once Aric, Krissy, APRN          Allergies   Allergen Reactions    Hydromorphone Anaphylaxis and Palpitations    Hydromorphone Hcl Anaphylaxis and Palpitations    Cephalexin Hives    Cyanoacrylate Dermatitis    Iodine Dermatitis     Past Surgical History:   Procedure Laterality Date    ABDOMINAL HYSTERECTOMY      BREAST AUGMENTATION Bilateral 12/07/2021    Procedure: BILATERAL BREAST RECONSTRUCTION WITH IMPLANTS, IMPLANT OF BIOLOGICAL MESH,  USE OF SPY, AND BILATERAL CHEST LIPOSUCTION;  Surgeon: Neena Pires MD;  Location: [facility] [place];  Service: Plastics;  Laterality: Bilateral;    BREAST BIOPSY Left 10/26/2021    Procedure: LEFT BREAST LUMPECTOMY WITH SENTINEL NODE BIOPSY AND NEEDLE LOCALIZATION;  Surgeon: Ayah Chaparro MD;  Location: [facility] MAIN OR;  Service: General;  Laterality: Left;    BREAST RECONSTRUCTION Bilateral 04/27/2022    Procedure: BREAST RECONSTRUCTION REVISION  with bilateral skin resection, bilateral nipple reconstruction.;  Surgeon: Neena Pires MD;  Location: [facility] [place];  Service: Plastics;  Laterality: Bilateral;    BREAST RECONSTRUCTION Bilateral 12/06/2022    Procedure: BREAST RECONSTRUCTION REVISION;  Surgeon: Neena Pires MD;  Location: [facility];  Service: Plastics;  Laterality: Bilateral;    BREAST TISSUE EXPANDER REMOVAL INSERTION OF IMPLANT Right 1/4/2023    Procedure: BREAST IMPLANT REMOVAL;  Surgeon: Jordan Pandey MD;  Location: [facility] OR [facility] [place];  Service: Plastics;  Laterality: Right;    BREAST TISSUE EXPANDER REMOVAL INSERTION OF IMPLANT Right 5/10/2023    Procedure: BREAST TISSUE EXPANDER  INSERTION WITH PLACEMENT OF MESH;   Surgeon: Jordan Pandey MD;  Location: [facility];  Service: Plastics;  Laterality: Right;    BUNIONECTOMY Left     COLONOSCOPY      aprrox 2018     COLONOSCOPY N/A 08/31/2021    Procedure: COLONOSCOPY WITH INJECTION ORISE/POLYPECTOMY HOT SNARE/ENDO CLIP X3;  Surgeon: David Hernandez MD;  Location: [facility] ENDOSCOPY;  Service: Gastroenterology;  Laterality: N/A;  COLON POLYP    COLONOSCOPY N/A 4/25/2025    Procedure: COLONOSCOPY;  Surgeon: David Hernandez MD;  Location: [facility] ENDOSCOPY;  Service: Gastroenterology;  Laterality: N/A;  NORMAL    CYSTOSCOPY      ENDOSCOPY      ENDOSCOPY N/A 08/31/2021    Procedure: ESOPHAGOGASTRODUODENOSCOPY WITH BIOPSY;  Surgeon: David Hernandez MD;  Location: [facility] ENDOSCOPY;  Service: Gastroenterology;  Laterality: N/A;  GASTRIC POLYPS/REFLUX ESOPHAGITIS    INCISION AND DRAINAGE TRUNK Right 5/27/2023    Procedure: INCISION AND DRAINAGE OF RIGHT BREAST;  Surgeon: Jordan Pandey MD;  Location: [facility] MAIN OR;  Service: Plastics;  Laterality: Right;    LAPAROSCOPIC CHOLECYSTECTOMY      MASTECTOMY Bilateral 12/07/2021    Procedure: MASTECTOMY;  Surgeon: Ayha Chaparro MD;  Location: [facility] OR [facility];  Service: General;  Laterality: Bilateral;    ROTATOR CUFF REPAIR Right      Social History     Socioeconomic History    Marital status:    Tobacco Use    Smoking status: Never     Passive exposure: Never    Smokeless tobacco: Never   Vaping Use    Vaping status: Never Used   Substance and Sexual Activity    Alcohol use: Yes     Alcohol/week: 1.0 standard drink of alcohol     Types: 1 Glasses of wine per week     Comment: [facility]    Drug use: Never    Sexual activity: Defer         Results     Result Review   The following data was reviewed by: Hamida Shen MD   Lab Results   Component Value Date    HGB 13.1 01/15/2025    HCT 39.3 01/15/2025    MCV 87.9 01/15/2025     01/15/2025    WBC 5.12 01/15/2025    NEUTROABS 2.93 01/15/2025     LYMPHSABS 1.69 01/15/2025    MONOSABS 0.32 01/15/2025    EOSABS 0.14 01/15/2025    BASOSABS 0.02 01/15/2025     Lab Results   Component Value Date    GLUCOSE 93 01/15/2025    BUN 15 01/15/2025    CREATININE 0.87 01/15/2025     01/15/2025    K 4.0 01/15/2025     01/15/2025    CO2 26.6 01/15/2025    CALCIUM 9.4 01/15/2025    PROTEINTOT 8.1 01/15/2025    ALBUMIN 4.6 01/15/2025    BILITOT 0.2 01/15/2025    ALKPHOS 121 (H) 01/15/2025    AST 26 01/15/2025    ALT 23 01/15/2025     Lab Results   Component Value Date    FREET4 1.08 01/15/2025    TSH 2.310 01/15/2025       No radiology results for the last day       Assessment & Plan     Diagnoses and all orders for this visit:    1. Vitamin D deficiency (Primary)  -     Vitamin D,25-Hydroxy; Future    2. Malignant neoplasm of upper-outer quadrant of left breast in female, estrogen receptor positive  -     anastrozole (ARIMIDEX) 1 MG tablet; Take 1 tablet by mouth Daily.  Dispense: 90 tablet; Refill: 2  -     CBC & Differential; Future  -     Comprehensive Metabolic Panel; Future    3. Long term (current) use of aromatase inhibitors  -     DEXA Bone Density Axial; Future           Margy Jimenez is a 65 y.o. female who presents to [facility] HEMATOLOGY [facility] ONCOL[facility] for  follow up regarding ER+ Left Breast Cancer diagnosed 9/2021.    -pt now s/p bilateral mastectomy with reconstruction, and s/p REMY procedure in 2023   -pt does not require a mammogram due to mastectomies  -pt on endocrine therapy with Anastrozole which began in 1/2022  -last DEXA scan was 4/2024 was normal, due in 4/2026  -Labs are pending from 7/29/2025 we will call patient once results return  -pt with h/o Vit D deficiency labs pending from 7/29/2025 to assess for recurrence  - Recommend she continue anastrozole 1 mg p.o. once daily  -plan to check labs CBC, CMP, Vit D for next clinic appointment in 10 months, with DEXA scan results, patient knows she can call me sooner if  she has other problems related to arimidex in between clinic appointments.    Please note that portions of this note were completed with a voice recognition program.    Electronically signed by Hamida Shen MD, 07/29/25, 3:49 PM EDT.          Assessment & Plan      Follow Up     I spent 30 minutes caring for Margy on this date of service. This time includes time spent by me in the following activities:preparing for the visit, reviewing tests, obtaining and/or reviewing a separately obtained history, performing a medically appropriate examination and/or evaluation , counseling and educating the patient/family/caregiver, ordering medications, tests, or procedures, referring and communicating with other health care professionals , documenting information in the medical record, independently interpreting results and communicating that information with the patient/family/caregiver, and care coordination    The plan was discussed with the patient and/or family. The patient was given time to ask questions and these questions were answered. At the conclusion of their visit they had no additional questions or concerns and all questions were answered to their satisfaction.    Patient was given instructions and counseling regarding her condition or for health maintenance advice. Please see specific information pulled into the AVS if appropriate.

## 2025-07-30 NOTE — TELEPHONE ENCOUNTER
Patient called to check on the status of her Zepbound due to her pharmacy still not receiving the medication

## 2025-07-31 ENCOUNTER — TELEPHONE (OUTPATIENT)
Dept: FAMILY MEDICINE CLINIC | Facility: CLINIC | Age: 65
End: 2025-07-31
Payer: COMMERCIAL

## 2025-07-31 NOTE — TELEPHONE ENCOUNTER
"  Caller: Margy Jimenez \"BOB\"    Relationship: Self    Best call back number: [phone]     What is the best time to reach you: ANY TIME    Who are you requesting to speak with (clinical staff, provider,  specific staff member): JERRY ROCK OR MEDICAL ASSISTANT    What was the call regarding: PATIENT STATES THAT SHE WANTS TO SPEAK WITH CLINICAL STAFF OR JERRY ROCK DUE TO AN INSURANCE SITUATION WITH A MEDICATION.    Is it okay if the provider responds through MyChart: NO        "

## 2025-08-29 RX ORDER — DEXLANSOPRAZOLE 60 MG/1
1 CAPSULE, DELAYED RELEASE ORAL DAILY
Qty: 90 CAPSULE | Refills: 0 | Status: SHIPPED | OUTPATIENT
Start: 2025-08-29

## (undated) DEVICE — GOWN,REINFORCE,POLY,SIRUS,BREATH SLV,XLG: Brand: MEDLINE

## (undated) DEVICE — MEDI-VAC NON-CONDUCTIVE SUCTION TUBING: Brand: CARDINAL HEALTH

## (undated) DEVICE — THE STERILE LIGHT HANDLE COVER IS USED WITH STERIS SURGICAL LIGHTING AND VISUALIZATION SYSTEMS.

## (undated) DEVICE — GLV SURG BIOGEL LTX PF 7 1/2

## (undated) DEVICE — ANTIBACTERIAL UNDYED BRAIDED (POLYGLACTIN 910), SYNTHETIC ABSORBABLE SUTURE: Brand: COATED VICRYL

## (undated) DEVICE — GLV SURG SENSICARE SLT PF LF 5.5 STRL

## (undated) DEVICE — BLAKE SILICONE DRAIN, 15 FR ROUND, HUBLESS WITH 3/16" TROCAR: Brand: BLAKE

## (undated) DEVICE — 450 ML BOTTLE OF 0.05% CHLORHEXIDINE GLUCONATE IN 99.95% STERILE WATER FOR IRRIGATION, USP AND APPLICATOR.: Brand: IRRISEPT ANTIMICROBIAL WOUND LAVAGE

## (undated) DEVICE — INTENDED FOR TISSUE SEPARATION, AND OTHER PROCEDURES THAT REQUIRE A SHARP SURGICAL BLADE TO PUNCTURE OR CUT.: Brand: BARD-PARKER ® CARBON RIB-BACK BLADES

## (undated) DEVICE — DRSNG GZ CURAD XEROFORM NONADHS 5X9IN STRL

## (undated) DEVICE — GLV SURG SENSICARE PI PF LF 7 GRN STRL

## (undated) DEVICE — PLASTIC MAJOR-LF: Brand: MEDLINE INDUSTRIES, INC.

## (undated) DEVICE — SLV SCD LEG COMFORT KENDALLSCD MD REPROC

## (undated) DEVICE — SUT MNCRYL 2/0 CT1 36IN

## (undated) DEVICE — KT SYR GEL ORISE SNGL PK 10ML

## (undated) DEVICE — PROXIMATE RH ROTATING HEAD SKIN STAPLERS (35 WIDE) CONTAINS 35 STAINLESS STEEL STAPLES: Brand: PROXIMATE

## (undated) DEVICE — ELECTRD BLD EXT EDGE/INSUL 6IN

## (undated) DEVICE — SOL IRRG H2O PL/BG 1000ML STRL

## (undated) DEVICE — Device

## (undated) DEVICE — SPNG GZ WOVN 4X4IN 12PLY 10/BX STRL

## (undated) DEVICE — SLV SCD KN/LEN ADJ EXPRSS BLENDED MD 1P/U

## (undated) DEVICE — DEV OPN LIGASURE DISSCT EXACT 40DEG 21.6MM BX/1

## (undated) DEVICE — BIOPATCH™ ANTIMICROBIAL DRESSING WITH CHLORHEXIDINE GLUCONATE IS A HYDROPHILLIC POLYURETHANE ABSORPTIVE FOAM WITH CHLORHEXIDINE GLUCONATE (CHG) WHICH INHIBITS BACTERIAL GROWTH UNDER THE DRESSING. THE DRESSING IS INTENDED TO BE USED TO ABSORB EXUDATE, COVER A WOUND CAUSED BY VASCULAR AND NONVASCULAR PERCUTANEOUS MEDICAL DEVICES DURING SURGERY, AS WELL AS REDUCE LOCAL INFECTION AND COLONIZATION OF MICROORGANISMS.: Brand: BIOPATCH

## (undated) DEVICE — MARKR SKIN W/RULR AND LBL

## (undated) DEVICE — SUT PERMAHAND SILK 0 FSL 30IN BLK

## (undated) DEVICE — STERILE COTTON BALLS LARGE 5/P: Brand: MEDLINE

## (undated) DEVICE — PENCL E/S SMOKEEVAC W/TELESCP CANN

## (undated) DEVICE — PATIENT RETURN ELECTRODE, SINGLE-USE, CONTACT QUALITY MONITORING, ADULT, WITH 9FT CORD, FOR PATIENTS WEIGING OVER 33LBS. (15KG): Brand: MEGADYNE

## (undated) DEVICE — CVR HNDL LT SURG ACCSSRY BLU STRL

## (undated) DEVICE — SPNG LAP PREWSH SFTPK 18X18IN STRL PK/5

## (undated) DEVICE — ELECTRD BLD EDGE/INSUL1P 2.4X5.1MM STRL

## (undated) DEVICE — DRSNG PAD ABD 8X10IN STRL

## (undated) DEVICE — GLV SURG SENSICARE PI MIC PF SZ5.5 LF STRL

## (undated) DEVICE — ADHS LIQ MASTISOL 2/3ML

## (undated) DEVICE — NDL HYPO PRECISIONGLIDE REG 22G 1 1/2

## (undated) DEVICE — DRSNG SURESITE WNDW 2.38X2.75

## (undated) DEVICE — STERILE POLYISOPRENE POWDER-FREE SURGICAL GLOVES WITH EMOLLIENT COATING: Brand: PROTEXIS

## (undated) DEVICE — STRIP CLS WND SUTURESTRIP/PLS 0.5X4IN TP1103

## (undated) DEVICE — SUT VIC 3/0 SH 27IN J416H

## (undated) DEVICE — GLV SURG SENSICARE SLT PF LF 6.5 STRL

## (undated) DEVICE — ASPIRATION TUBING SET, DISPOSABLE: Brand: MICROAIRE®

## (undated) DEVICE — RED RUBBER URETHRAL CATHETER: Brand: DOVER

## (undated) DEVICE — SNAR E/S POLYP SNAREMASTER OVL/10MM 2.8X2300MM YEL

## (undated) DEVICE — SOLIDIFIER LIQLOC PLS 1500CC BT

## (undated) DEVICE — Device: Brand: DEFENDO AIR/WATER/SUCTION AND BIOPSY VALVE

## (undated) DEVICE — BRA COMPR SURG STL119 V/CLOSE/FRNT SZQ WHT

## (undated) DEVICE — EGD OR ERCP KIT: Brand: MEDLINE INDUSTRIES, INC.

## (undated) DEVICE — PACK,UNIVERSAL,NO GOWNS: Brand: MEDLINE

## (undated) DEVICE — THE SINGLE USE ETRAP – POLYP TRAP IS USED FOR SUCTION RETRIEVAL OF ENDOSCOPICALLY REMOVED POLYPS.: Brand: ETRAP

## (undated) DEVICE — Device: Brand: MICROAIRE®

## (undated) DEVICE — ADHS SKIN PREMIERPRO EXOFIN TOPICAL HI/VISC .5ML

## (undated) DEVICE — SINGLE-USE BIOPSY FORCEPS: Brand: RADIAL JAW 4

## (undated) DEVICE — DEFENDO AIR WATER SUCTION AND BIOPSY VALVE KIT FOR  OLYMPUS: Brand: DEFENDO AIR/WATER/SUCTION AND BIOPSY VALVE

## (undated) DEVICE — TP SXN YANKR BULB STRL

## (undated) DEVICE — NON-WOVEN ADHESIVE WOUND DRESSING: Brand: PRIMAPORE ADHESIVE WOUND DRSG 7.2*5CM

## (undated) DEVICE — Device: Brand: SINGLE USE INJECTOR NM600/610

## (undated) DEVICE — DRSNG SURG AQUACEL AG 9X15CM

## (undated) DEVICE — JACKSON-PRATT 100CC BULB RESERVOIR: Brand: CARDINAL HEALTH

## (undated) DEVICE — DRSNG GZ CURAD XEROFORM PETROLTM OVERWRP 1X8IN STRL

## (undated) DEVICE — MAJOR-LF: Brand: MEDLINE INDUSTRIES, INC.

## (undated) DEVICE — SUREFIT, DUAL DISPERSIVE ELECTRODE, CONTACT QUALITY MONITOR: Brand: SUREFIT

## (undated) DEVICE — PREP SOL POVIDONE/IODINE BT 4OZ

## (undated) DEVICE — TOWEL,OR,DSP,ST,BLUE,STD,4/PK,20PK/CS: Brand: MEDLINE

## (undated) DEVICE — ELECTRD NDL MEGADYNE EZCLEAN MEGAFINE 2IN

## (undated) DEVICE — SUT ETHLN 3-0 FS118IN 663H

## (undated) DEVICE — DRSNG GZ PETROLTM XEROFORM CURAD 1X8IN STRL

## (undated) DEVICE — COLON KIT: Brand: MEDLINE INDUSTRIES, INC.

## (undated) DEVICE — BLAKE SILICONE DRAIN, 19 FR ROUND, HUBLESS WITH 1/4" TROCAR: Brand: BLAKE

## (undated) DEVICE — KT DRP SPY/PHI W/ICG 25MG STRL

## (undated) DEVICE — SYS CLS SKIN PREMIERPRO EXOFINFUSION 22CM

## (undated) DEVICE — SUT PDS 2/0 CT1 27IN DYED Z339H

## (undated) DEVICE — COVER,LIGHT HANDLE,FLX,1/PK: Brand: MEDLINE INDUSTRIES, INC.